# Patient Record
Sex: MALE | Race: WHITE | NOT HISPANIC OR LATINO | Employment: OTHER | URBAN - METROPOLITAN AREA
[De-identification: names, ages, dates, MRNs, and addresses within clinical notes are randomized per-mention and may not be internally consistent; named-entity substitution may affect disease eponyms.]

---

## 2017-03-22 RX ORDER — MEMANTINE HYDROCHLORIDE 5 MG/1
5 TABLET ORAL 2 TIMES DAILY
Qty: 60 TABLET | Refills: 2 | Status: SHIPPED | OUTPATIENT
Start: 2017-03-22 | End: 2017-05-09 | Stop reason: SDUPTHER

## 2017-04-14 RX ORDER — LEVETIRACETAM 500 MG/1
TABLET ORAL
Qty: 90 TABLET | Refills: 0 | Status: SHIPPED | OUTPATIENT
Start: 2017-04-14 | End: 2017-05-09 | Stop reason: SDUPTHER

## 2017-04-26 PROBLEM — K21.9 HIATAL HERNIA WITH GERD: Status: ACTIVE | Noted: 2017-04-26

## 2017-04-26 PROBLEM — H40.9 GLAUCOMA: Status: ACTIVE | Noted: 2017-04-26

## 2017-04-26 PROBLEM — R00.1 BRADYCARDIA: Status: ACTIVE | Noted: 2017-04-26

## 2017-04-26 PROBLEM — N40.0 BPH (BENIGN PROSTATIC HYPERPLASIA): Status: ACTIVE | Noted: 2017-04-26

## 2017-04-26 PROBLEM — K44.9 HIATAL HERNIA WITH GERD: Status: ACTIVE | Noted: 2017-04-26

## 2017-04-26 PROBLEM — G47.33 OSA ON CPAP: Status: ACTIVE | Noted: 2017-04-26

## 2017-04-26 PROBLEM — Z85.828 HISTORY OF SKIN CANCER: Status: ACTIVE | Noted: 2017-04-26

## 2017-04-26 PROBLEM — Z99.89 OSA ON CPAP: Status: ACTIVE | Noted: 2017-04-26

## 2017-05-09 ENCOUNTER — OFFICE VISIT (OUTPATIENT)
Dept: NEUROLOGY | Facility: CLINIC | Age: 82
End: 2017-05-09

## 2017-05-09 VITALS
DIASTOLIC BLOOD PRESSURE: 62 MMHG | SYSTOLIC BLOOD PRESSURE: 140 MMHG | HEIGHT: 67 IN | BODY MASS INDEX: 17.27 KG/M2 | HEART RATE: 59 BPM | WEIGHT: 110 LBS | OXYGEN SATURATION: 98 %

## 2017-05-09 DIAGNOSIS — G30.1 LATE ONSET ALZHEIMER'S DISEASE WITHOUT BEHAVIORAL DISTURBANCE (HCC): Primary | ICD-10-CM

## 2017-05-09 DIAGNOSIS — R27.0 ATAXIA: ICD-10-CM

## 2017-05-09 DIAGNOSIS — F02.80 LATE ONSET ALZHEIMER'S DISEASE WITHOUT BEHAVIORAL DISTURBANCE (HCC): Primary | ICD-10-CM

## 2017-05-09 DIAGNOSIS — G40.909 SEIZURE DISORDER (HCC): ICD-10-CM

## 2017-05-09 DIAGNOSIS — R53.1 WEAKNESS: ICD-10-CM

## 2017-05-09 PROCEDURE — 99213 OFFICE O/P EST LOW 20 MIN: CPT | Performed by: PSYCHIATRY & NEUROLOGY

## 2017-05-09 RX ORDER — MEMANTINE HYDROCHLORIDE 5 MG/1
5 TABLET ORAL 2 TIMES DAILY
Qty: 180 TABLET | Refills: 3 | Status: SHIPPED | OUTPATIENT
Start: 2017-05-09 | End: 2018-06-01 | Stop reason: SDUPTHER

## 2017-05-09 RX ORDER — LEVETIRACETAM 500 MG/1
500 TABLET ORAL 3 TIMES DAILY
Qty: 270 TABLET | Refills: 3 | Status: SHIPPED | OUTPATIENT
Start: 2017-05-09 | End: 2018-06-01 | Stop reason: SDUPTHER

## 2017-05-09 RX ORDER — DORZOLAMIDE HCL 20 MG/ML
1 SOLUTION/ DROPS OPHTHALMIC 2 TIMES DAILY
Refills: 11 | COMMUNITY
Start: 2017-04-10 | End: 2017-08-01

## 2017-06-11 ENCOUNTER — HOSPITAL ENCOUNTER (OUTPATIENT)
Facility: HOSPITAL | Age: 82
Setting detail: OBSERVATION
LOS: 1 days | Discharge: HOME | End: 2017-06-12
Attending: INTERNAL MEDICINE | Admitting: INTERNAL MEDICINE

## 2017-06-11 DIAGNOSIS — Z78.9 IMPAIRED MOBILITY AND ADLS: Primary | ICD-10-CM

## 2017-06-11 DIAGNOSIS — Z74.09 IMPAIRED MOBILITY AND ADLS: Primary | ICD-10-CM

## 2017-06-11 PROBLEM — K21.9 GERD (GASTROESOPHAGEAL REFLUX DISEASE): Status: ACTIVE | Noted: 2017-06-11

## 2017-06-11 PROBLEM — K46.0 INCARCERATED HERNIA: Status: ACTIVE | Noted: 2017-06-11

## 2017-06-11 PROBLEM — K56.609 SMALL BOWEL OBSTRUCTION (HCC): Status: ACTIVE | Noted: 2017-06-11

## 2017-06-11 PROBLEM — E43 SEVERE MALNUTRITION (HCC): Status: ACTIVE | Noted: 2017-06-11

## 2017-06-11 PROBLEM — N18.9 CHRONIC KIDNEY DISEASE: Status: ACTIVE | Noted: 2017-06-11

## 2017-06-11 PROCEDURE — 99220 PR INITIAL OBSERVATION CARE/DAY 70 MINUTES: CPT | Performed by: HOSPITALIST

## 2017-06-11 RX ORDER — DORZOLAMIDE HCL 20 MG/ML
1 SOLUTION/ DROPS OPHTHALMIC 3 TIMES DAILY
Status: DISCONTINUED | OUTPATIENT
Start: 2017-06-11 | End: 2017-06-12 | Stop reason: HOSPADM

## 2017-06-11 RX ORDER — ASPIRIN 81 MG/1
81 TABLET, CHEWABLE ORAL DAILY
Status: DISCONTINUED | OUTPATIENT
Start: 2017-06-12 | End: 2017-06-12 | Stop reason: HOSPADM

## 2017-06-11 RX ORDER — ATORVASTATIN CALCIUM 10 MG/1
10 TABLET, FILM COATED ORAL DAILY
Status: DISCONTINUED | OUTPATIENT
Start: 2017-06-12 | End: 2017-06-12 | Stop reason: HOSPADM

## 2017-06-11 RX ORDER — MEMANTINE HYDROCHLORIDE 10 MG/1
5 TABLET ORAL 2 TIMES DAILY
Status: DISCONTINUED | OUTPATIENT
Start: 2017-06-11 | End: 2017-06-12 | Stop reason: HOSPADM

## 2017-06-11 RX ORDER — LISINOPRIL 20 MG/1
20 TABLET ORAL DAILY
Status: DISCONTINUED | OUTPATIENT
Start: 2017-06-12 | End: 2017-06-12 | Stop reason: HOSPADM

## 2017-06-11 RX ORDER — SODIUM CHLORIDE 0.9 % (FLUSH) 0.9 %
1-10 SYRINGE (ML) INJECTION AS NEEDED
Status: DISCONTINUED | OUTPATIENT
Start: 2017-06-11 | End: 2017-06-12 | Stop reason: HOSPADM

## 2017-06-11 RX ORDER — HYDRALAZINE HYDROCHLORIDE 20 MG/ML
10 INJECTION INTRAMUSCULAR; INTRAVENOUS EVERY 6 HOURS PRN
Status: DISCONTINUED | OUTPATIENT
Start: 2017-06-11 | End: 2017-06-12 | Stop reason: HOSPADM

## 2017-06-11 RX ORDER — SODIUM CHLORIDE 9 MG/ML
50 INJECTION, SOLUTION INTRAVENOUS CONTINUOUS
Status: DISCONTINUED | OUTPATIENT
Start: 2017-06-11 | End: 2017-06-12 | Stop reason: HOSPADM

## 2017-06-11 RX ORDER — ONDANSETRON 4 MG/1
4 TABLET, FILM COATED ORAL EVERY 6 HOURS PRN
Status: DISCONTINUED | OUTPATIENT
Start: 2017-06-11 | End: 2017-06-12 | Stop reason: HOSPADM

## 2017-06-11 RX ORDER — LEVETIRACETAM 500 MG/1
500 TABLET ORAL 3 TIMES DAILY
Status: DISCONTINUED | OUTPATIENT
Start: 2017-06-11 | End: 2017-06-12 | Stop reason: HOSPADM

## 2017-06-11 RX ORDER — ACETAMINOPHEN 325 MG/1
650 TABLET ORAL EVERY 4 HOURS PRN
Status: DISCONTINUED | OUTPATIENT
Start: 2017-06-11 | End: 2017-06-12 | Stop reason: HOSPADM

## 2017-06-11 RX ORDER — ONDANSETRON 2 MG/ML
4 INJECTION INTRAMUSCULAR; INTRAVENOUS EVERY 6 HOURS PRN
Status: DISCONTINUED | OUTPATIENT
Start: 2017-06-11 | End: 2017-06-12 | Stop reason: HOSPADM

## 2017-06-11 RX ADMIN — LEVETIRACETAM 500 MG: 500 TABLET, FILM COATED ORAL at 22:11

## 2017-06-11 RX ADMIN — DORZOLAMIDE HYDROCHLORIDE 1 DROP: 20 SOLUTION/ DROPS OPHTHALMIC at 22:12

## 2017-06-11 RX ADMIN — SODIUM CHLORIDE 50 ML/HR: 9 INJECTION, SOLUTION INTRAVENOUS at 21:14

## 2017-06-11 RX ADMIN — APIXABAN 2.5 MG: 2.5 TABLET, FILM COATED ORAL at 22:12

## 2017-06-11 RX ADMIN — MEMANTINE 5 MG: 10 TABLET ORAL at 22:12

## 2017-06-11 NOTE — H&P
[facility] Medicine Services  HISTORY AND PHYSICAL    Primary Care Physician: Blake Gomez MD    Subjective     Chief Complaint: Abdominal Pain    History of Present Illness:   This is an 89 year old male with a past medical history significant for Paroxysmal atrial fibrillation, hypertension, seizure disorder, obstructive sleep apnea, Alzheimer's Dementia and coronary artery disease who presents as a transfer from [facility] after being evaluated for lower abdominal pain. History of present illness limited secondary to baseline dementia. Wife at bedside offers input. She states patient has had the pain intermittently over the course of past week. No radiation. No modifying factors. There is however, associated nausea with non-bloody vomiting and diarrhea. No complaints of fever, dysuria, cough, congestion, or sick contacts. Per outlying facility, chemistry and hematology unremarkable and urinalysis negative. CT abdomen impressive for Left incarcerated inguinal hernia. Patient subsequently transferred to [facility] for further treatment and evaluation.    THIS IS AN 88 YO M WITH MULTIPLE MEDICAL PROBLEMS, INCLUDING ADVANCED DEMENTIA WITH BEHAVIORAL PROBLEMS, SEIZURE D/O-LAST SEIZED ABOUT 22 MONTHS AGO AFTER LAMINECTOMY THAT WAS COMPLICATED BY ACUTE HYPOXIC RESPIRATORY FAILURE DUE TO ASPIRATION, WHICH REQUIRED INTUBATION TWICE AND PT MISSED SEIZURE MEDS AT THAT TIME, CHRONIC GAIT INSTABILITY, CAD/CABG, PAF ON ELIQUIS, AND HTN/HLD, WHO WAS TRANSFERRED FROM [facility] DUE TO INCARCERATED L INGUINAL HERNIA, REPORTEDLY WITH BOWEL OBSTRUCTION, BUT WHEN PT GOT HERE, HE WAS HAVING BOWEL MOVEMENTS AND NO FEVER/CHILLS OR LEUKOCYTOSIS. HIS WIFE REPORTED THAT HE HAD BEEN HAVING INTERMITTENT PAIN OVER THE LAST 5 DAYS, BUT WORSE TODAY WITH NAUSEA AND VOMITING. SHE ALSO REPORTED THAT HE'S HAD INCARCERATED HERNIA IN THE EARLY 1990S AND HAD SURGERY IN [place], [place], BUT WIFE DOES NOT REMEMBER  WHICH SIDE AND PT UNABLE TO PROVIDE HISTORY EITHER DUE TO DEMENTIA.    Review of Systems   Constitutional: Negative for fever and unexpected weight change.   HENT: Negative for congestion and sore throat.    Eyes: Negative for photophobia and visual disturbance.   Respiratory: Negative for cough and shortness of breath.    Cardiovascular: Negative for chest pain and leg swelling.   Gastrointestinal: Positive for abdominal pain, diarrhea, nausea and vomiting. Negative for blood in stool and constipation.   Endocrine: Negative for polyphagia and polyuria.   Genitourinary: Negative for dysuria and flank pain.   Musculoskeletal: Negative for arthralgias and back pain.   Skin: Positive for wound. Negative for pallor and rash.   Allergic/Immunologic: Negative for immunocompromised state.   Neurological: Negative for dizziness, syncope and headaches.   Hematological: Negative for adenopathy.   Psychiatric/Behavioral: Negative for agitation and confusion.      Otherwise complete ROS performed and negative except as mentioned in the HPI.    Past Medical History:   Diagnosis Date   • Abnormal CT scan, lung     PULMONARY NODULE (<6CM)   • Atrial fibrillation    • BPH (benign prostatic hyperplasia) 4/26/2017   • BPH (benign prostatic hypertrophy)    • Bradycardia 4/26/2017   • Cardiac disorder    • Cataract     STATUS POST REMOVAL WITH LENS IMPLANT   • Coronary artery disease    • Esophageal stricture     WTIH NOTED DILATION   • GERD (gastroesophageal reflux disease)    • Glaucoma    • Glaucoma    • H/O inguinal hernia repair    • Hiatal hernia    • Hiatal hernia with GERD 4/26/2017   • Hypercholesteremia    • Hypertension    • Malignant neoplasm    • Obstructive sleep apnea on CPAP    • LEE on CPAP 4/26/2017   • Paroxysmal atrial fibrillation    • Peripheral vascular disease    • Right upper lobe pneumonia    • Seizure disorder    • Skin cancer     WITH REMOVAL   • TIA (transient ischemic attack) 02/2013    Carotid duplex 50%  right ICA.       Past Surgical History:   Procedure Laterality Date   • BACK SURGERY     • CATARACT EXTRACTION WITH INTRAOCULAR LENS IMPLANT     • CERVICAL LAMINECTOMY     • CORONARY ARTERY BYPASS GRAFT  04/15/2002    x4   • ESOPHAGEAL DILATATION     • HERNIA REPAIR     • LUMBAR LAMINECTOMY  02/2012   • SKIN CANCER EXCISION         Family History   Problem Relation Age of Onset   • Coronary artery disease Other    • Stroke Other        Social History     Social History   • Marital status:      Spouse name: N/A   • Number of children: N/A   • Years of education: N/A     Occupational History   • Not on file.     Social History Main Topics   • Smoking status: Former Smoker   • Smokeless tobacco: Not on file      Comment: QUIT MANY YEARS AGO   • Alcohol use No   • Drug use: No   • Sexual activity: Defer     Other Topics Concern   • Not on file     Social History Narrative       Medications:  Prescriptions Prior to Admission   Medication Sig Dispense Refill Last Dose   • acetaminophen-codeine (TYLENOL #3) 300-30 MG per tablet Take  by mouth.   Taking   • apixaban (ELIQUIS) 2.5 MG tablet tablet Take 1 tablet by mouth 2 (two) times a day. (Patient taking differently: Take 10 mg by mouth 2 (Two) Times a Day.) 60 tablet 11 Taking   • aspirin (ASPIRIN LOW DOSE) 81 MG tablet Take  by mouth.   Taking   • Brinzolamide-Brimonidine (SIMBRINZA) 1-0.2 % suspension Apply  to eye.   Not Taking   • dorzolamide (TRUSOPT) 2 % ophthalmic solution   11 Taking   • EPINEPHrine (EPIPEN 2-ANKITA) 0.3 MG/0.3ML solution auto-injector injection EpiPen 2-Ankita 0.3 MG/0.3ML Injection Solution Auto-injector; Patient Sig: EpiPen 2-Ankita 0.3 MG/0.3ML Injection Solution Auto-injector ; 2; 0; 29-Apr-2014; Active   Taking   • levETIRAcetam (KEPPRA) 500 MG tablet Take 1 tablet by mouth 3 (Three) Times a Day. 270 tablet 3    • lisinopril (PRINIVIL,ZESTRIL) 10 MG tablet Take 20 mg by mouth Daily.   Taking   • memantine (NAMENDA) 5 MG tablet Take 1 tablet by  "mouth 2 (Two) Times a Day. 180 tablet 3    • pravastatin (PRAVACHOL) 40 MG tablet Take 40 mg by mouth daily.   Taking   • thiamine (VITAMINE B-1) 100 MG tablet Take  by mouth.   Taking   • timolol (TIMOPTIC) 0.25 % ophthalmic solution Apply  to eye.   Taking   • travoprost, [place] free, (TRAVATAN Z) 0.004 % solution ophthalmic solution Apply  to eye.   Taking       Allergies:  Allergies   Allergen Reactions   • Bee Venom    • Lipitor [Atorvastatin] Myalgia         Objective     Physical Exam:  Vital Signs: /73 (BP Location: Left arm, Patient Position: Lying)  Pulse 63  Temp 98.3 °F (36.8 °C) (Oral)   Resp 16  Ht 65\" (165.1 cm)  Wt 101 lb 12.8 oz (46.2 kg)  SpO2 97%  BMI 16.94 kg/m2  Physical Exam  Temp:  [98.3 °F (36.8 °C)] 98.3 °F (36.8 °C)  Heart Rate:  [63] 63  Resp:  [16] 16  BP: (177)/(73) 177/73  Constitutional: no acute distress, awake, alert, Dry MM, cachectic  Eyes: PERRLA, sclerae anicteric, no conjunctival injection  Neck: supple, no thyromegaly, trachea midline  Respiratory: Clear to auscultation bilaterally, nonlabored respirations   Cardiovascular: RRR, no murmurs, rubs, or gallops, palpable pedal pulses bilaterally  Gastrointestinal:decreased bowel sounds, soft, nontender, nondistended  Musculoskeletal: No bilateral ankle edema, no clubbing or cyanosis to bilateral lower extremities  Psychiatric: oriented x 3, appropriate affect, cooperative  Neurologic: Strength symmetric in all extremities, Cranial Nerves grossly intact to confrontation       ABD: SOFT, NT, ND, + BS, NO GUARDING OR PERITONEAL SIGNS, NO HERNIAS NOTED BILAT (REDUCED BY DR. LOYOLA EARLIER). NO SCARS NOTED ON EXAM.  SKIN: ABRASION ON SCALP/RIGHT SHOULDER (FELL WHILE WALKING DOG)    Results Reviewed:        OUTSIDE RECORDS REVIEWED    I have personally reviewed and interpreted available lab data, radiology studies and ECG obtained at time of admission.     Assessment / Plan     Assessment/Problem List:   Hospital Problem List  "    * (Principal)Incarcerated hernia    Chronic atrial fibrillation    Overview Signed 4/26/2017  9:21 AM by Kirk reynaga Initial occurrence postoperative CABG.  b. Recurrence, 2012 in setting of pneumonia.   c. CHADS II score of 2.           Essential hypertension    Seizure disorder    Overview Signed 4/26/2017  9:22 AM by Kirk schaeffer. Recurrent symptoms from May 2014 to May 2015.             Chronic kidney disease    CAD (coronary artery disease)    Overview Signed 7/20/2016  4:04 PM by Chris Saunders MD     a. March 2002, abnormal GXT cardiac catheterization, Dr. Hancock, 90% RCA, 70% proximal circumflex, 80% first obtuse marginal, and a 95% LAD with a normal LVEF.  b. On 04/15/02, coronary artery bypass grafting by Dr. Baker x4 with saphenous vein graft to the OM/lateral circumflex, saphenous vein graft to the PDA, and LIMA to the LAD.  i. Noted postoperative atrial fibrillation.   c. February 2004, Cardiolite negative for ischemia, EF of 64%.  d. July 2007, stress test negative for ischemia, read by Dr. Powell.          Hyperlipemia    Late onset Alzheimer's disease without behavioral disturbance    BPH (benign prostatic hyperplasia)    LEE on CPAP    GERD (gastroesophageal reflux disease)    Severe malnutrition            Plan:  1. Incarcerated Inguinal Hernia:  - Demonstrated on CT per outlying facility. No infectious or necrotic process appreciated. Vitals stable.  - remote history of hernia repair, unknown location  - Inpatient consult to general surgery. Unlikely surgical intervention. Possible manual reduction  - AM labs  - NPO except medications for now until cleared by surgery  - gentle hydration with saline 50 cc/hr    2. Chronic A. Fib:  - Stable and rate controlled.  - Chronically anticoagulated on Eliquis. Continue for now.    3. Hypertension:  - stable and controlled. Continue home meds. Monitor. PRN antihypertensive    4.Seizure Disorder  - On Keppra. Continue  - initiate  seizure precautions  - ativan PRN    5. LEE :  - no longer uses CPAP  - will have PRN    6. Severe malnutrition. Failure to thrive.  - Recent mechanical fall at home.  - PT/OT  - Nutrition Eval    Patient stable for admission to monitored med surg. Labs and vitals routine per nursing.    SPOKE WITH DR. KIP LOYOLA, WHO NOTED A SMALL L INGUINAL HERNIA AND REDUCED IT. NO SIGNS OF ANY BOWEL OBSTRUCTION. PT IS HEMODYNAMICALLY STABLE. NO PAIN NOW. PT/WIFE DOES NOT WANT ANY SURGERY IF NOT NECESSARY. WILL RESUME HOME MEDS-INCLUDING ELEIQUIS, DIET, AND PROVIDE VERY GENTLE IVF; HOME TOMORROW.    DVT prophylaxis: SCDs/TCD-ELIQUIS RESUMED  Code Status: full code  Admission Status: Patient will be admitted to OBSERVATION status, however if further evaluation or treatment plans warrant, status may change.  Based upon current information, I predict patient's care encounter to be less than or equal to 2 midnights.       Ann Avila PA-C 06/11/17 8:47 PM

## 2017-06-11 NOTE — CONSULTS
Paul Lyons  [id number]  12/24/1927       Patient Care Team:  Blake Gomez MD as PCP - General   Consulting: Mini      General Surgery History and Physical / Consult Note      Chief complaint abd pain  Subjective     Patient is a 89 y.o. male presents with 5 day history of crampy abd pain.  Today nausea and vomiting x 2 and wife took to [facility].  CT revealed SBO secondary to incarcerated LIH.  Dr. Mann and I accepted pt in transfer.  Pt has had an inguinal hernia repair many years ago but doesn't remember what side.  He is on Eliquis for afib.  He has dementia and does not remember when his last BM was.    Review of Systems   Pertinent items are noted in HPI    History  Past Medical History:   Diagnosis Date   • Abnormal CT scan, lung     PULMONARY NODULE (<6CM)   • Atrial fibrillation    • BPH (benign prostatic hyperplasia) 4/26/2017   • BPH (benign prostatic hypertrophy)    • Bradycardia 4/26/2017   • Cardiac disorder    • Cataract     STATUS POST REMOVAL WITH LENS IMPLANT   • Coronary artery disease    • Esophageal stricture     WTIH NOTED DILATION   • GERD (gastroesophageal reflux disease)    • Glaucoma    • Glaucoma    • H/O inguinal hernia repair    • Hiatal hernia    • Hiatal hernia with GERD 4/26/2017   • Hypercholesteremia    • Hypertension    • Malignant neoplasm    • Obstructive sleep apnea on CPAP    • LEE on CPAP 4/26/2017   • Paroxysmal atrial fibrillation    • Peripheral vascular disease    • Right upper lobe pneumonia    • Seizure disorder    • Skin cancer     WITH REMOVAL   • TIA (transient ischemic attack) 02/2013    Carotid duplex 50% right ICA.     Past Surgical History:   Procedure Laterality Date   • BACK SURGERY     • CATARACT EXTRACTION WITH INTRAOCULAR LENS IMPLANT     • CERVICAL LAMINECTOMY     • CORONARY ARTERY BYPASS GRAFT  04/15/2002    x4   • ESOPHAGEAL DILATATION     • HERNIA REPAIR     • LUMBAR LAMINECTOMY  02/2012   • SKIN CANCER EXCISION       Family History   Problem  "Relation Age of Onset   • Coronary artery disease Other    • Stroke Other      Social History   Substance Use Topics   • Smoking status: Former Smoker   • Smokeless tobacco: None      Comment: QUIT MANY YEARS AGO   • Alcohol use No     Prescriptions Prior to Admission   Medication Sig Dispense Refill Last Dose   • acetaminophen-codeine (TYLENOL #3) 300-30 MG per tablet Take  by mouth.   Taking   • apixaban (ELIQUIS) 2.5 MG tablet tablet Take 1 tablet by mouth 2 (two) times a day. (Patient taking differently: Take 10 mg by mouth 2 (Two) Times a Day.) 60 tablet 11 Taking   • aspirin (ASPIRIN LOW DOSE) 81 MG tablet Take  by mouth.   Taking   • Brinzolamide-Brimonidine (SIMBRINZA) 1-0.2 % suspension Apply  to eye.   Not Taking   • dorzolamide (TRUSOPT) 2 % ophthalmic solution   11 Taking   • EPINEPHrine (EPIPEN 2-ANKITA) 0.3 MG/0.3ML solution auto-injector injection EpiPen 2-Ankita 0.3 MG/0.3ML Injection Solution Auto-injector; Patient Sig: EpiPen 2-Ankita 0.3 MG/0.3ML Injection Solution Auto-injector ; 2; 0; 29-Apr-2014; Active   Taking   • levETIRAcetam (KEPPRA) 500 MG tablet Take 1 tablet by mouth 3 (Three) Times a Day. 270 tablet 3    • lisinopril (PRINIVIL,ZESTRIL) 10 MG tablet Take 20 mg by mouth Daily.   Taking   • memantine (NAMENDA) 5 MG tablet Take 1 tablet by mouth 2 (Two) Times a Day. 180 tablet 3    • pravastatin (PRAVACHOL) 40 MG tablet Take 40 mg by mouth daily.   Taking   • thiamine (VITAMINE B-1) 100 MG tablet Take  by mouth.   Taking   • timolol (TIMOPTIC) 0.25 % ophthalmic solution Apply  to eye.   Taking   • travoprost, [place] free, (TRAVATAN Z) 0.004 % solution ophthalmic solution Apply  to eye.   Taking     Allergies:  Bee venom and Lipitor [atorvastatin]    Objective     Vital Signs  Blood pressure 177/73, pulse 63, temperature 98.3 °F (36.8 °C), temperature source Oral, resp. rate 16, height 65\" (165.1 cm), weight 101 lb 12.8 oz (46.2 kg), SpO2 97 %.      Physical Exam:      General Appearance:    Alert, " cooperative, in no acute distress   Head:    Normocephalic, without obvious abnormality, atraumatic   Eyes:            Lids and lashes normal, conjunctivae and sclerae normal, no   icterus, no pallor, corneas clear, PERRLA   Ears:    Ears appear intact with no abnormalities noted   Throat:   No oral lesions, no thrush, oral mucosa moist   Neck:   No adenopathy, supple, trachea midline, no thyromegaly, no   carotid bruit, no JVD   Back:     No kyphosis present, no scoliosis present, no skin lesions,      erythema or scars, no tenderness to percussion or                   palpation,   range of motion normal   Lungs:     Clear to auscultation,respirations regular, even and                  unlabored    Heart:    Regular rhythm and normal rate, normal S1 and S2, no            murmur, no gallop, no rub, no click   Chest Wall:    No abnormalities observed   Abdomen:     Normal bowel sounds, no masses, no organomegaly, soft        non-tender, non-distended, no guarding, no rebound                Tenderness. I cannot find a scar in either inguinal region.   Left inguinal bulge nontender and no skin changes, easily reduced with gentle pressure.   Rectal:     Deferred   Extremities:   Moves all extremities well, no edema, no cyanosis, no             redness   Pulses:   Pulses palpable and equal bilaterally   Skin:   No bleeding, bruising or rash   Lymph nodes:   No palpable adenopathy   Neurologic:   Cranial nerves 2 - 12 grossly intact, sensation intact, DTR       present and equal bilaterally       Results Review:   OSH labs: WBC 9.3, creatinine 1        IMAGING:  OSH CT scan reviewed: loop of small bowel within left inguinal hernia and pt of SBO, no signs of ischemia    Assessment/Plan    1. LIH that was incarcerated but not strangulated and is now completely reduced.  Pt does not want surgery.  If hernia remains reduced overnight he may have PO tomorrow and if tolerates he may be discharged.  2. CAD  3. Afib on Eliquis:  hold for now.    Active Problems:    CAD (coronary artery disease)    Chronic atrial fibrillation    Essential hypertension    Hyperlipemia    Seizure disorder    Late onset Alzheimer's disease without behavioral disturbance    BPH (benign prostatic hyperplasia)    LEE on CPAP    GERD (gastroesophageal reflux disease)        I discussed the patients findings and my recommendations with patient and family.     Sarah Fontaine MD  06/11/17  7:22 PM

## 2017-06-12 VITALS
HEART RATE: 53 BPM | SYSTOLIC BLOOD PRESSURE: 138 MMHG | HEIGHT: 65 IN | DIASTOLIC BLOOD PRESSURE: 63 MMHG | WEIGHT: 101.8 LBS | OXYGEN SATURATION: 97 % | RESPIRATION RATE: 18 BRPM | BODY MASS INDEX: 16.96 KG/M2 | TEMPERATURE: 98 F

## 2017-06-12 LAB
ANION GAP SERPL CALCULATED.3IONS-SCNC: 7 MMOL/L (ref 3–11)
BUN BLD-MCNC: 13 MG/DL (ref 9–23)
BUN/CREAT SERPL: 14.4 (ref 7–25)
CALCIUM SPEC-SCNC: 9.6 MG/DL (ref 8.7–10.4)
CHLORIDE SERPL-SCNC: 103 MMOL/L (ref 99–109)
CO2 SERPL-SCNC: 29 MMOL/L (ref 20–31)
CREAT BLD-MCNC: 0.9 MG/DL (ref 0.6–1.3)
DEPRECATED RDW RBC AUTO: 46.3 FL (ref 37–54)
ERYTHROCYTE [DISTWIDTH] IN BLOOD BY AUTOMATED COUNT: 15 % (ref 11.3–14.5)
GFR SERPL CREATININE-BSD FRML MDRD: 79 ML/MIN/1.73
GLUCOSE BLD-MCNC: 99 MG/DL (ref 70–100)
HCT VFR BLD AUTO: 41.9 % (ref 38.9–50.9)
HGB BLD-MCNC: 13.4 G/DL (ref 13.1–17.5)
MCH RBC QN AUTO: 27.2 PG (ref 27–31)
MCHC RBC AUTO-ENTMCNC: 32 G/DL (ref 32–36)
MCV RBC AUTO: 85 FL (ref 80–99)
PLATELET # BLD AUTO: 245 10*3/MM3 (ref 150–450)
PMV BLD AUTO: 9.7 FL (ref 6–12)
POTASSIUM BLD-SCNC: 3.8 MMOL/L (ref 3.5–5.5)
RBC # BLD AUTO: 4.93 10*6/MM3 (ref 4.2–5.76)
SODIUM BLD-SCNC: 139 MMOL/L (ref 132–146)
WBC NRBC COR # BLD: 6.61 10*3/MM3 (ref 3.5–10.8)

## 2017-06-12 PROCEDURE — 99217 PR OBSERVATION CARE DISCHARGE MANAGEMENT: CPT | Performed by: INTERNAL MEDICINE

## 2017-06-12 RX ADMIN — MEMANTINE 5 MG: 10 TABLET ORAL at 08:31

## 2017-06-12 RX ADMIN — ATORVASTATIN CALCIUM 10 MG: 10 TABLET, FILM COATED ORAL at 08:28

## 2017-06-12 RX ADMIN — ASPIRIN 81 MG 81 MG: 81 TABLET ORAL at 08:28

## 2017-06-12 RX ADMIN — APIXABAN 2.5 MG: 2.5 TABLET, FILM COATED ORAL at 08:28

## 2017-06-12 RX ADMIN — LEVETIRACETAM 500 MG: 500 TABLET, FILM COATED ORAL at 08:28

## 2017-06-12 RX ADMIN — DORZOLAMIDE HYDROCHLORIDE 1 DROP: 20 SOLUTION/ DROPS OPHTHALMIC at 08:29

## 2017-06-12 RX ADMIN — LISINOPRIL 20 MG: 20 TABLET ORAL at 08:28

## 2017-06-12 NOTE — PROGRESS NOTES
Discharge Planning Assessment  [facility]     Patient Name: Paul Lyons  MRN: [id number]  Today's Date: 6/12/2017    Admit Date: 6/11/2017          Discharge Needs Assessment       06/12/17 1106    Living Environment    Lives With spouse    Living Arrangements house    Home Accessibility bed and bath are not on the first floor;bed and bath on same level    Stair Railings at Home inside, present at both sides    Type of Financial/Environmental Concern none    Transportation Available car    Living Environment Comment CM spoke with pt and pt spouse in room with permission in regards to discharge planning. Pt resides in [facility] with spouse in a 2 story home. Spouse states they dont use the upstairs. Pt is independent of ADLs. prior to admission.     Living Environment    Provides Primary Care For no one, unable/limited ability to care for self    Quality Of Family Relationships supportive    Able to Return to Prior Living Arrangements yes    Living Arrangement Comments Pt spouse, that pt has dementia, but is independent of ADLs and goal is to return home.    Discharge Needs Assessment    Concerns To Be Addressed no discharge needs identified    Readmission Within The Last 30 Days no previous admission in last 30 days    Outpatient/Agency/Support Group Needs homecare agency (specify level of care);inpatient rehabilitation facility (specify)   Pt has used [facility] past. Pt went to Rehab in [facility] a couple of years ago.    Anticipated Changes Related to Illness other (see comments)   Pt/spouse denies discharge needs.     Equipment Currently Used at Home none    Equipment Needed After Discharge none    Discharge Disposition still a patient    Discharge Contact Information if Applicable Peggy Lyons(spouse): [phone]    Discharge Planning Comments Pt has Medicare and [place] [facility]  and denies recent changes in insurance.  Pt goal is to return home with spouse. Pt/spouse  denies discharge needs. Spouse with help him and provide transportation for him upon discharge. CM will cont to follow.            Discharge Plan       06/12/17 1118    Case Management/Social Work Plan    Plan discharge plan    Patient/Family In Agreement With Plan yes    Additional Comments Goal is home with spouse. Denies discharge needs. CM will cont to follow        Discharge Placement     No information found                Demographic Summary       06/12/17 1103    Primary Care Physician Information    Name Blake Gomez            Functional Status       06/12/17 1103    Functional Status Prior    Ambulation 0-->independent    Transferring 0-->independent    Toileting 0-->independent    Bathing 0-->independent    Dressing 0-->independent    Eating 0-->independent    Communication 2-->difficulty understanding (not related to language barrier)    Swallowing 0-->swallows foods/liquids without difficulty            Psychosocial     None            Abuse/Neglect     None            Legal     None            Substance Abuse     None            Patient Forms     None          Veronica Vo, RN

## 2017-06-12 NOTE — THERAPY EVALUATION
Acute Care - Occupational Therapy Initial Evaluation  [facility]     Patient Name: Paul Lyons  : 1927  MRN: [id number]  Today's Date: 2017  Onset of Illness/Injury or Date of Surgery Date: 17  Date of Referral to OT: 17  Referring Physician: JORDIN Mcdermott    Admit Date: 2017       ICD-10-CM ICD-9-CM   1. Impaired mobility and ADLs Z74.09 799.89     Patient Active Problem List   Diagnosis   • Lung nodule   • CAD (coronary artery disease)   • Chronic atrial fibrillation   • Essential hypertension   • Hyperlipemia   • Shoulder bursitis   • Seizure disorder   • Late onset Alzheimer's disease without behavioral disturbance   • History of skin cancer   • BPH (benign prostatic hyperplasia)   • Glaucoma   • Hiatal hernia with GERD   • LEE on CPAP   • Bradycardia   • Ataxia   • Weakness   • GERD (gastroesophageal reflux disease)   • Incarcerated hernia   • Chronic kidney disease   • Severe malnutrition     Past Medical History:   Diagnosis Date   • Abnormal CT scan, lung     PULMONARY NODULE (<6CM)   • Atrial fibrillation    • BPH (benign prostatic hyperplasia) 2017   • BPH (benign prostatic hypertrophy)    • Bradycardia 2017   • Cardiac disorder    • Cataract     STATUS POST REMOVAL WITH LENS IMPLANT   • Coronary artery disease    • Esophageal stricture     WTIH NOTED DILATION   • GERD (gastroesophageal reflux disease)    • Glaucoma    • Glaucoma    • H/O inguinal hernia repair    • Hiatal hernia    • Hiatal hernia with GERD 2017   • Hypercholesteremia    • Hypertension    • Malignant neoplasm    • Obstructive sleep apnea on CPAP    • LEE on CPAP 2017   • Paroxysmal atrial fibrillation    • Peripheral vascular disease    • Right upper lobe pneumonia    • Seizure disorder    • Skin cancer     WITH REMOVAL   • TIA (transient ischemic attack) 2013    Carotid duplex 50% right ICA.     Past Surgical History:   Procedure Laterality Date   • BACK SURGERY     • CATARACT  EXTRACTION WITH INTRAOCULAR LENS IMPLANT     • CERVICAL LAMINECTOMY     • CORONARY ARTERY BYPASS GRAFT  04/15/2002    x4   • ESOPHAGEAL DILATATION     • HERNIA REPAIR     • LUMBAR LAMINECTOMY  02/2012   • SKIN CANCER EXCISION            OT ASSESSMENT FLOWSHEET (last 72 hours)      OT Evaluation       06/12/17 1106 06/12/17 1103 06/12/17 1100 06/11/17 1900       Rehab Evaluation    Document Type   evaluation;discharge evaluation/summary  -ST      Subjective Information   no complaints;agree to therapy  -ST      Patient Effort, Rehab Treatment   good  -ST      Symptoms Noted During/After Treatment   none  -ST      General Information    Patient Profile Review   yes  -ST      Onset of Illness/Injury or Date of Surgery Date   06/11/17  -ST      Referring Physician   SOILA Mcdermott-PAT  -ST      General Observations   Pt semi-supine in bed, leaning back on elbows; exit alarm and IV intact; wife present  -ST      Pertinent History Of Current Problem   nause X5 days with cramping in abd; found in have hernia  -ST      Precautions/Limitations   fall precautions;other (see comments)   dementia, exit alarm  -ST      Prior Level of Function   independent:;all household mobility;community mobility;transfer;bed mobility;ADL's;home management;dependent:;driving   walks 1-2 miles/day; wife drives   -ST      Equipment Currently Used at Home none  -CS  grab bar  -ST none  -CE     Plans/Goals Discussed With   patient and family;agreed upon  -ST      Risks Reviewed   patient and family:;LOB;nausea/vomiting;dizziness;increased discomfort;change in vital signs  -ST      Benefits Reviewed   patient and family:;improve function;increase independence;increase strength;increase balance;decrease pain;increase knowledge  -ST      Barriers to Rehab   cognitive status  -ST      Living Environment    Lives With spouse  -CS  spouse  -ST spouse  -CE     Living Arrangements house  -CS  house  -ST house  -CE     Home Accessibility bed and bath are not on  the first floor;bed and bath on same level  -CS  house is not wheelchair accessible;tub/shower is not walk in  -ST bed and bath are not on the first floor  -CE     Stair Railings at Home inside, present at both sides  -CS  inside, present on left side  -ST inside, present on left side  -CE     Type of Financial/Environmental Concern none  -CS  none  -ST none  -CE     Transportation Available car  -CS   car;family or friend will provide  -CE     Living Environment Comment CM spoke with pt and pt spouse in room with permission in regards to discharge planning. Pt resides in [facility] with spouse in a 2 story home. Spouse states they dont use the upstairs. Pt is independent of ADLs. prior to admission.   -CS  wife available 24/7 as needed; pt with dementia and requires reminders and cuing for corrections on accurate information; pt walks 1-2 miles per day/gardens however wife states decline overall in balance and activity level over past yr  -ST      Clinical Impression    Date of Referral to OT   06/12/17  -ST      OT Diagnosis   impaired ADLs  -ST      Prognosis   good  -ST      Patient/Family Goals Statement   return home  -ST      Criteria for Skilled Therapeutic Interventions Met   yes  -ST      Rehab Potential   good, to achieve stated therapy goals  -ST      Therapy Frequency   daily   pending d/c on day of eval   -ST      Anticipated Equipment Needs At Discharge   tub bench  -ST      Anticipated Discharge Disposition   home with assist;home with outpatient services   OP PT  -ST      Functional Level Prior    Ambulation  0-->independent  -CS  0-->independent  -CE     Transferring  0-->independent  -CS  0-->independent  -CE     Toileting  0-->independent  -CS  0-->independent  -CE     Bathing  0-->independent  -CS  0-->independent  -CE     Dressing  0-->independent  -CS  0-->independent  -CE     Eating  0-->independent  -CS  0-->independent  -CE     Communication  2-->difficulty understanding (not related to  language barrier)  -CS  2-->difficulty understanding (not related to language barrier)  -CE     Swallowing  0-->swallows foods/liquids without difficulty  -CS  0-->swallows foods/liquids without difficulty  -CE     Pain Assessment    Pain Assessment   No/denies pain  -ST      Cognitive Assessment/Intervention    Current Cognitive/Communication Assessment   functional  -ST      Orientation Status   oriented to;person;place   baseline cog per wife d/t dementia  -ST      Follows Commands/Answers Questions   100% of the time  -ST      Personal Safety   mild impairment  -ST      Personal Safety Interventions   fall prevention program maintained;gait belt;nonskid shoes/slippers when out of bed  -ST      Short/Long Term Memory   --   impaired however baseline w/dementia per wife  -ST      ROM (Range of Motion)    General ROM Detail   shoulder flexion and abd impacted actively at baseline, R worse than L; L WFL passively; R with deficits passively (~120 flexion--- baseline) per wife  -ST      MMT (Manual Muscle Testing)    General MMT Assessment Detail   4-5 functionally   -ST      Bed Mobility, Assessment/Treatment    Bed Mobility, Assistive Device   head of bed elevated  -ST      Bed Mob, Supine to Sit, [place]   independent  -ST      Bed Mob, Sit to Supine, [place]   independent  -ST      Transfer Assessment/Treatment    Transfers, Sit-Stand [place]   supervision required  -ST      Transfers, Stand-Sit [place]   supervision required  -ST      Functional Mobility    Functional Mobility- Ind. Level   contact guard assist  -ST      Functional Mobility-Distance (Feet)   500  -ST      Functional Mobility- Comment   no instances LOB for dizziness despite gait disturbance; has significant lateral movement d/t ankle deformity   -ST      Lower Body Dressing Assessment/Training    LB Dressing Assess/Train, Clothing Type   donning:;shoes  -ST      LB Dressing Assess/Train, Position   sitting  -ST      LB  Dressing Assess/Train, [place]   independent  -ST      LB Dressing Assess/Train, Comment   increased time to complete   -ST      Motor Skills/Interventions    Additional Documentation   Fine Motor Coordination Training (Group)  -ST      Fine Motor Coordination Training    Opposition   Right:;Left:;intact  -ST      Sensory Assessment/Intervention    Sensory Impairment   numbness;tingling   R/L hands-onset prior to cervical sx   -ST      General Therapy Interventions    Planned Therapy Interventions   ADL retraining;IADL retraining;balance training;home exercise program;strengthening;stretching   if pt remains in IP  -ST      Positioning and Restraints    Pre-Treatment Position   in bed  -ST      Post Treatment Position   bed  -ST      In Bed   notified nsg;supine;call light within reach;encouraged to call for assist;exit alarm on;patient within staff view;with family/caregiver  -ST        User Key  (r) = Recorded By, (t) = Taken By, (c) = Cosigned By    Initials Name Effective Dates     Alisa YAN Booth, OTR 02/20/17 -     CS Veronica Vo, RN 03/14/16 -     CE Nereida Ferrer RN 10/17/16 -            Occupational Therapy Education     Title: PT OT SLP Therapies (Done)     Topic: Occupational Therapy (Done)     Point: ADL training (Done)    Description: Instruct learner(s) on proper safety adaptation and remediation techniques during self care or transfers.   Instruct in proper use of assistive devices.    Learning Progress Summary    Learner Readiness Method Response Comment Documented by Status   Patient Acceptance MARLENA PROCTOR D VU, DU education on OT; recommend f/u with OP PT upon d/c; safety with balance ST 06/12/17 1154 Done   Family Acceptance MARLENA PROCTOR D VU, DU education on OT; recommend f/u with OP PT upon d/c; safety with balance ST 06/12/17 1154 Done               Point: Home exercise program (Done)    Description: Instruct learner(s) on appropriate technique for monitoring, assisting and/or progressing  therapeutic exercises/activities.    Learning Progress Summary    Learner Readiness Method Response Comment Documented by Status   Patient Acceptance TB,E,D VU,DU education on OT; recommend f/u with OP PT upon d/c; safety with balance ST 06/12/17 1154 Done   Family Acceptance TB,E,D VU,DU education on OT; recommend f/u with OP PT upon d/c; safety with balance ST 06/12/17 1154 Done               Point: Precautions (Done)    Description: Instruct learner(s) on prescribed precautions during self-care and functional transfers.    Learning Progress Summary    Learner Readiness Method Response Comment Documented by Status   Patient Acceptance TB,E,D VU,DU education on OT; recommend f/u with OP PT upon d/c; safety with balance ST 06/12/17 1154 Done   Family Acceptance TB,E,D VU,DU education on OT; recommend f/u with OP PT upon d/c; safety with balance ST 06/12/17 1154 Done               Point: Body mechanics (Done)    Description: Instruct learner(s) on proper positioning and spine alignment during self-care, functional mobility activities and/or exercises.    Learning Progress Summary    Learner Readiness Method Response Comment Documented by Status   Patient Acceptance TB,E,D VU,DU education on OT; recommend f/u with OP PT upon d/c; safety with balance ST 06/12/17 1154 Done   Family Acceptance ESTHELA,E,D VU,DU education on OT; recommend f/u with OP PT upon d/c; safety with balance ST 06/12/17 1154 Done                      User Key     Initials Effective Dates Name Provider Type Discipline    ST 02/20/17 -  MATT Graf Occupational Therapist OT                  OT Recommendation and Plan  Anticipated Equipment Needs At Discharge: tub bench  Anticipated Discharge Disposition: home with assist, home with outpatient services (OP PT)  Planned Therapy Interventions: ADL retraining, IADL retraining, balance training, home exercise program, strengthening, stretching (if pt remains in IP)  Therapy Frequency: daily (pending  d/c on day of eval )  Plan of Care Review  Plan Of Care Reviewed With: patient  Outcome Summary/Follow up Plan: Pending d/c home with family assist; recommend OP PT for high level balance training/strengthening. Pt appears to be at baseline w/cognition; however mild decline in balance and strength. Able to ambulate around unit with CGA and no LOB, completed bed mobility and transfers along with LBD with supervision.           OT Goals       06/12/17 1100          Transfer Training OT LTG    Transfer Training OT LTG, Time to Achieve 5 days  -ST      Transfer Training OT LTG, Activity Type toilet;tub  -ST      Transfer Training OT LTG, [place] Level supervision required  -ST      Transfer Training OT LTG, Outcome goal ongoing  -ST      Strength OT LTG    Strength Goal OT LTG, Time to Achieve 5 days  -ST      Strength Goal OT LTG, Functional Goal Pt to be min A with BUE strengthening/ROM HEP to promote independence and engagement in daily tasks by d/c.   -ST      Strength Goal OT LTG, Outcome goal ongoing  -ST      Dynamic Standing Balance OT LTG    Dynamic Standing Balance OT LTG, Time to Achieve 5 days  -ST      Dynamic Standing Balance OT LTG, [place] Level supervision required  -ST      Dynamic Standing Balance OT LTG, Assist Device UE Support  -ST      Dynamic Standing Balance OT LTG, Additional Goal X8 mins with no LOB during fxnl task   -ST      Dynamic Standing Balance OT LTG, Outcome goal ongoing  -ST        User Key  (r) = Recorded By, (t) = Taken By, (c) = Cosigned By    Initials Name Provider Type    ST Alisa Booth OTR Occupational Therapist                Outcome Measures       06/12/17 1100          How much help from another is currently needed...    Putting on and taking off regular lower body clothing? 3  -ST      Bathing (including washing, rinsing, and drying) 3  -ST      Toileting (which includes using toilet bed pan or urinal) 3  -ST      Putting on and taking off regular upper body  clothing 3  -ST      Taking care of personal grooming (such as brushing teeth) 4  -ST      Eating meals 4  -ST      Score 20  -ST      Functional Assessment    Outcome Measure Options AM-PAC 6 Clicks Daily Activity (OT)  -ST        User Key  (r) = Recorded By, (t) = Taken By, (c) = Cosigned By    Initials Name Provider Type     MATT Graf Occupational Therapist          Time Calculation:   OT Start Time: 1100    Therapy Charges for Today     Code Description Service Date Service Provider Modifiers Qty    [id number]  OT EVAL MOD COMPLEXITY 4 6/12/2017 MATT Graf GO 1               MATT Coates  6/12/2017

## 2017-06-12 NOTE — PLAN OF CARE
Problem: Patient Care Overview (Adult)  Goal: Plan of Care Review  Outcome: Ongoing (interventions implemented as appropriate)    06/12/17 1100   Coping/Psychosocial Response Interventions   Plan Of Care Reviewed With patient   Outcome Evaluation   Outcome Summary/Follow up Plan Pending d/c home with family assist; recommend OP PT for high level balance training/strengthening. Pt appears to be at baseline w/cognition; however mild decline in balance and strength. Able to ambulate around unit with CGA and no LOB, completed bed mobility and transfers along with LBD with supervision.

## 2017-06-12 NOTE — PLAN OF CARE
Problem: Fall Risk (Adult)  Goal: Identify Related Risk Factors and Signs and Symptoms  Outcome: Ongoing (interventions implemented as appropriate)    06/12/17 0426   Fall Risk   Fall Risk: Related Risk Factors age-related changes;confusion/agitation;fear of falling;gait/mobility problems;history of falls   Fall Risk: Signs and Symptoms presence of risk factors       Goal: Absence of Falls  Outcome: Ongoing (interventions implemented as appropriate)    06/12/17 0426   Fall Risk (Adult)   Absence of Falls making progress toward outcome         Problem: Pressure Ulcer Risk (Rosalino Scale) (Adult,Obstetrics,Pediatric)  Goal: Identify Related Risk Factors and Signs and Symptoms  Outcome: Ongoing (interventions implemented as appropriate)    06/12/17 0426   Pressure Ulcer Risk (Rosalino Scale)   Related Risk Factors (Pressure Ulcer Risk (Rosalino Scale)) age extremes;cognitive impairment       Goal: Skin Integrity  Outcome: Ongoing (interventions implemented as appropriate)    06/12/17 0426   Pressure Ulcer Risk (Rosalino Scale) (Adult,Obstetrics,Pediatric)   Skin Integrity making progress toward outcome

## 2017-06-12 NOTE — DISCHARGE SUMMARY
HOSPITAL MEDICINE DISCHARGE SUMMARY    Date of Admission: 6/11/2017  Date of Discharge:  6/12/2017    Discharge Diagnoses:  Principal Problem:    Incarcerated hernia  Active Problems:    CAD (coronary artery disease)    Chronic atrial fibrillation    Essential hypertension    Hyperlipemia    Seizure disorder    Late onset Alzheimer's disease without behavioral disturbance    BPH (benign prostatic hyperplasia)    LEE on CPAP    GERD (gastroesophageal reflux disease)    Chronic kidney disease    Severe malnutrition      Presenting Problem/History of Present Illness  Incarcerated hernia [K46.0]  Patient is a 89 y.o. male presented with abdominal pain and incarcerated hernia.      Discharge Day HPI: Sitting up in bed. No complaints. Abdominal pain resolved.      Hospital Course:    Incarcerated inguinal hernia: 89 year old male transferred from [facility] due to concern of incarcerated inguinal hernia. Upon arrival the patient was seen by Dr. Fontaine (general surgery) who manually reduced the hernia without difficulty. His diet was advanced which he tolerated well and he was discharged home. He will follow up with his PCP in 1 week.      Procedures Performed     None    Consults:   Consults     Date and Time Order Name Status Description    6/11/2017 2026 Inpatient Consult to General Surgery            Pertinent Test Results:   Lab Results (last 7 days)     Procedure Component Value Units Date/Time    CBC (No Diff) [[id number]]  (Abnormal) Collected:  06/12/17 0315    Specimen:  Blood Updated:  06/12/17 0434     WBC 6.61 10*3/mm3      RBC 4.93 10*6/mm3      Hemoglobin 13.4 g/dL      Hematocrit 41.9 %      MCV 85.0 fL      MCH 27.2 pg      MCHC 32.0 g/dL      RDW 15.0 (H) %      RDW-SD 46.3 fl      MPV 9.7 fL      Platelets 245 10*3/mm3     Basic Metabolic Panel [[id number]]  (Normal) Collected:  06/12/17 0315    Specimen:  Blood Updated:  06/12/17 0449     Glucose 99 mg/dL      BUN 13 mg/dL      Creatinine  "0.90 mg/dL      Sodium 139 mmol/L      Potassium 3.8 mmol/L      Chloride 103 mmol/L      CO2 29.0 mmol/L      Calcium 9.6 mg/dL      eGFR Non African Amer 79 mL/min/1.73      BUN/Creatinine Ratio 14.4     Anion Gap 7.0 mmol/L     Narrative:       National Kid[employer] Guidelines    Stage     Description        GFR  1         Normal or High     90+  2         Mild decrease      60-89  3         Moderate decrease  30-59  4         Severe decrease    15-29  5         Kidney failure     <15        Imaging Results (all)     None            Physical Exam on Discharge:    /63 (BP Location: Right arm, Patient Position: Lying)  Pulse 53  Temp 98 °F (36.7 °C) (Oral)   Resp 18  Ht 65\" (165.1 cm)  Wt 101 lb 12.8 oz (46.2 kg)  SpO2 97%  BMI 16.94 kg/m2  Gen-no acute distress  Face-small head laceration over right forehead which is well healing  CV-RRR, S1 S2 normal, no m/r/g  Resp-CTAB, no wheezes  Abd-soft, NT, ND, +BS  Ext-multiple bruises throughout  Neuro-A&Ox3, no focal deficits  Psych-appropriate mood      Discharge Disposition  Home or Self Care    Discharge Medications   Paul Lyons   Home Medication Instructions KAYLIE:[id number]    Printed on:06/12/17 1202   Medication Information                      acetaminophen-codeine (TYLENOL #3) 300-30 MG per tablet  Take 1 tablet by mouth Daily As Needed.             apixaban (ELIQUIS) 2.5 MG tablet tablet  Take 1 tablet by mouth 2 (two) times a day.             aspirin (ASPIRIN LOW DOSE) 81 MG tablet  Take 81 mg by mouth Every Night.             dorzolamide (TRUSOPT) 2 % ophthalmic solution  Administer 1 drop to the right eye 2 (Two) Times a Day.             EPINEPHrine (EPIPEN 2-ANKITA) 0.3 MG/0.3ML solution auto-injector injection  EpiPen 2-Ankita 0.3 MG/0.3ML Injection Solution Auto-injector; Patient Sig: EpiPen 2-Ankita 0.3 MG/0.3ML Injection Solution Auto-injector ; 2; 0; 29-Apr-2014; Active             levETIRAcetam (KEPPRA) 500 MG tablet  Take 1 tablet by " mouth 3 (Three) Times a Day.             lisinopril (PRINIVIL,ZESTRIL) 10 MG tablet  Take 20 mg by mouth Daily.             memantine (NAMENDA) 5 MG tablet  Take 1 tablet by mouth 2 (Two) Times a Day.             pravastatin (PRAVACHOL) 40 MG tablet  Take 40 mg by mouth daily.             thiamine (VITAMINE B-1) 100 MG tablet  Take 100 mg by mouth Daily.             timolol (TIMOPTIC) 0.25 % ophthalmic solution  Administer 1 drop to the right eye Daily.             travoprost, [place] free, (TRAVATAN Z) 0.004 % solution ophthalmic solution  Apply 1 drop to eye Every Night.                 Discharge Diet: Cardiac      Activity at Discharge: As tolerated      Follow-up Appointments  Future Appointments  Date Time Provider Department Center   6/21/2017 10:15 AM Chris Saunders MD [facility] None   5/10/2018 9:15 AM Ender Garcia MD [facility] None     Additional Instructions for the Follow-ups that You Need to Schedule     Discharge Follow-up with PCP    As directed    Follow Up Details:  1 week                 Test Results Pending at Discharge    Time: Discharge 35 min

## 2017-06-12 NOTE — PLAN OF CARE
Problem: Inpatient Occupational Therapy  Goal: Transfer Training Goal 1 LTG- OT  Outcome: Ongoing (interventions implemented as appropriate)    06/12/17 1100   Transfer Training OT LTG   Transfer Training OT LTG, Time to Achieve 5 days   Transfer Training OT LTG, Activity Type toilet;tub   Transfer Training OT LTG, [place] Level supervision required   Transfer Training OT LTG, Outcome goal ongoing       Goal: Strength Goal LTG- OT  Outcome: Ongoing (interventions implemented as appropriate)    06/12/17 1100   Strength OT LTG   Strength Goal OT LTG, Time to Achieve 5 days   Strength Goal OT LTG, Functional Goal Pt to be min A with BUE strengthening/ROM HEP to promote independence and engagement in daily tasks by d/c.    Strength Goal OT LTG, Outcome goal ongoing       Goal: Dynamic Standing Balance Goal LTG-OT  Outcome: Ongoing (interventions implemented as appropriate)    06/12/17 1100   Dynamic Standing Balance OT LTG   Dynamic Standing Balance OT LTG, Time to Achieve 5 days   Dynamic Standing Balance OT LTG, [place] Level supervision required   Dynamic Standing Balance OT LTG, Assist Device UE Support   Dynamic Standing Balance OT LTG, Additional Goal X8 mins with no LOB during fxnl task    Dynamic Standing Balance OT LTG, Outcome goal ongoing

## 2017-06-12 NOTE — PROGRESS NOTES
" LOS: 1 day   Patient Care Team:  Blake Gomez MD as PCP - General        Subjective: Denies abd pain, no nausea      Objective     Vital Signs  Blood pressure 129/51, pulse 52, temperature 98 °F (36.7 °C), temperature source Oral, resp. rate 18, height 65\" (165.1 cm), weight 101 lb 12.8 oz (46.2 kg), SpO2 97 %.    Physical Exam:   abd soft, no distention, nontender,  LIH still reduced        Assessment/Plan    SBO secondary to incarcerated LIH that continues to be reduced.  Advance diet and ok to d/c home.    Principal Problem:    Incarcerated hernia  Active Problems:    CAD (coronary artery disease)    Chronic atrial fibrillation    Essential hypertension    Hyperlipemia    Seizure disorder    Late onset Alzheimer's disease without behavioral disturbance    BPH (benign prostatic hyperplasia)    LEE on CPAP    GERD (gastroesophageal reflux disease)    Chronic kidney disease    Severe malnutrition        Plan for disposition:Where: home    Sarah Fontaine MD  06/12/17  8:11 AM      "

## 2017-06-13 ENCOUNTER — HOSPITAL ENCOUNTER (INPATIENT)
Facility: HOSPITAL | Age: 82
LOS: 8 days | Discharge: HOME HEALTH | End: 2017-06-21
Attending: HOSPITALIST | Admitting: HOSPITALIST

## 2017-06-13 DIAGNOSIS — R13.12 OROPHARYNGEAL DYSPHAGIA: Primary | ICD-10-CM

## 2017-06-13 DIAGNOSIS — Z78.9 IMPAIRED MOBILITY AND ADLS: ICD-10-CM

## 2017-06-13 DIAGNOSIS — R27.0 ATAXIA: ICD-10-CM

## 2017-06-13 DIAGNOSIS — K40.30 INCARCERATED LEFT INGUINAL HERNIA: ICD-10-CM

## 2017-06-13 DIAGNOSIS — Z74.09 IMPAIRED MOBILITY AND ADLS: ICD-10-CM

## 2017-06-13 DIAGNOSIS — H91.90: ICD-10-CM

## 2017-06-13 DIAGNOSIS — Z74.09 IMPAIRED FUNCTIONAL MOBILITY, BALANCE, GAIT, AND ENDURANCE: ICD-10-CM

## 2017-06-13 DIAGNOSIS — K40.90 INGUINAL HERNIA: ICD-10-CM

## 2017-06-13 DIAGNOSIS — R53.1 WEAKNESS: ICD-10-CM

## 2017-06-13 LAB
ANION GAP SERPL CALCULATED.3IONS-SCNC: 8 MMOL/L (ref 3–11)
APTT PPP: 29.5 SECONDS (ref 24–31)
BUN BLD-MCNC: 16 MG/DL (ref 9–23)
BUN/CREAT SERPL: 17.8 (ref 7–25)
CALCIUM SPEC-SCNC: 9.5 MG/DL (ref 8.7–10.4)
CHLORIDE SERPL-SCNC: 103 MMOL/L (ref 99–109)
CO2 SERPL-SCNC: 29 MMOL/L (ref 20–31)
CREAT BLD-MCNC: 0.9 MG/DL (ref 0.6–1.3)
DEPRECATED RDW RBC AUTO: 46.3 FL (ref 37–54)
ERYTHROCYTE [DISTWIDTH] IN BLOOD BY AUTOMATED COUNT: 14.9 % (ref 11.3–14.5)
GFR SERPL CREATININE-BSD FRML MDRD: 79 ML/MIN/1.73
GLUCOSE BLD-MCNC: 90 MG/DL (ref 70–100)
HCT VFR BLD AUTO: 40.4 % (ref 38.9–50.9)
HGB BLD-MCNC: 12.9 G/DL (ref 13.1–17.5)
INR PPP: 1
MCH RBC QN AUTO: 27.2 PG (ref 27–31)
MCHC RBC AUTO-ENTMCNC: 31.9 G/DL (ref 32–36)
MCV RBC AUTO: 85.1 FL (ref 80–99)
PLATELET # BLD AUTO: 238 10*3/MM3 (ref 150–450)
PMV BLD AUTO: 9.7 FL (ref 6–12)
POTASSIUM BLD-SCNC: 4 MMOL/L (ref 3.5–5.5)
PROTHROMBIN TIME: 10.9 SECONDS (ref 9.6–11.5)
RBC # BLD AUTO: 4.75 10*6/MM3 (ref 4.2–5.76)
SODIUM BLD-SCNC: 140 MMOL/L (ref 132–146)
WBC NRBC COR # BLD: 7.65 10*3/MM3 (ref 3.5–10.8)

## 2017-06-13 PROCEDURE — 85027 COMPLETE CBC AUTOMATED: CPT | Performed by: HOSPITALIST

## 2017-06-13 PROCEDURE — 85730 THROMBOPLASTIN TIME PARTIAL: CPT | Performed by: HOSPITALIST

## 2017-06-13 PROCEDURE — 25010000002 LEVETIRACETAM IN NACL 0.82% 500 MG/100ML SOLUTION: Performed by: HOSPITALIST

## 2017-06-13 PROCEDURE — 99223 1ST HOSP IP/OBS HIGH 75: CPT | Performed by: HOSPITALIST

## 2017-06-13 PROCEDURE — 25010000002 HYDRALAZINE PER 20 MG: Performed by: HOSPITALIST

## 2017-06-13 PROCEDURE — 80048 BASIC METABOLIC PNL TOTAL CA: CPT | Performed by: HOSPITALIST

## 2017-06-13 PROCEDURE — 85610 PROTHROMBIN TIME: CPT | Performed by: HOSPITALIST

## 2017-06-13 RX ORDER — LORAZEPAM 2 MG/ML
0.25 INJECTION INTRAMUSCULAR EVERY 6 HOURS PRN
Status: DISCONTINUED | OUTPATIENT
Start: 2017-06-13 | End: 2017-06-17

## 2017-06-13 RX ORDER — LEVETIRACETAM 5 MG/ML
500 INJECTION INTRAVASCULAR EVERY 12 HOURS SCHEDULED
Status: DISCONTINUED | OUTPATIENT
Start: 2017-06-13 | End: 2017-06-17

## 2017-06-13 RX ORDER — HYDRALAZINE HYDROCHLORIDE 20 MG/ML
10 INJECTION INTRAMUSCULAR; INTRAVENOUS EVERY 6 HOURS PRN
Status: DISCONTINUED | OUTPATIENT
Start: 2017-06-13 | End: 2017-06-21 | Stop reason: HOSPADM

## 2017-06-13 RX ORDER — NALOXONE HCL 0.4 MG/ML
0.4 VIAL (ML) INJECTION
Status: DISCONTINUED | OUTPATIENT
Start: 2017-06-13 | End: 2017-06-21 | Stop reason: HOSPADM

## 2017-06-13 RX ORDER — LATANOPROST 50 UG/ML
1 SOLUTION/ DROPS OPHTHALMIC NIGHTLY
Status: DISCONTINUED | OUTPATIENT
Start: 2017-06-13 | End: 2017-06-21 | Stop reason: HOSPADM

## 2017-06-13 RX ORDER — SODIUM CHLORIDE 9 MG/ML
75 INJECTION, SOLUTION INTRAVENOUS CONTINUOUS
Status: DISCONTINUED | OUTPATIENT
Start: 2017-06-13 | End: 2017-06-19

## 2017-06-13 RX ORDER — GLIMEPIRIDE 2 MG/1
1 TABLET ORAL DAILY
Status: DISCONTINUED | OUTPATIENT
Start: 2017-06-13 | End: 2017-06-21 | Stop reason: HOSPADM

## 2017-06-13 RX ORDER — DORZOLAMIDE HCL 20 MG/ML
1 SOLUTION/ DROPS OPHTHALMIC 2 TIMES DAILY
Status: DISCONTINUED | OUTPATIENT
Start: 2017-06-13 | End: 2017-06-21 | Stop reason: HOSPADM

## 2017-06-13 RX ORDER — ONDANSETRON 2 MG/ML
4 INJECTION INTRAMUSCULAR; INTRAVENOUS EVERY 6 HOURS PRN
Status: DISCONTINUED | OUTPATIENT
Start: 2017-06-13 | End: 2017-06-21 | Stop reason: HOSPADM

## 2017-06-13 RX ORDER — MORPHINE SULFATE 2 MG/ML
1 INJECTION, SOLUTION INTRAMUSCULAR; INTRAVENOUS EVERY 4 HOURS PRN
Status: DISCONTINUED | OUTPATIENT
Start: 2017-06-13 | End: 2017-06-21 | Stop reason: HOSPADM

## 2017-06-13 RX ORDER — SODIUM CHLORIDE 0.9 % (FLUSH) 0.9 %
1-10 SYRINGE (ML) INJECTION AS NEEDED
Status: DISCONTINUED | OUTPATIENT
Start: 2017-06-13 | End: 2017-06-21 | Stop reason: HOSPADM

## 2017-06-13 RX ADMIN — HYDRALAZINE HYDROCHLORIDE 10 MG: 20 INJECTION INTRAMUSCULAR; INTRAVENOUS at 16:46

## 2017-06-13 RX ADMIN — LEVETIRACETAM 500 MG: 5 INJECTION INTRAVENOUS at 22:05

## 2017-06-13 RX ADMIN — LATANOPROST 1 DROP: 50 SOLUTION/ DROPS OPHTHALMIC at 22:05

## 2017-06-13 RX ADMIN — SODIUM CHLORIDE 75 ML/HR: 9 INJECTION, SOLUTION INTRAVENOUS at 16:46

## 2017-06-13 RX ADMIN — TIMOLOL MALEATE 1 DROP: 2.5 SOLUTION/ DROPS OPHTHALMIC at 16:47

## 2017-06-13 RX ADMIN — DORZOLAMIDE HYDROCHLORIDE 1 DROP: 20 SOLUTION/ DROPS OPHTHALMIC at 16:47

## 2017-06-13 NOTE — PLAN OF CARE
Problem: Patient Care Overview (Adult)  Goal: Plan of Care Review  Outcome: Ongoing (interventions implemented as appropriate)    06/13/17 1855   Coping/Psychosocial Response Interventions   Plan Of Care Reviewed With patient;spouse   Patient Care Overview   Progress no change   Outcome Evaluation   Outcome Summary/Follow up Plan SBP > 170, PRN given. No c/o pain. NPO until surgery sees patient. Confused at times. Multiple abrasions due to fall prior to admission.        Goal: Adult Individualization and Mutuality  Outcome: Ongoing (interventions implemented as appropriate)    06/13/17 1855   Individualization   Patient Specific Goals increase weight, be able to eat   Patient Specific Interventions bed alarm, waffle mattress       Goal: Discharge Needs Assessment  Outcome: Ongoing (interventions implemented as appropriate)    06/13/17 1426 06/13/17 1430 06/13/17 1855   Discharge Needs Assessment   Concerns To Be Addressed --  --  discharge planning concerns   Readmission Within The Last 30 Days --  --  previous discharge plan unsuccessful   Discharge Disposition --  --  still a patient   Living Environment   Transportation Available --  family or friend will provide --    Self-Care   Equipment Currently Used at Home none --  --          Problem: Fall Risk (Adult)  Goal: Identify Related Risk Factors and Signs and Symptoms  Outcome: Ongoing (interventions implemented as appropriate)    06/13/17 1855   Fall Risk   Fall Risk: Related Risk Factors age-related changes;fatigue/slow reaction;history of falls;gait/mobility problems;fear of falling;environment unfamiliar   Fall Risk: Signs and Symptoms presence of risk factors       Goal: Absence of Falls  Outcome: Ongoing (interventions implemented as appropriate)    06/13/17 1855   Fall Risk (Adult)   Absence of Falls making progress toward outcome         Problem: Pressure Ulcer Risk (Rosalino Scale) (Adult,Obstetrics,Pediatric)  Goal: Identify Related Risk Factors and  Signs and Symptoms  Outcome: Ongoing (interventions implemented as appropriate)    06/13/17 1855   Pressure Ulcer Risk (Rosalino Scale)   Related Risk Factors (Pressure Ulcer Risk (Rosalino Scale)) age extremes;body weight extremes;cognitive impairment;fluid intake inadequate;mechanical forces;mobility impaired       Goal: Skin Integrity  Outcome: Ongoing (interventions implemented as appropriate)    06/13/17 1855   Pressure Ulcer Risk (Rosalino Scale) (Adult,Obstetrics,Pediatric)   Skin Integrity making progress toward outcome         Problem: Nutrition, Imbalanced: Inadequate Oral Intake (Adult)  Goal: Identify Related Risk Factors and Signs and Symptoms  Outcome: Ongoing (interventions implemented as appropriate)    06/13/17 1855   Nutrition, Imbalanced: Inadequate Oral Intake   Nutrition Imbalanced: Less than Body Requirements: Related Risk Factors appetite decreased;knowledge deficit   Signs and Symptoms (Nutrition Imbalance, Inadequate Oral Intake: Signs and Symptoms) loss of subcutaneous fat;muscle mass/strength decreased;weight decreased (percent weight loss, percent usual body weight, body mass index less than 18.5) (Adults)       Goal: Improved Oral Intake  Outcome: Ongoing (interventions implemented as appropriate)    06/13/17 1855   Nutrition, Imbalanced: Inadequate Oral Intake (Adult)   Improved Oral Intake making progress toward outcome       Goal: Prevent Further Weight Loss  Outcome: Ongoing (interventions implemented as appropriate)    06/13/17 1855   Nutrition, Imbalanced: Inadequate Oral Intake (Adult)   Prevent Further Weight Loss making progress toward outcome

## 2017-06-13 NOTE — H&P
HOSPITAL MEDICINE HISTORY AND PHYSICAL    PCP: Blake Gomez MD    Chief Complaint: nausea, vomiting    Subjective     History of Present Illness  Mr. Lyons is an 88 yo M who was just discharged from [facility] yesterday after admission for incarcerated left inguinal hernia. This was reduced easily by Dr. Fontaine (Surgery) and no surgery was indicated, and so he was discharged home. Last night he started to have similar symptoms of nausea and vomiting again, prompting his family to take him to the ER in [place], where a CT abd/pelvis without contrast showed a partial obstruction due to an incarcerated left inguinal hernia. Dr. Grey was contacted and accepted patient for transfer, hospitalist service will be admitting. Patient is on Eliquis for chronic Afib and last dose was last evening. Currently patient denies any pain, nausea, or vomiting. Family member at bedside and says he has dementia and is a poor historian.     Review of Systems   Otherwise complete ROS is negative except as mentioned in the HPI.    Past Medical History:   Past Medical History:   Diagnosis Date   • Abnormal CT scan, lung     PULMONARY NODULE (<6CM)   • Atrial fibrillation    • BPH (benign prostatic hyperplasia) 4/26/2017   • BPH (benign prostatic hypertrophy)    • Bradycardia 4/26/2017   • Cardiac disorder    • Cataract     STATUS POST REMOVAL WITH LENS IMPLANT   • Coronary artery disease    • Esophageal stricture     WTIH NOTED DILATION   • GERD (gastroesophageal reflux disease)    • Glaucoma    • Glaucoma    • H/O inguinal hernia repair    • Hiatal hernia    • Hiatal hernia with GERD 4/26/2017   • Hypercholesteremia    • Hypertension    • Malignant neoplasm    • Obstructive sleep apnea on CPAP    • LEE on CPAP 4/26/2017   • Paroxysmal atrial fibrillation    • Peripheral vascular disease    • Right upper lobe pneumonia    • Seizure disorder    • Skin cancer     WITH REMOVAL   • TIA (transient ischemic attack) 02/2013    Carotid duplex  50% right ICA.       Past Surgical History:  Past Surgical History:   Procedure Laterality Date   • BACK SURGERY     • CATARACT EXTRACTION WITH INTRAOCULAR LENS IMPLANT     • CERVICAL LAMINECTOMY     • CORONARY ARTERY BYPASS GRAFT  04/15/2002    x4   • ESOPHAGEAL DILATATION     • HERNIA REPAIR     • LUMBAR LAMINECTOMY  02/2012   • SKIN CANCER EXCISION         Family History: family history includes Coronary artery disease in his other; Stroke in his other.     Social History:  reports that he has quit smoking. He does not have any smokeless tobacco history on file. He reports that he does not drink alcohol or use illicit drugs.    Medications:  Prescriptions Prior to Admission   Medication Sig Dispense Refill Last Dose   • acetaminophen-codeine (TYLENOL #3) 300-30 MG per tablet Take 1 tablet by mouth Daily As Needed.   Taking   • apixaban (ELIQUIS) 2.5 MG tablet tablet Take 1 tablet by mouth 2 (two) times a day. 60 tablet 11 Taking   • aspirin (ASPIRIN LOW DOSE) 81 MG tablet Take 81 mg by mouth Every Night.   Taking   • dorzolamide (TRUSOPT) 2 % ophthalmic solution Administer 1 drop to the right eye 2 (Two) Times a Day.  11 Taking   • EPINEPHrine (EPIPEN 2-ANKITA) 0.3 MG/0.3ML solution auto-injector injection EpiPen 2-Ankita 0.3 MG/0.3ML Injection Solution Auto-injector; Patient Sig: EpiPen 2-Ankita 0.3 MG/0.3ML Injection Solution Auto-injector ; 2; 0; 29-Apr-2014; Active   Taking   • levETIRAcetam (KEPPRA) 500 MG tablet Take 1 tablet by mouth 3 (Three) Times a Day. 270 tablet 3    • lisinopril (PRINIVIL,ZESTRIL) 10 MG tablet Take 20 mg by mouth Daily.   Taking   • memantine (NAMENDA) 5 MG tablet Take 1 tablet by mouth 2 (Two) Times a Day. 180 tablet 3    • pravastatin (PRAVACHOL) 40 MG tablet Take 40 mg by mouth daily.   Taking   • thiamine (VITAMINE B-1) 100 MG tablet Take 100 mg by mouth Daily.   Taking   • timolol (TIMOPTIC) 0.25 % ophthalmic solution Administer 1 drop to the right eye Daily.   Taking   • travoprost, BAK  "free, (TRAVATAN Z) 0.004 % solution ophthalmic solution Apply 1 drop to eye Every Night.   Taking     Allergies   Allergen Reactions   • Bee Venom    • Lipitor [Atorvastatin] Myalgia       Objective    Physical Exam:   Vital Signs: /61 (BP Location: Right arm, Patient Position: Lying)  Pulse 50  Temp 97.5 °F (36.4 °C) (Oral)   Resp 16  Ht 67\" (170.2 cm)  Wt 102 lb 8 oz (46.5 kg)  SpO2 96%  BMI 16.05 kg/m2  Physical Exam  Temp:  [97.5 °F (36.4 °C)] 97.5 °F (36.4 °C)  Heart Rate:  [50] 50  Resp:  [16] 16  BP: (153-172)/(61-66) 153/61  Constitutional: no acute distress, awake, alert  Eyes: PERRLA, sclerae anicteric, no conjunctival injection  Skin: healing scabs on forehead from recent fall  Neck: supple, no thyromegaly, trachea midline  Respiratory: Clear to auscultation bilaterally, nonlabored respirations   Cardiovascular: RRR, no murmurs, rubs, or gallops, palpable pedal pulses bilaterally  Gastrointestinal: Positive bowel sounds, soft, nontender, nondistended; left inguinal hernia present  Musculoskeletal: No bilateral ankle edema, no clubbing or cyanosis to bilateral lower extremities  Psychiatric: oriented x 3, mild confusion regarding details of recent events, appropriate affect, cooperative  Neurologic: Strength symmetric in all extremities, Cranial Nerves grossly intact to confrontation     Results Reviewed:  I have personally reviewed current lab, radiology, and data and agree.    Results from last 7 days  Lab Units 06/12/17  0315   WBC 10*3/mm3 6.61   HEMOGLOBIN g/dL 13.4   PLATELETS 10*3/mm3 245       Results from last 7 days  Lab Units 06/12/17  0315   SODIUM mmol/L 139   POTASSIUM mmol/L 3.8   TOTAL CO2 mmol/L 29.0   CREATININE mg/dL 0.90   GLUCOSE mg/dL 99   CALCIUM mg/dL 9.6           Assessment/Plan   Assessment & Plan  Principal Problem:    Incarcerated left inguinal hernia  Active Problems:    Chronic atrial fibrillation    Essential hypertension    Seizure disorder    Late onset " Alzheimer's disease without behavioral disturbance    88 yo M with hx of chronic Afib on Eliquis, dementia, HTN, and seizure disorder, presents due to recurrent of incarcerated left inguinal hernia, unable to be reduced at the OSH.     PLAN:  --Labs at OSH reviewed and unremarkable.  --Keep NPO, start gentle fluids.   --Consult General Surgery, Dr. Grey.  --Hold Eliquis in anticipation of surgical intervention.  --Hold oral meds,will switch to IV Keppra.  --Pain meds and antiemetics PRN.      I discussed the patients findings and my recommendations with: patient, spouse    Lydia Payne MD   06/13/17   3:32 PM

## 2017-06-14 LAB
ANION GAP SERPL CALCULATED.3IONS-SCNC: 6 MMOL/L (ref 3–11)
APTT PPP: 30.5 SECONDS (ref 45–60)
APTT PPP: 40.2 SECONDS (ref 45–60)
BUN BLD-MCNC: 15 MG/DL (ref 9–23)
BUN/CREAT SERPL: 18.8 (ref 7–25)
CALCIUM SPEC-SCNC: 9 MG/DL (ref 8.7–10.4)
CHLORIDE SERPL-SCNC: 104 MMOL/L (ref 99–109)
CO2 SERPL-SCNC: 30 MMOL/L (ref 20–31)
CREAT BLD-MCNC: 0.8 MG/DL (ref 0.6–1.3)
DEPRECATED RDW RBC AUTO: 47.6 FL (ref 37–54)
ERYTHROCYTE [DISTWIDTH] IN BLOOD BY AUTOMATED COUNT: 15.1 % (ref 11.3–14.5)
GFR SERPL CREATININE-BSD FRML MDRD: 91 ML/MIN/1.73
GLUCOSE BLD-MCNC: 70 MG/DL (ref 70–100)
GLUCOSE BLDC GLUCOMTR-MCNC: 68 MG/DL (ref 70–130)
GLUCOSE BLDC GLUCOMTR-MCNC: 92 MG/DL (ref 70–130)
HCT VFR BLD AUTO: 36.4 % (ref 38.9–50.9)
HGB BLD-MCNC: 11.7 G/DL (ref 13.1–17.5)
INR PPP: 1.05
MCH RBC QN AUTO: 27.6 PG (ref 27–31)
MCHC RBC AUTO-ENTMCNC: 32.1 G/DL (ref 32–36)
MCV RBC AUTO: 85.8 FL (ref 80–99)
PLATELET # BLD AUTO: 221 10*3/MM3 (ref 150–450)
PMV BLD AUTO: 9.8 FL (ref 6–12)
POTASSIUM BLD-SCNC: 3.7 MMOL/L (ref 3.5–5.5)
PROTHROMBIN TIME: 11.5 SECONDS (ref 9.6–11.5)
RBC # BLD AUTO: 4.24 10*6/MM3 (ref 4.2–5.76)
SODIUM BLD-SCNC: 140 MMOL/L (ref 132–146)
WBC NRBC COR # BLD: 5.13 10*3/MM3 (ref 3.5–10.8)

## 2017-06-14 PROCEDURE — 99232 SBSQ HOSP IP/OBS MODERATE 35: CPT | Performed by: INTERNAL MEDICINE

## 2017-06-14 PROCEDURE — 85027 COMPLETE CBC AUTOMATED: CPT | Performed by: HOSPITALIST

## 2017-06-14 PROCEDURE — 82962 GLUCOSE BLOOD TEST: CPT

## 2017-06-14 PROCEDURE — 25010000002 HEPARIN (PORCINE) PER 1000 UNITS

## 2017-06-14 PROCEDURE — 25010000002 LEVETIRACETAM IN NACL 0.82% 500 MG/100ML SOLUTION: Performed by: HOSPITALIST

## 2017-06-14 PROCEDURE — 85730 THROMBOPLASTIN TIME PARTIAL: CPT | Performed by: INTERNAL MEDICINE

## 2017-06-14 PROCEDURE — 85610 PROTHROMBIN TIME: CPT | Performed by: INTERNAL MEDICINE

## 2017-06-14 PROCEDURE — 25010000002 HEPARIN (PORCINE) PER 1000 UNITS: Performed by: INTERNAL MEDICINE

## 2017-06-14 PROCEDURE — 85730 THROMBOPLASTIN TIME PARTIAL: CPT

## 2017-06-14 PROCEDURE — 80048 BASIC METABOLIC PNL TOTAL CA: CPT | Performed by: HOSPITALIST

## 2017-06-14 RX ORDER — HEPARIN SODIUM 1000 [USP'U]/ML
1000 INJECTION, SOLUTION INTRAVENOUS; SUBCUTANEOUS ONCE
Status: COMPLETED | OUTPATIENT
Start: 2017-06-14 | End: 2017-06-14

## 2017-06-14 RX ADMIN — DORZOLAMIDE HYDROCHLORIDE 1 DROP: 20 SOLUTION/ DROPS OPHTHALMIC at 08:11

## 2017-06-14 RX ADMIN — SODIUM CHLORIDE 75 ML/HR: 9 INJECTION, SOLUTION INTRAVENOUS at 22:15

## 2017-06-14 RX ADMIN — DORZOLAMIDE HYDROCHLORIDE 1 DROP: 20 SOLUTION/ DROPS OPHTHALMIC at 17:08

## 2017-06-14 RX ADMIN — LATANOPROST 1 DROP: 50 SOLUTION/ DROPS OPHTHALMIC at 22:14

## 2017-06-14 RX ADMIN — LEVETIRACETAM 500 MG: 5 INJECTION INTRAVENOUS at 22:14

## 2017-06-14 RX ADMIN — LEVETIRACETAM 500 MG: 5 INJECTION INTRAVENOUS at 08:11

## 2017-06-14 RX ADMIN — HEPARIN SODIUM 12 UNITS/KG/HR: 10000 INJECTION, SOLUTION INTRAVENOUS at 11:00

## 2017-06-14 RX ADMIN — TIMOLOL MALEATE 1 DROP: 2.5 SOLUTION/ DROPS OPHTHALMIC at 08:11

## 2017-06-14 RX ADMIN — HEPARIN SODIUM 1000 UNITS: 1000 INJECTION, SOLUTION INTRAVENOUS; SUBCUTANEOUS at 17:44

## 2017-06-14 NOTE — PLAN OF CARE
Problem: Patient Care Overview (Adult)  Goal: Plan of Care Review  Outcome: Ongoing (interventions implemented as appropriate)    06/14/17 0450   Coping/Psychosocial Response Interventions   Plan Of Care Reviewed With patient;spouse   Patient Care Overview   Progress no change         Problem: Fall Risk (Adult)  Goal: Absence of Falls  Outcome: Ongoing (interventions implemented as appropriate)    06/14/17 0450   Fall Risk (Adult)   Absence of Falls making progress toward outcome         Problem: Pressure Ulcer Risk (Rosalino Scale) (Adult,Obstetrics,Pediatric)  Goal: Identify Related Risk Factors and Signs and Symptoms  Outcome: Ongoing (interventions implemented as appropriate)    06/13/17 1855   Pressure Ulcer Risk (Rosalino Scale)   Related Risk Factors (Pressure Ulcer Risk (Rosalino Scale)) age extremes;body weight extremes;cognitive impairment;fluid intake inadequate;mechanical forces;mobility impaired       Goal: Skin Integrity  Outcome: Ongoing (interventions implemented as appropriate)    06/14/17 0450   Pressure Ulcer Risk (Rosalino Scale) (Adult,Obstetrics,Pediatric)   Skin Integrity making progress toward outcome         Problem: Nutrition, Imbalanced: Inadequate Oral Intake (Adult)  Goal: Identify Related Risk Factors and Signs and Symptoms    06/13/17 1855 06/14/17 0450   Nutrition, Imbalanced: Inadequate Oral Intake   Nutrition Imbalanced: Less than Body Requirements: Related Risk Factors appetite decreased;knowledge deficit --    Signs and Symptoms (Nutrition Imbalance, Inadequate Oral Intake: Signs and Symptoms) --  loss of subcutaneous fat;wound healing poor       Goal: Identify Related Risk Factors and Signs and Symptoms  Outcome: Ongoing (interventions implemented as appropriate)    06/13/17 1855 06/14/17 0450   Nutrition, Imbalanced: Inadequate Oral Intake   Nutrition Imbalanced: Less than Body Requirements: Related Risk Factors appetite decreased;knowledge deficit --    Signs and Symptoms (Nutrition  Imbalance, Inadequate Oral Intake: Signs and Symptoms) --  loss of subcutaneous fat;wound healing poor

## 2017-06-14 NOTE — CONSULTS
"Adult Nutrition  Assessment/PES    Patient Name:  Paul Lyons  YOB: 1927  MRN: [id number]  Admit Date:  6/13/2017    Assessment Date:  6/14/2017        Reason for Assessment       06/14/17 1221    Reason for Assessment    Reason For Assessment/Visit identified at risk by screening criteria;physician consult    Identified At Risk By Screening Criteria MST SCORE 2+    Time Spent (min) 20    Cardiac PAF;HTN    Gastrointestinal SBO   incarcerated hernia    Neurological Seizure disorder;Alzheimer's              Nutrition/Diet History       06/14/17 1225    Nutrition/Diet History    Reported/Observed By Patient    Other Pt seemed slightly confused at time of visit with no family present.  Pt is unsure of amount of weight lost and the time frame.  Pt stated that his UBW is 110#-120# but is unsure of when he last weighed that, stating \"I don't know, maybe a year ot two.\"  Pt reports appetite to be good until hospitalization, however said that he hasn't eaten since Sunday.  Will revisit when family present to try to quantify weight loss.             Anthropometrics       06/14/17 1231    Anthropometrics    Height 170.2 cm (67\")    Weight 46.5 kg (102 lb 8.2 oz)    Ideal Body Weight (IBW)    Ideal Body Weight (IBW), Male (kg) 68.1    % Ideal Body Weight 68.43    Body Mass Index (BMI)    BMI (kg/m2) 16.09            Labs/Tests/Procedures/Meds       06/14/17 1231    Labs/Tests/Procedures/Meds    Labs/Tests Review Reviewed                Nutrition Prescription Ordered       06/14/17 1232    Nutrition Prescription PO    Current PO Diet Clear Liquid            Evaluation of Received Nutrient/Fluid Intake       06/14/17 1232    PO Evaluation    Number of Days PO Intake Evaluated Insufficient Data              Problem/Interventions:        Problem 1       06/14/17 1232    Nutrition Diagnoses Problem 1    Problem 1 Predicted Suboptimal Intake    Etiology (related to) Medical Diagnosis   clinical condition    " Signs/Symptoms (evidenced by) Clear Liquid Diet                    Intervention Goal       06/14/17 1233    Intervention Goal    General Nutrition support treatment    PO Establish PO;Tolerate PO            Nutrition Intervention       06/14/17 1233    Nutrition Intervention    RD/Tech Action Encourage intake;Follow Tx progress;Care plan reviewd              Education/Evaluation       06/14/17 1233    Monitor/Evaluation    Monitor Per protocol;PO intake        Comments:      Electronically signed by:  Kimberly Bullock CNA  06/14/17 1:04 PM

## 2017-06-14 NOTE — PROGRESS NOTES
"                            Surgical Oncology Progress Note                                 Admit Date: 6/13/2017  Subjective:     Patient denies nausea, vomiting, or abdominal pain.  No events overnight.    Objective:   /51 (BP Location: Left arm, Patient Position: Lying)  Pulse (!) 41  Temp 98 °F (36.7 °C) (Oral)   Resp 16  Ht 67\" (170.2 cm)  Wt 102 lb 8 oz (46.5 kg)  SpO2 100%  BMI 16.05 kg/m2    Intake/Output Summary (Last 24 hours) at 06/14/17 1044  Last data filed at 06/14/17 0539   Gross per 24 hour   Intake                0 ml   Output              375 ml   Net             -375 ml     Physical Exam:  Physical Exam  Vitals:    06/14/17 0716   BP: 134/51   Pulse: (!) 41   Resp: 16   Temp: 98 °F (36.7 °C)   SpO2:      General: awake, alert, comfortable  Pulm: CTAB  CVS: no M/R/G, reg rate  Abd: soft, NT, ND, NABS, Left inguinal hernia is fully reduced.  Ext: warm, well perfused      Labs: CBC and Chemistry    Results from last 7 days  Lab Units 06/14/17  0403 06/13/17  1627 06/12/17  0315   WBC 10*3/mm3 5.13 7.65 6.61   HEMOGLOBIN g/dL 11.7* 12.9* 13.4   HEMATOCRIT % 36.4* 40.4 41.9   MCV fL 85.8 85.1 85.0   PLATELETS 10*3/mm3 221 238 245   GLUCOSE mg/dL 70 90 99   CALCIUM mg/dL 9.0 9.5 9.6   SODIUM mmol/L 140 140 139   POTASSIUM mmol/L 3.7 4.0 3.8   TOTAL CO2 mmol/L 30.0 29.0 29.0   CHLORIDE mmol/L 104 103 103   BUN mg/dL 15 16 13   CREATININE mg/dL 0.80 0.90 0.90   EGFR IF NONAFRICN AM mL/min/1.73 91 79 79   BUN / CREAT RATIO  18.8 17.8 14.4   ANION GAP mmol/L 6.0 8.0 7.0       Assessment / Plan:     Paul Lyons is a 89 y.o. year old patient With a large left inguinal hernia that is fully reduced, as well as an improving partial small bowel obstruction.  The patient's abdomen is soft and nontender.  He can be placed on a clear liquid diet today and I will schedule him for surgery tomorrow.  I tried to reach the family, and left a message on the patient's home phone number.  I will try to " communicate with the patient's spouse today.    Riaz Grey MD  Surgical Oncology

## 2017-06-14 NOTE — PROGRESS NOTES
"Adult Nutrition  Assessment/PES    Patient Name:  Paul Lyons  YOB: 1927  MRN: [id number]  Admit Date:  6/13/2017    Assessment Date:  6/14/2017        Reason for Assessment       06/14/17 1450    Reason for Assessment    Time Spent (min) 10      06/14/17 1221    Reason for Assessment    Reason For Assessment/Visit identified at risk by screening criteria;physician consult    Identified At Risk By Screening Criteria MST SCORE 2+    Time Spent (min) 20    Cardiac PAF;HTN    Gastrointestinal SBO   incarcerated hernia    Neurological Seizure disorder;Alzheimer's            Data Eval/ Notes       06/14/17 1449     Notes    Reason Routine Rounds    Note RD will initiate boost breeze to optimize p.o. diet. Send every meal.            Nutrition/Diet History       06/14/17 1225    Nutrition/Diet History    Reported/Observed By Patient    Other Pt seemed slightly confused at time of visit with no family present.  Pt is unsure of amount of weight lost and the time frame.  Pt stated that his UBW is 110#-120# but is unsure of when he last weighed that, stating \"I don't know, maybe a year ot two.\"  Pt reports appetite to be good until hospitalization, however said that he hasn't eaten since Sunday.  Will revisit when family present to try to quantify weight loss.             Anthropometrics       06/14/17 1231    Anthropometrics    Height 170.2 cm (67\")    Weight 46.5 kg (102 lb 8.2 oz)    Ideal Body Weight (IBW)    Ideal Body Weight (IBW), Male (kg) 68.1    % Ideal Body Weight 68.43    Body Mass Index (BMI)    BMI (kg/m2) 16.09            Labs/Tests/Procedures/Meds       06/14/17 1231    Labs/Tests/Procedures/Meds    Labs/Tests Review Reviewed                Nutrition Prescription Ordered       06/14/17 1232    Nutrition Prescription PO    Current PO Diet Clear Liquid            Evaluation of Received Nutrient/Fluid Intake       06/14/17 1232    PO Evaluation    Number of Days PO " Intake Evaluated Insufficient Data              Problem/Interventions:        Problem 1       06/14/17 1232    Nutrition Diagnoses Problem 1    Problem 1 Predicted Suboptimal Intake    Etiology (related to) Medical Diagnosis   clinical condition    Signs/Symptoms (evidenced by) Clear Liquid Diet                    Intervention Goal       06/14/17 1233    Intervention Goal    General Nutrition support treatment    PO Establish PO;Tolerate PO            Nutrition Intervention       06/14/17 1233    Nutrition Intervention    RD/Tech Action Encourage intake;Follow Tx progress;Care plan reviewd              Education/Evaluation       06/14/17 1233    Monitor/Evaluation    Monitor Per protocol;PO intake            Electronically signed by:  Tatiana Presley RD  06/14/17 2:51 PM

## 2017-06-14 NOTE — PLAN OF CARE
Problem: Patient Care Overview (Adult)  Goal: Plan of Care Review  Outcome: Ongoing (interventions implemented as appropriate)    06/14/17 1050   Coping/Psychosocial Response Interventions   Plan Of Care Reviewed With patient;spouse   Patient Care Overview   Progress no change   Outcome Evaluation   Outcome Summary/Follow up Plan Patient consult for present on admission redness on left shin from EFREN hose. Exam reveals area has resolved-all blanching. Patient has multiple healing abrasions from a recent fall. All skin integrity nursing measures in place and implemented. WOCN will sign off on this patient. Please notify for any new concerns.         Problem: Skin Integrity Impairment, Risk/Actual (Adult)  Goal: Identify Related Risk Factors and Signs and Symptoms  Outcome: Ongoing (interventions implemented as appropriate)    06/14/17 1050   Skin Integrity Impairment, Risk/Actual   Skin Integrity Impairment, Risk/Actual: Related Risk Factors age extremes;cognitive impairment;fluid/nutrition status;immobility;infection/disease process;traumatic injury   Signs and Symptoms (Skin Integrity Impairment) (multiple abrasions from fall)       Goal: Skin Integrity/Wound Healing  Outcome: Ongoing (interventions implemented as appropriate)    06/14/17 1050   Skin Integrity Impairment, Risk/Actual (Adult)   Skin Integrity/Wound Healing making progress toward outcome

## 2017-06-14 NOTE — PLAN OF CARE
Problem: Patient Care Overview (Adult)  Goal: Plan of Care Review  Outcome: Ongoing (interventions implemented as appropriate)    06/14/17 1608   Coping/Psychosocial Response Interventions   Plan Of Care Reviewed With patient   Patient Care Overview   Progress no change       Goal: Adult Individualization and Mutuality  Outcome: Ongoing (interventions implemented as appropriate)    06/13/17 1855   Individualization   Patient Specific Goals increase weight, be able to eat   Patient Specific Interventions bed alarm, waffle mattress       Goal: Discharge Needs Assessment  Outcome: Ongoing (interventions implemented as appropriate)    06/13/17 1426 06/13/17 1800 06/13/17 1855   Discharge Needs Assessment   Concerns To Be Addressed --  --  discharge planning concerns   Readmission Within The Last 30 Days --  --  previous discharge plan unsuccessful   Discharge Disposition --  --  still a patient   Living Environment   Transportation Available --  family or friend will provide --    Self-Care   Equipment Currently Used at Home none --  --          Problem: Fall Risk (Adult)  Goal: Identify Related Risk Factors and Signs and Symptoms  Outcome: Ongoing (interventions implemented as appropriate)    06/13/17 1855   Fall Risk   Fall Risk: Related Risk Factors age-related changes;fatigue/slow reaction;history of falls;gait/mobility problems;fear of falling;environment unfamiliar   Fall Risk: Signs and Symptoms presence of risk factors       Goal: Absence of Falls  Outcome: Ongoing (interventions implemented as appropriate)    06/14/17 1608   Fall Risk (Adult)   Absence of Falls making progress toward outcome         Problem: Pressure Ulcer Risk (Rosalino Scale) (Adult,Obstetrics,Pediatric)  Goal: Identify Related Risk Factors and Signs and Symptoms  Outcome: Ongoing (interventions implemented as appropriate)    06/13/17 1855   Pressure Ulcer Risk (Rosalino Scale)   Related Risk Factors (Pressure Ulcer Risk (Rosalino Scale)) age  extremes;body weight extremes;cognitive impairment;fluid intake inadequate;mechanical forces;mobility impaired       Goal: Skin Integrity  Outcome: Ongoing (interventions implemented as appropriate)    06/14/17 1608   Pressure Ulcer Risk (Rosalino Scale) (Adult,Obstetrics,Pediatric)   Skin Integrity making progress toward outcome         Problem: Nutrition, Imbalanced: Inadequate Oral Intake (Adult)  Goal: Identify Related Risk Factors and Signs and Symptoms  Outcome: Ongoing (interventions implemented as appropriate)    06/13/17 1855 06/14/17 0450   Nutrition, Imbalanced: Inadequate Oral Intake   Nutrition Imbalanced: Less than Body Requirements: Related Risk Factors appetite decreased;knowledge deficit --    Signs and Symptoms (Nutrition Imbalance, Inadequate Oral Intake: Signs and Symptoms) --  loss of subcutaneous fat;wound healing poor       Goal: Improved Oral Intake  Outcome: Ongoing (interventions implemented as appropriate)    06/14/17 1608   Nutrition, Imbalanced: Inadequate Oral Intake (Adult)   Improved Oral Intake making progress toward outcome       Goal: Prevent Further Weight Loss  Outcome: Ongoing (interventions implemented as appropriate)    06/14/17 1608   Nutrition, Imbalanced: Inadequate Oral Intake (Adult)   Prevent Further Weight Loss making progress toward outcome         Problem: Skin Integrity Impairment, Risk/Actual (Adult)  Goal: Identify Related Risk Factors and Signs and Symptoms  Outcome: Ongoing (interventions implemented as appropriate)    06/14/17 1050   Skin Integrity Impairment, Risk/Actual   Skin Integrity Impairment, Risk/Actual: Related Risk Factors age extremes;cognitive impairment;fluid/nutrition status;immobility;infection/disease process;traumatic injury       Goal: Skin Integrity/Wound Healing  Outcome: Ongoing (interventions implemented as appropriate)    06/14/17 1608   Skin Integrity Impairment, Risk/Actual (Adult)   Skin Integrity/Wound Healing making progress toward  outcome

## 2017-06-14 NOTE — CONSULTS
"                       Surgical Oncology Consultation Note                               Admit Date: 6/13/2017  Referring Physician: Lydia ARRINGTON MD  Date of Consultations: 06/13/17    Reason for consulation: left inguinal hernia    Chief Complaint: none    History of Present Illness    Paul Lyons is a 89 y.o. male who I am asked to see in consultation by Dr. Lydia ARRINGTON MD for evaluation of a left inguinal hernia and small bowel obstruction.  The patient was seen in the ED at [facility] 2 days ago for a left inguinal hernia.  The hernia was reduced by Dr. Fontaine and the patient's family declined surgery.  He developed recurrence of the hernia and presented to an outside ED earlier today with SBO.  CT scan showed dilated loops of small bowel with a transition in the left inguinal hernia.  A general surgeon at the outside hospital was able to reduce the hernia and he was transferred to [facility] for management and possible surgical repair.  This evening the patient denies nausea or vomiting.  He says he is passing flatus but hasn't had a bowel movement, \"in ages.\"  He denies fevers, chills, or sweats.  The patient takes Eliquis and his last dose was yesterday evening.      Past Medical History:  Past Medical History:   Diagnosis Date   • Abnormal CT scan, lung     PULMONARY NODULE (<6CM)   • Atrial fibrillation    • BPH (benign prostatic hyperplasia) 4/26/2017   • BPH (benign prostatic hypertrophy)    • Bradycardia 4/26/2017   • Cardiac disorder    • Cataract     STATUS POST REMOVAL WITH LENS IMPLANT   • Coronary artery disease    • Esophageal stricture     WTIH NOTED DILATION   • GERD (gastroesophageal reflux disease)    • Glaucoma    • Glaucoma    • H/O inguinal hernia repair    • Hiatal hernia    • Hiatal hernia with GERD 4/26/2017   • Hypercholesteremia    • Hypertension    • Malignant neoplasm    • Obstructive sleep apnea on CPAP    • LEE on CPAP 4/26/2017   • Paroxysmal atrial fibrillation    • Peripheral " vascular disease    • Right upper lobe pneumonia    • Seizure disorder    • Skin cancer     WITH REMOVAL   • TIA (transient ischemic attack) 02/2013    Carotid duplex 50% right ICA.     Past Surgical History:  Past Surgical History:   Procedure Laterality Date   • BACK SURGERY     • CATARACT EXTRACTION WITH INTRAOCULAR LENS IMPLANT     • CERVICAL LAMINECTOMY     • CORONARY ARTERY BYPASS GRAFT  04/15/2002    x4   • ESOPHAGEAL DILATATION     • HERNIA REPAIR     • LUMBAR LAMINECTOMY  02/2012   • SKIN CANCER EXCISION       Medications:  Prescriptions Prior to Admission   Medication Sig Dispense Refill Last Dose   • acetaminophen-codeine (TYLENOL #3) 300-30 MG per tablet Take 1 tablet by mouth Daily As Needed.   Taking   • apixaban (ELIQUIS) 2.5 MG tablet tablet Take 1 tablet by mouth 2 (two) times a day. 60 tablet 11 Taking   • aspirin (ASPIRIN LOW DOSE) 81 MG tablet Take 81 mg by mouth Every Night.   Taking   • dorzolamide (TRUSOPT) 2 % ophthalmic solution Administer 1 drop to the right eye 2 (Two) Times a Day.  11 Taking   • EPINEPHrine (EPIPEN 2-ANKITA) 0.3 MG/0.3ML solution auto-injector injection EpiPen 2-Ankita 0.3 MG/0.3ML Injection Solution Auto-injector; Patient Sig: EpiPen 2-Ankita 0.3 MG/0.3ML Injection Solution Auto-injector ; 2; 0; 29-Apr-2014; Active   Taking   • levETIRAcetam (KEPPRA) 500 MG tablet Take 1 tablet by mouth 3 (Three) Times a Day. 270 tablet 3    • lisinopril (PRINIVIL,ZESTRIL) 10 MG tablet Take 20 mg by mouth Daily.   Taking   • memantine (NAMENDA) 5 MG tablet Take 1 tablet by mouth 2 (Two) Times a Day. 180 tablet 3    • pravastatin (PRAVACHOL) 40 MG tablet Take 40 mg by mouth daily.   Taking   • thiamine (VITAMINE B-1) 100 MG tablet Take 100 mg by mouth Daily.   Taking   • timolol (TIMOPTIC) 0.25 % ophthalmic solution Administer 1 drop to the right eye Daily.   Taking   • travoprost, BAK free, (TRAVATAN Z) 0.004 % solution ophthalmic solution Apply 1 drop to eye Every Night.   Taking  "    Allergies:  Allergies   Allergen Reactions   • Bee Venom    • Lipitor [Atorvastatin] Myalgia     Family History  Family History   Problem Relation Age of Onset   • Coronary artery disease Other    • Stroke Other      Social History  Social History   Substance Use Topics   • Smoking status: Former Smoker   • Smokeless tobacco: None      Comment: QUIT MANY YEARS AGO   • Alcohol use No     Review of Systems  Pertinent items are noted in HPI, all other systems reviewed and negative    Physical Exam:  /78  Pulse (!) 46  Temp 98.3 °F (36.8 °C) (Oral)   Resp 16  Ht 67\" (170.2 cm)  Wt 102 lb 8 oz (46.5 kg)  SpO2 100%  BMI 16.05 kg/m2  No intake or output data in the 24 hours ending 06/13/17 2050  General - alert, no acute distress  Neurological - strength grossly normal and intact  HEENT - NC, KIZZY, supple  Heart - No murmers  Lungs - Course  Abdomen - NABS, soft, mildly distended, nontender, left inguinal hernia is fully reduced.    Extremity - no edema, cyanosis, clubbing, normal ROM    Labs: CBC and Chemistry    Results from last 7 days  Lab Units 06/13/17  1627 06/12/17  0315   WBC 10*3/mm3 7.65 6.61   HEMOGLOBIN g/dL 12.9* 13.4   HEMATOCRIT % 40.4 41.9   MCV fL 85.1 85.0   PLATELETS 10*3/mm3 238 245   GLUCOSE mg/dL 90 99   CALCIUM mg/dL 9.5 9.6   SODIUM mmol/L 140 139   POTASSIUM mmol/L 4.0 3.8   TOTAL CO2 mmol/L 29.0 29.0   CHLORIDE mmol/L 103 103   BUN mg/dL 16 13   CREATININE mg/dL 0.90 0.90   EGFR IF NONAFRICN AM mL/min/1.73 79 79   BUN / CREAT RATIO  17.8 14.4   ANION GAP mmol/L 8.0 7.0       Assessment / Plan:     Paul Lyons is a 89 y.o. year old male with SBO associated with a left inguinal hernia.  The hernia is reduced at the present time.  He denies nausea and he is passing flatus, suggest that the PSBO is improving.  No NG tube is needed, though he can remain NPO tonight.  The family would like to proceed with surgery.  I can perform surgery 2 days after his last dose of Eliquis.   I " will tentatively schedule him for surgery on Thursday.  This plan was discussed and all questions were answered.  I will speak with the family about benefits and risks of surgery when they are present.  Thank you for allowing me to participate in his care.        Riaz Grey MD  Surgical Oncology

## 2017-06-14 NOTE — PROGRESS NOTES
Hospitalist Daily Progress Note    Date of Admission: 6/13/2017   LOS: 1 day   PCP: Blake Gomez MD    Chief Complaint: Abdominal pain    Subjective    Currently resting in bed in no acute distress.  Tells me that overall he is comfortable.  No fever or chills.  No chest pain or palpitation.  No cough or shortness of breath.  No nausea, vomiting, diarrhea, abdominal pain.    History of Present Illness    Review of Systems  General: no fevers, chills  Respiratory: no cough, dyspnea  Cardiovascular: no chest pain, palpitations  Abdomen: No abdominal pain, nausea, vomiting, or diarrhea  Neurologic: No focal weakness    Objective   Physical Exam:  I have reviewed the vital signs.  Temp:  [97.5 °F (36.4 °C)-98.3 °F (36.8 °C)] 98 °F (36.7 °C)  Heart Rate:  [41-51] 41  Resp:  [16-22] 16  BP: (117-172)/(51-82) 134/51    Objective  General Appearance:    Alert, cooperative, no distress  Head:    Normocephalic, atraumatic  Eyes:    Sclerae anicteric  Neck:   Supple, no mass  Lungs: Clear to auscultation bilaterally, respirations unlabored  Heart: Regular rate and rhythm, S1 and S2 normal, no murmur, rub or gallop  Abdomen:  Soft, non-tender, bowel sounds active, nondistended  Extremities: Cachectic No clubbing, cyanosis, or edema to lower extremities  Skin: No rashes   Neurologic: Awake, alert, follows commands.    Results Review:    I have reviewed the labs, radiology results and diagnostic studies.      Results from last 7 days  Lab Units 06/14/17  0403   WBC 10*3/mm3 5.13   HEMOGLOBIN g/dL 11.7*   PLATELETS 10*3/mm3 221       Results from last 7 days  Lab Units 06/14/17  0403   SODIUM mmol/L 140   POTASSIUM mmol/L 3.7   TOTAL CO2 mmol/L 30.0   CREATININE mg/dL 0.80   GLUCOSE mg/dL 70       I have reviewed the medications.    ---------------------------------------------------------------------------------------------  Assessment/Plan   Assessment & Plan  Assessment/Problem List    Principal Problem:    Incarcerated  left inguinal hernia, reduced.  Currently patient has no symptoms.  Surgery is on board.      Chronic atrial fibrillation was on Eliquis at home.      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  I talked to surgery and most likely patient will have surgery tomorrow.  We will stop the heparin at midnight.  - Continue home medication and monitor vitals.      DVT prophylaxis:  Discharge Planning: I expect patient to be discharged to be determined.    Henrry Lucas MD 06/14/17 12:20 PM

## 2017-06-15 ENCOUNTER — ANESTHESIA EVENT (OUTPATIENT)
Dept: PERIOP | Facility: HOSPITAL | Age: 82
End: 2017-06-15

## 2017-06-15 ENCOUNTER — ANESTHESIA (OUTPATIENT)
Dept: PERIOP | Facility: HOSPITAL | Age: 82
End: 2017-06-15

## 2017-06-15 LAB
BASOPHILS # BLD AUTO: 0.03 10*3/MM3 (ref 0–0.2)
BASOPHILS NFR BLD AUTO: 0.4 % (ref 0–1)
DEPRECATED RDW RBC AUTO: 47.8 FL (ref 37–54)
EOSINOPHIL # BLD AUTO: 0.07 10*3/MM3 (ref 0.1–0.3)
EOSINOPHIL NFR BLD AUTO: 0.8 % (ref 0–3)
ERYTHROCYTE [DISTWIDTH] IN BLOOD BY AUTOMATED COUNT: 14.9 % (ref 11.3–14.5)
GLUCOSE BLDC GLUCOMTR-MCNC: 75 MG/DL (ref 70–130)
GLUCOSE BLDC GLUCOMTR-MCNC: 97 MG/DL (ref 70–130)
HCT VFR BLD AUTO: 37.3 % (ref 38.9–50.9)
HGB BLD-MCNC: 11.8 G/DL (ref 13.1–17.5)
IMM GRANULOCYTES # BLD: 0.01 10*3/MM3 (ref 0–0.03)
IMM GRANULOCYTES NFR BLD: 0.1 % (ref 0–0.6)
LYMPHOCYTES # BLD AUTO: 0.78 10*3/MM3 (ref 0.6–4.8)
LYMPHOCYTES NFR BLD AUTO: 9.3 % (ref 24–44)
MCH RBC QN AUTO: 27.5 PG (ref 27–31)
MCHC RBC AUTO-ENTMCNC: 31.6 G/DL (ref 32–36)
MCV RBC AUTO: 86.9 FL (ref 80–99)
MONOCYTES # BLD AUTO: 0.74 10*3/MM3 (ref 0–1)
MONOCYTES NFR BLD AUTO: 8.8 % (ref 0–12)
NEUTROPHILS # BLD AUTO: 6.77 10*3/MM3 (ref 1.5–8.3)
NEUTROPHILS NFR BLD AUTO: 80.6 % (ref 41–71)
PLATELET # BLD AUTO: 196 10*3/MM3 (ref 150–450)
PMV BLD AUTO: 9.8 FL (ref 6–12)
RBC # BLD AUTO: 4.29 10*6/MM3 (ref 4.2–5.76)
WBC NRBC COR # BLD: 8.4 10*3/MM3 (ref 3.5–10.8)

## 2017-06-15 PROCEDURE — 85025 COMPLETE CBC W/AUTO DIFF WBC: CPT | Performed by: INTERNAL MEDICINE

## 2017-06-15 PROCEDURE — 25010000002 PROPOFOL 10 MG/ML EMULSION: Performed by: NURSE ANESTHETIST, CERTIFIED REGISTERED

## 2017-06-15 PROCEDURE — 25010000003 CEFAZOLIN PER 500 MG: Performed by: NURSE ANESTHETIST, CERTIFIED REGISTERED

## 2017-06-15 PROCEDURE — C1781 MESH (IMPLANTABLE): HCPCS | Performed by: SURGERY

## 2017-06-15 PROCEDURE — 25010000002 LEVETIRACETAM IN NACL 0.82% 500 MG/100ML SOLUTION: Performed by: HOSPITALIST

## 2017-06-15 PROCEDURE — 93005 ELECTROCARDIOGRAM TRACING: CPT | Performed by: ANESTHESIOLOGY

## 2017-06-15 PROCEDURE — 88302 TISSUE EXAM BY PATHOLOGIST: CPT | Performed by: SURGERY

## 2017-06-15 PROCEDURE — 25010000002 PHENYLEPHRINE PER 1 ML: Performed by: NURSE ANESTHETIST, CERTIFIED REGISTERED

## 2017-06-15 PROCEDURE — 82962 GLUCOSE BLOOD TEST: CPT

## 2017-06-15 PROCEDURE — 25010000002 BUPRENORPHINE PER 0.1 MG: Performed by: NURSE ANESTHETIST, CERTIFIED REGISTERED

## 2017-06-15 PROCEDURE — 25010000002 ONDANSETRON PER 1 MG: Performed by: HOSPITALIST

## 2017-06-15 PROCEDURE — 99232 SBSQ HOSP IP/OBS MODERATE 35: CPT | Performed by: INTERNAL MEDICINE

## 2017-06-15 PROCEDURE — 25010000002 DEXAMETHASONE PER 1 MG: Performed by: NURSE ANESTHETIST, CERTIFIED REGISTERED

## 2017-06-15 PROCEDURE — 25010000002 DEXAMETHASONE SODIUM PHOSPHATE 10 MG/ML SOLUTION 1 ML VIAL: Performed by: NURSE ANESTHETIST, CERTIFIED REGISTERED

## 2017-06-15 PROCEDURE — 0YU80JZ SUPPLEMENT LEFT FEMORAL REGION WITH SYNTHETIC SUBSTITUTE, OPEN APPROACH: ICD-10-PCS | Performed by: SURGERY

## 2017-06-15 DEVICE — MESH PROLN 3X6: Type: IMPLANTABLE DEVICE | Status: FUNCTIONAL

## 2017-06-15 RX ORDER — DEXAMETHASONE SODIUM PHOSPHATE 10 MG/ML
INJECTION INTRAMUSCULAR; INTRAVENOUS AS NEEDED
Status: DISCONTINUED | OUTPATIENT
Start: 2017-06-15 | End: 2017-06-15 | Stop reason: SURG

## 2017-06-15 RX ORDER — FENTANYL CITRATE 50 UG/ML
50 INJECTION, SOLUTION INTRAMUSCULAR; INTRAVENOUS
Status: DISCONTINUED | OUTPATIENT
Start: 2017-06-15 | End: 2017-06-15 | Stop reason: HOSPADM

## 2017-06-15 RX ORDER — LIDOCAINE HYDROCHLORIDE 10 MG/ML
0.5 INJECTION, SOLUTION EPIDURAL; INFILTRATION; INTRACAUDAL; PERINEURAL ONCE AS NEEDED
Status: DISCONTINUED | OUTPATIENT
Start: 2017-06-15 | End: 2017-06-15 | Stop reason: HOSPADM

## 2017-06-15 RX ORDER — PROMETHAZINE HYDROCHLORIDE 25 MG/1
25 TABLET ORAL ONCE AS NEEDED
Status: DISCONTINUED | OUTPATIENT
Start: 2017-06-15 | End: 2017-06-15 | Stop reason: HOSPADM

## 2017-06-15 RX ORDER — GLYCOPYRROLATE 0.2 MG/ML
INJECTION INTRAMUSCULAR; INTRAVENOUS AS NEEDED
Status: DISCONTINUED | OUTPATIENT
Start: 2017-06-15 | End: 2017-06-15 | Stop reason: SURG

## 2017-06-15 RX ORDER — PROMETHAZINE HYDROCHLORIDE 25 MG/ML
6.25 INJECTION, SOLUTION INTRAMUSCULAR; INTRAVENOUS ONCE AS NEEDED
Status: DISCONTINUED | OUTPATIENT
Start: 2017-06-15 | End: 2017-06-15 | Stop reason: HOSPADM

## 2017-06-15 RX ORDER — PROPOFOL 10 MG/ML
VIAL (ML) INTRAVENOUS AS NEEDED
Status: DISCONTINUED | OUTPATIENT
Start: 2017-06-15 | End: 2017-06-15 | Stop reason: SURG

## 2017-06-15 RX ORDER — PROMETHAZINE HYDROCHLORIDE 25 MG/1
25 SUPPOSITORY RECTAL ONCE AS NEEDED
Status: DISCONTINUED | OUTPATIENT
Start: 2017-06-15 | End: 2017-06-15 | Stop reason: HOSPADM

## 2017-06-15 RX ORDER — MAGNESIUM HYDROXIDE 1200 MG/15ML
LIQUID ORAL AS NEEDED
Status: DISCONTINUED | OUTPATIENT
Start: 2017-06-15 | End: 2017-06-15 | Stop reason: HOSPADM

## 2017-06-15 RX ORDER — SODIUM CHLORIDE 0.9 % (FLUSH) 0.9 %
1-10 SYRINGE (ML) INJECTION AS NEEDED
Status: DISCONTINUED | OUTPATIENT
Start: 2017-06-15 | End: 2017-06-15 | Stop reason: HOSPADM

## 2017-06-15 RX ORDER — MORPHINE SULFATE 4 MG/ML
4 INJECTION, SOLUTION INTRAMUSCULAR; INTRAVENOUS
Status: DISCONTINUED | OUTPATIENT
Start: 2017-06-15 | End: 2017-06-21 | Stop reason: HOSPADM

## 2017-06-15 RX ORDER — FAMOTIDINE 10 MG/ML
20 INJECTION, SOLUTION INTRAVENOUS ONCE
Status: CANCELLED | OUTPATIENT
Start: 2017-06-15 | End: 2017-06-15

## 2017-06-15 RX ORDER — DOCUSATE SODIUM 100 MG/1
100 CAPSULE, LIQUID FILLED ORAL 2 TIMES DAILY PRN
Status: DISCONTINUED | OUTPATIENT
Start: 2017-06-15 | End: 2017-06-21 | Stop reason: HOSPADM

## 2017-06-15 RX ORDER — NALOXONE HCL 0.4 MG/ML
0.4 VIAL (ML) INJECTION
Status: DISCONTINUED | OUTPATIENT
Start: 2017-06-15 | End: 2017-06-21 | Stop reason: HOSPADM

## 2017-06-15 RX ORDER — FAMOTIDINE 20 MG/1
20 TABLET, FILM COATED ORAL ONCE
Status: COMPLETED | OUTPATIENT
Start: 2017-06-15 | End: 2017-06-15

## 2017-06-15 RX ORDER — SODIUM CHLORIDE, SODIUM LACTATE, POTASSIUM CHLORIDE, CALCIUM CHLORIDE 600; 310; 30; 20 MG/100ML; MG/100ML; MG/100ML; MG/100ML
9 INJECTION, SOLUTION INTRAVENOUS CONTINUOUS
Status: DISCONTINUED | OUTPATIENT
Start: 2017-06-15 | End: 2017-06-19

## 2017-06-15 RX ORDER — OXYCODONE HYDROCHLORIDE AND ACETAMINOPHEN 5; 325 MG/1; MG/1
1 TABLET ORAL EVERY 4 HOURS PRN
Status: DISCONTINUED | OUTPATIENT
Start: 2017-06-15 | End: 2017-06-21 | Stop reason: HOSPADM

## 2017-06-15 RX ORDER — LIDOCAINE HYDROCHLORIDE 10 MG/ML
INJECTION, SOLUTION INFILTRATION; PERINEURAL AS NEEDED
Status: DISCONTINUED | OUTPATIENT
Start: 2017-06-15 | End: 2017-06-15 | Stop reason: SURG

## 2017-06-15 RX ADMIN — DORZOLAMIDE HYDROCHLORIDE 1 DROP: 20 SOLUTION/ DROPS OPHTHALMIC at 08:00

## 2017-06-15 RX ADMIN — EPHEDRINE SULFATE 5 MG: 50 INJECTION INTRAMUSCULAR; INTRAVENOUS; SUBCUTANEOUS at 11:49

## 2017-06-15 RX ADMIN — PHENYLEPHRINE HYDROCHLORIDE 50 MCG: 10 INJECTION INTRAVENOUS at 13:11

## 2017-06-15 RX ADMIN — LATANOPROST 1 DROP: 50 SOLUTION/ DROPS OPHTHALMIC at 20:34

## 2017-06-15 RX ADMIN — PHENYLEPHRINE HYDROCHLORIDE 50 MCG: 10 INJECTION INTRAVENOUS at 12:32

## 2017-06-15 RX ADMIN — CEFAZOLIN SODIUM 1 G: 1 INJECTION, SOLUTION INTRAVENOUS at 11:05

## 2017-06-15 RX ADMIN — DEXAMETHASONE SODIUM PHOSPHATE 17 ML: 10 INJECTION, SOLUTION INTRAMUSCULAR; INTRAVENOUS at 11:03

## 2017-06-15 RX ADMIN — SODIUM CHLORIDE 75 ML/HR: 9 INJECTION, SOLUTION INTRAVENOUS at 14:37

## 2017-06-15 RX ADMIN — EPHEDRINE SULFATE 5 MG: 50 INJECTION INTRAMUSCULAR; INTRAVENOUS; SUBCUTANEOUS at 11:26

## 2017-06-15 RX ADMIN — GLYCOPYRROLATE 0.1 MG: 0.2 INJECTION, SOLUTION INTRAMUSCULAR; INTRAVENOUS at 11:05

## 2017-06-15 RX ADMIN — ONDANSETRON 4 MG: 2 INJECTION INTRAMUSCULAR; INTRAVENOUS at 13:20

## 2017-06-15 RX ADMIN — PHENYLEPHRINE HYDROCHLORIDE 50 MCG: 10 INJECTION INTRAVENOUS at 12:07

## 2017-06-15 RX ADMIN — LEVETIRACETAM 500 MG: 5 INJECTION INTRAVENOUS at 20:34

## 2017-06-15 RX ADMIN — EPHEDRINE SULFATE 5 MG: 50 INJECTION INTRAMUSCULAR; INTRAVENOUS; SUBCUTANEOUS at 11:03

## 2017-06-15 RX ADMIN — PROPOFOL 80 MG: 10 INJECTION, EMULSION INTRAVENOUS at 10:54

## 2017-06-15 RX ADMIN — DORZOLAMIDE HYDROCHLORIDE 1 DROP: 20 SOLUTION/ DROPS OPHTHALMIC at 17:04

## 2017-06-15 RX ADMIN — PHENYLEPHRINE HYDROCHLORIDE 50 MCG: 10 INJECTION INTRAVENOUS at 12:45

## 2017-06-15 RX ADMIN — PHENYLEPHRINE HYDROCHLORIDE 50 MCG: 10 INJECTION INTRAVENOUS at 11:26

## 2017-06-15 RX ADMIN — FAMOTIDINE 20 MG: 20 TABLET, FILM COATED ORAL at 09:45

## 2017-06-15 RX ADMIN — LEVETIRACETAM 500 MG: 5 INJECTION INTRAVENOUS at 07:59

## 2017-06-15 RX ADMIN — GLYCOPYRROLATE 0.1 MG: 0.2 INJECTION, SOLUTION INTRAMUSCULAR; INTRAVENOUS at 11:10

## 2017-06-15 RX ADMIN — LIDOCAINE HYDROCHLORIDE 50 MG: 10 INJECTION, SOLUTION INFILTRATION; PERINEURAL at 10:54

## 2017-06-15 RX ADMIN — TIMOLOL MALEATE 1 DROP: 2.5 SOLUTION/ DROPS OPHTHALMIC at 08:00

## 2017-06-15 RX ADMIN — DEXAMETHASONE SODIUM PHOSPHATE 8 MG: 10 INJECTION INTRAMUSCULAR; INTRAVENOUS at 11:05

## 2017-06-15 RX ADMIN — SODIUM CHLORIDE, POTASSIUM CHLORIDE, SODIUM LACTATE AND CALCIUM CHLORIDE 9 ML/HR: 600; 310; 30; 20 INJECTION, SOLUTION INTRAVENOUS at 09:30

## 2017-06-15 NOTE — ANESTHESIA PROCEDURE NOTES
Peripheral Block    Patient location during procedure: OR  Reason for block: at surgeon's request and post-op pain management  Performed by  Anesthesiologist: SAMIR GIFFORD  Assisted by: MARLENY SEARS  Preanesthetic Checklist  Completed: patient identified, site marked, surgical consent, pre-op evaluation, timeout performed, IV checked, risks and benefits discussed and monitors and equipment checked  Peripheral Block Prep:  Sterile barriers:cap, gloves, sterile barriers and mask  Prep: ChloraPrep  Patient monitoring: blood pressure monitoring, continuous pulse oximetry and EKG  Peripheral Procedure  Guidance:ultrasound guided  Images:still images obtained    Laterality:Bilateral  Block Type:TAP  Injection Technique:single-shot  Needle Type:short-bevel  Needle Gauge:20 G    Medications  Comment:Block Injection:  LA dose divided between Right and Left block       Adjuncts:  Decadron 4mg PSF, Buprenex 0.3mg (Per total volume of LA) (diluted in a total of 30mL)  Local Injected:bupivacaine 0.25% Local Amount Injected:17mL  Post Assessment  Injection Assessment: negative aspiration for heme, incremental injection and no paresthesia on injection  Patient Tolerance:comfortable throughout block  Complications:no  Additional Notes  The pt was placed in the Supine Position and was  anesthetized with:       GA    Under Ultrasound guidance, a BBraun 4inch 360 degree needle was advanced with Normal Saline hydro dissection of tissue.  The Internal Oblique and Transversus Abdominus muscles where visualized.  At or before the aponeurosis of Internal Oblique, local anesthetic spread was visualized in the Transversus Abdominus Plane. Injection was made incrementally with aspiration every 5 mls.  There was no  intravascular injection,  injection pressure was normal, there was no neural injection, and the procedure was completed without difficulty.  Thank You.

## 2017-06-15 NOTE — BRIEF OP NOTE
INGUINAL HERNIA REPAIR  Procedure Note    Paul Lyons  6/13/2017 - 6/15/2017    Pre-op Diagnosis:   Left inguinal hernia    Post-op Diagnosis:     Femoral hernia    Procedure/CPT® Codes:      Procedure(s):  LEFT femoral HERNIA REPAIR with mesh    Surgeon(s):  Riaz Grey MD    Anesthesia: General    Staff:   Circulator: Joyce Lucio RN; Vicki Ferreira RN  Scrub Person: Aleah Soriano; Kayley Schaefer  Nursing Assistant: Mela Kelly    Estimated Blood Loss: 20  Urine Voided:  Specimens:                  ID Type Source Tests Collected by Time Destination   A :  Tissue Hernia, Sac TISSUE EXAM Riaz Grey MD 6/15/2017 1206          Drains:           Findings: very small femoral hernia, no inguinal hernia. Completely intact prior inguinal hernia repair with mesh    Complications: none      Riaz Grey MD     Date: 6/15/2017  Time: 1:23 PM

## 2017-06-15 NOTE — PLAN OF CARE
Problem: Patient Care Overview (Adult)  Goal: Plan of Care Review  Outcome: Ongoing (interventions implemented as appropriate)    06/15/17 1518   Coping/Psychosocial Response Interventions   Plan Of Care Reviewed With patient;spouse   Patient Care Overview   Progress progress toward functional goals as expected       Goal: Adult Individualization and Mutuality  Outcome: Ongoing (interventions implemented as appropriate)    06/13/17 1855   Individualization   Patient Specific Goals increase weight, be able to eat   Patient Specific Interventions bed alarm, waffle mattress       Goal: Discharge Needs Assessment  Outcome: Ongoing (interventions implemented as appropriate)    06/13/17 1426 06/13/17 1800 06/13/17 1855   Discharge Needs Assessment   Concerns To Be Addressed --  --  discharge planning concerns   Readmission Within The Last 30 Days --  --  previous discharge plan unsuccessful   Discharge Disposition --  --  still a patient   Living Environment   Transportation Available --  family or friend will provide --    Self-Care   Equipment Currently Used at Home none --  --          Problem: Fall Risk (Adult)  Goal: Identify Related Risk Factors and Signs and Symptoms  Outcome: Ongoing (interventions implemented as appropriate)    06/13/17 1855   Fall Risk   Fall Risk: Related Risk Factors age-related changes;fatigue/slow reaction;history of falls;gait/mobility problems;fear of falling;environment unfamiliar   Fall Risk: Signs and Symptoms presence of risk factors       Goal: Absence of Falls  Outcome: Ongoing (interventions implemented as appropriate)    06/15/17 1518   Fall Risk (Adult)   Absence of Falls making progress toward outcome         Problem: Pressure Ulcer Risk (Rosalino Scale) (Adult,Obstetrics,Pediatric)  Goal: Identify Related Risk Factors and Signs and Symptoms  Outcome: Ongoing (interventions implemented as appropriate)    06/13/17 1855   Pressure Ulcer Risk (Rosalino Scale)   Related Risk Factors  (Pressure Ulcer Risk (Rosalino Scale)) age extremes;body weight extremes;cognitive impairment;fluid intake inadequate;mechanical forces;mobility impaired       Goal: Skin Integrity  Outcome: Ongoing (interventions implemented as appropriate)    06/15/17 1518   Pressure Ulcer Risk (Rosalino Scale) (Adult,Obstetrics,Pediatric)   Skin Integrity making progress toward outcome         Problem: Nutrition, Imbalanced: Inadequate Oral Intake (Adult)  Goal: Identify Related Risk Factors and Signs and Symptoms  Outcome: Ongoing (interventions implemented as appropriate)    06/13/17 1855 06/14/17 0450   Nutrition, Imbalanced: Inadequate Oral Intake   Nutrition Imbalanced: Less than Body Requirements: Related Risk Factors appetite decreased;knowledge deficit --    Signs and Symptoms (Nutrition Imbalance, Inadequate Oral Intake: Signs and Symptoms) --  loss of subcutaneous fat;wound healing poor       Goal: Improved Oral Intake  Outcome: Ongoing (interventions implemented as appropriate)    06/15/17 1518   Nutrition, Imbalanced: Inadequate Oral Intake (Adult)   Improved Oral Intake making progress toward outcome       Goal: Prevent Further Weight Loss  Outcome: Ongoing (interventions implemented as appropriate)    06/15/17 1518   Nutrition, Imbalanced: Inadequate Oral Intake (Adult)   Prevent Further Weight Loss making progress toward outcome         Problem: Skin Integrity Impairment, Risk/Actual (Adult)  Goal: Identify Related Risk Factors and Signs and Symptoms  Outcome: Ongoing (interventions implemented as appropriate)    06/14/17 1050   Skin Integrity Impairment, Risk/Actual   Skin Integrity Impairment, Risk/Actual: Related Risk Factors age extremes;cognitive impairment;fluid/nutrition status;immobility;infection/disease process;traumatic injury       Goal: Skin Integrity/Wound Healing  Outcome: Ongoing (interventions implemented as appropriate)    06/15/17 1518   Skin Integrity Impairment, Risk/Actual (Adult)   Skin  Integrity/Wound Healing making progress toward outcome         Problem: Perioperative Period (Adult)  Goal: Signs and Symptoms of Listed Potential Problems Will be Absent or Manageable (Perioperative Period)  Outcome: Ongoing (interventions implemented as appropriate)    06/15/17 1518   Perioperative Period   Problems Assessed (Perioperative Period) all   Problems Present (Perioperative Period) pain

## 2017-06-15 NOTE — THERAPY DISCHARGE NOTE
Acute Care - Occupational Therapy Discharge Summary  [facility]     Patient Name: Paul Lyons  : 1927  MRN: [id number]    Today's Date: 6/15/2017  Onset of Illness/Injury or Date of Surgery Date: 17    Date of Referral to OT: 17  Referring Physician: JORDIN Mcdermott      Admit Date: 2017        OT Recommendation and Plan    Visit Dx:    ICD-10-CM ICD-9-CM   1. Impaired mobility and ADLs Z74.09 799.89                     OT Goals       17 1100          Transfer Training OT LTG    Transfer Training OT LTG, Time to Achieve 5 days  -ST      Transfer Training OT LTG, Activity Type toilet;tub  -ST      Transfer Training OT LTG, [place] Level supervision required  -ST      Transfer Training OT LTG, Outcome goal ongoing  -ST      Strength OT LTG    Strength Goal OT LTG, Time to Achieve 5 days  -ST      Strength Goal OT LTG, Functional Goal Pt to be min A with BUE strengthening/ROM HEP to promote independence and engagement in daily tasks by d/c.   -ST      Strength Goal OT LTG, Outcome goal ongoing  -ST      Dynamic Standing Balance OT LTG    Dynamic Standing Balance OT LTG, Time to Achieve 5 days  -ST      Dynamic Standing Balance OT LTG, [place] Level supervision required  -ST      Dynamic Standing Balance OT LTG, Assist Device UE Support  -ST      Dynamic Standing Balance OT LTG, Additional Goal X8 mins with no LOB during fxnl task   -ST      Dynamic Standing Balance OT LTG, Outcome goal ongoing  -ST        User Key  (r) = Recorded By, (t) = Taken By, (c) = Cosigned By    Initials Name Provider Type     Alisa Booth OTR Occupational Therapist                  OT Discharge Summary  Reason for Discharge: Discharge from facility  Outcomes Achieved: Refer to plan of care for updates on goals achieved  Discharge Destination: Home      Jaki Pace OT  6/15/2017

## 2017-06-15 NOTE — PLAN OF CARE
Problem: Fall Risk (Adult)  Goal: Absence of Falls  Outcome: Ongoing (interventions implemented as appropriate)    06/15/17 0510   Fall Risk (Adult)   Absence of Falls making progress toward outcome         Problem: Pressure Ulcer Risk (Rosalino Scale) (Adult,Obstetrics,Pediatric)  Intervention: Prevent/Manage Excess Moisture    06/15/17 0510   Skin Interventions   Skin Protection incontinence pads utilized       Intervention: Turn/Reposition Often    06/15/17 0510   Skin Interventions   Pressure Reduction Techniques frequent weight shift encouraged;weight shift assistance provided   Positioning   Positioning semi-Fowlers         Goal: Skin Integrity  Outcome: Ongoing (interventions implemented as appropriate)    Problem: Skin Integrity Impairment, Risk/Actual (Adult)  Goal: Identify Related Risk Factors and Signs and Symptoms  Outcome: Ongoing (interventions implemented as appropriate)    06/14/17 1050   Skin Integrity Impairment, Risk/Actual   Skin Integrity Impairment, Risk/Actual: Related Risk Factors age extremes;cognitive impairment;fluid/nutrition status;immobility;infection/disease process;traumatic injury       Goal: Skin Integrity/Wound Healing  Outcome: Ongoing (interventions implemented as appropriate)    06/15/17 0510   Skin Integrity Impairment, Risk/Actual (Adult)   Skin Integrity/Wound Healing making progress toward outcome

## 2017-06-15 NOTE — ANESTHESIA PROCEDURE NOTES
Airway  Urgency: elective    Airway not difficult    General Information and Staff    Patient location during procedure: OR  CRNA: SHANNON LUO    Indications and Patient Condition  Indications for airway management: airway protection    Preoxygenated: yes  Mask difficulty assessment: 1 - vent by mask    Final Airway Details  Final airway type: supraglottic airway      Successful airway: classic  Size 4    Number of attempts at approach: 1    Additional Comments  LMA placed without difficulty, ventilation with assist, equal breath sounds and symmetric chest rise and fall

## 2017-06-15 NOTE — ANESTHESIA POSTPROCEDURE EVALUATION
"Patient: Paul Lyons    Procedure Summary     Date Anesthesia Start Anesthesia Stop Room / Location    06/15/17 1048  [facility] / [facility]       Procedure Diagnosis Surgeon Provider    LEFT INGUINAL HERNIA REPAIR (Left Groin) No diagnosis on file. MD Javier De La Fuente MD          Anesthesia Type: general  Last vitals  BP      Temp      Pulse     Resp      SpO2        Post Anesthesia Care and Evaluation    Patient location during evaluation: PACU  Patient participation: waiting for patient participation  Level of consciousness: awake and alert and responsive to physical stimuli  Pain score: 0  Pain management: adequate  Airway patency: patent  Anesthetic complications: No anesthetic complications  PONV Status: none  Cardiovascular status: hemodynamically stable and acceptable  Respiratory status: nonlabored ventilation, acceptable, nasal cannula and oral airway  Hydration status: acceptable    Comments: /54BP Location: Left arm, Patient Position: Lying)  Pulse 61  Temp 97.6 °F (36.7 °C) (Temporal Artery )   Resp 16  Ht 67\" (170.2 cm)  Wt 102 lb (46.3 kg)  SpO2 97%  BMI 15.98 kg/m2        "

## 2017-06-15 NOTE — PROGRESS NOTES
Hospitalist Daily Progress Note    Date of Admission: 6/13/2017   LOS: 2 days   PCP: Blake Gomez MD    Chief Complaint: Abdominal pain    Subjective      Had surgery today without complication.No fever or chills.  No chest pain or palpitation.  No cough or shortness of breath.  No nausea, vomiting, diarrhea, abdominal pain.    History of Present Illness    Review of Systems  General: no fevers, chills  Respiratory: no cough, dyspnea  Cardiovascular: no chest pain, palpitations  Abdomen: No abdominal pain, nausea, vomiting, or diarrhea  Neurologic: No focal weakness    Objective   Physical Exam:  I have reviewed the vital signs.  Temp:  [97.7 °F (36.5 °C)-98.3 °F (36.8 °C)] 97.8 °F (36.6 °C)  Heart Rate:  [46-61] 58  Resp:  [14-16] 16  BP: (112-149)/(48-78) 147/65    Objective  General Appearance:    Alert, cooperative, no distress  Head:    Normocephalic, atraumatic  Eyes:    Sclerae anicteric  Neck:   Supple, no mass  Lungs: Clear to auscultation bilaterally, respirations unlabored  Heart: Regular rate and rhythm, S1 and S2 normal, no murmur, rub or gallop  Abdomen:  Soft, non-tender, bowel sounds active, nondistended  Extremities: Cachectic No clubbing, cyanosis, or edema to lower extremities  Skin: No rashes   Neurologic: Awake, alert, follows commands.    Results Review:    I have reviewed the labs, radiology results and diagnostic studies.      Results from last 7 days  Lab Units 06/15/17  0610   WBC 10*3/mm3 8.40   HEMOGLOBIN g/dL 11.8*   PLATELETS 10*3/mm3 196       Results from last 7 days  Lab Units 06/14/17  0403   SODIUM mmol/L 140   POTASSIUM mmol/L 3.7   TOTAL CO2 mmol/L 30.0   CREATININE mg/dL 0.80   GLUCOSE mg/dL 70       I have reviewed the medications.    ---------------------------------------------------------------------------------------------  Assessment/Plan   Assessment & Plan  Assessment/Problem List    Principal Problem:    Incarcerated left inguinal hernia, reduced, s/p surgery, no  complication.      Chronic atrial fibrillation was on Eliquis at home.      Essential hypertension      Seizure disorder      Late onset Alzheimer's disease without behavioral disturbance       Plan  - Continue current care.  - Labs in a.m.      DVT prophylaxis:  Discharge Planning: I expect patient to be discharged to be determined.    Henrry Lucas MD 06/15/17 4:50 PM

## 2017-06-16 LAB
ALBUMIN SERPL-MCNC: 3.4 G/DL (ref 3.2–4.8)
ALBUMIN/GLOB SERPL: 1.5 G/DL (ref 1.5–2.5)
ALP SERPL-CCNC: 71 U/L (ref 25–100)
ALT SERPL W P-5'-P-CCNC: 8 U/L (ref 7–40)
ANION GAP SERPL CALCULATED.3IONS-SCNC: 7 MMOL/L (ref 3–11)
AST SERPL-CCNC: 15 U/L (ref 0–33)
BASOPHILS # BLD AUTO: 0.04 10*3/MM3 (ref 0–0.2)
BASOPHILS NFR BLD AUTO: 0.6 % (ref 0–1)
BILIRUB SERPL-MCNC: 0.6 MG/DL (ref 0.3–1.2)
BUN BLD-MCNC: 12 MG/DL (ref 9–23)
BUN/CREAT SERPL: 15 (ref 7–25)
CALCIUM SPEC-SCNC: 8.9 MG/DL (ref 8.7–10.4)
CHLORIDE SERPL-SCNC: 103 MMOL/L (ref 99–109)
CO2 SERPL-SCNC: 27 MMOL/L (ref 20–31)
CREAT BLD-MCNC: 0.8 MG/DL (ref 0.6–1.3)
CYTO UR: NORMAL
DEPRECATED RDW RBC AUTO: 46.1 FL (ref 37–54)
EOSINOPHIL # BLD AUTO: 0.03 10*3/MM3 (ref 0.1–0.3)
EOSINOPHIL NFR BLD AUTO: 0.5 % (ref 0–3)
ERYTHROCYTE [DISTWIDTH] IN BLOOD BY AUTOMATED COUNT: 14.8 % (ref 11.3–14.5)
GFR SERPL CREATININE-BSD FRML MDRD: 91 ML/MIN/1.73
GLOBULIN UR ELPH-MCNC: 2.2 GM/DL
GLUCOSE BLD-MCNC: 84 MG/DL (ref 70–100)
HCT VFR BLD AUTO: 39.1 % (ref 38.9–50.9)
HGB BLD-MCNC: 12.6 G/DL (ref 13.1–17.5)
IMM GRANULOCYTES # BLD: 0.02 10*3/MM3 (ref 0–0.03)
IMM GRANULOCYTES NFR BLD: 0.3 % (ref 0–0.6)
LAB AP CASE REPORT: NORMAL
LAB AP CLINICAL INFORMATION: NORMAL
LYMPHOCYTES # BLD AUTO: 0.99 10*3/MM3 (ref 0.6–4.8)
LYMPHOCYTES NFR BLD AUTO: 15.5 % (ref 24–44)
Lab: NORMAL
MAGNESIUM SERPL-MCNC: 1.8 MG/DL (ref 1.3–2.7)
MCH RBC QN AUTO: 27.5 PG (ref 27–31)
MCHC RBC AUTO-ENTMCNC: 32.2 G/DL (ref 32–36)
MCV RBC AUTO: 85.2 FL (ref 80–99)
MONOCYTES # BLD AUTO: 0.89 10*3/MM3 (ref 0–1)
MONOCYTES NFR BLD AUTO: 14 % (ref 0–12)
NEUTROPHILS # BLD AUTO: 4.4 10*3/MM3 (ref 1.5–8.3)
NEUTROPHILS NFR BLD AUTO: 69.1 % (ref 41–71)
PATH REPORT.FINAL DX SPEC: NORMAL
PATH REPORT.GROSS SPEC: NORMAL
PHOSPHATE SERPL-MCNC: 3.4 MG/DL (ref 2.4–5.1)
PLATELET # BLD AUTO: 230 10*3/MM3 (ref 150–450)
PMV BLD AUTO: 9.6 FL (ref 6–12)
POTASSIUM BLD-SCNC: 3.5 MMOL/L (ref 3.5–5.5)
PROT SERPL-MCNC: 5.6 G/DL (ref 5.7–8.2)
RBC # BLD AUTO: 4.59 10*6/MM3 (ref 4.2–5.76)
SODIUM BLD-SCNC: 137 MMOL/L (ref 132–146)
WBC NRBC COR # BLD: 6.37 10*3/MM3 (ref 3.5–10.8)

## 2017-06-16 PROCEDURE — 83735 ASSAY OF MAGNESIUM: CPT | Performed by: INTERNAL MEDICINE

## 2017-06-16 PROCEDURE — 25010000002 LORAZEPAM PER 2 MG: Performed by: HOSPITALIST

## 2017-06-16 PROCEDURE — 25010000002 MORPHINE PER 10 MG: Performed by: SURGERY

## 2017-06-16 PROCEDURE — 80053 COMPREHEN METABOLIC PANEL: CPT | Performed by: INTERNAL MEDICINE

## 2017-06-16 PROCEDURE — 99232 SBSQ HOSP IP/OBS MODERATE 35: CPT | Performed by: INTERNAL MEDICINE

## 2017-06-16 PROCEDURE — 84100 ASSAY OF PHOSPHORUS: CPT | Performed by: INTERNAL MEDICINE

## 2017-06-16 PROCEDURE — 25010000002 LEVETIRACETAM IN NACL 0.82% 500 MG/100ML SOLUTION: Performed by: HOSPITALIST

## 2017-06-16 PROCEDURE — 85025 COMPLETE CBC W/AUTO DIFF WBC: CPT | Performed by: INTERNAL MEDICINE

## 2017-06-16 RX ORDER — POTASSIUM CHLORIDE 7.45 MG/ML
10 INJECTION INTRAVENOUS
Status: DISCONTINUED | OUTPATIENT
Start: 2017-06-16 | End: 2017-06-21 | Stop reason: HOSPADM

## 2017-06-16 RX ORDER — MAGNESIUM SULFATE HEPTAHYDRATE 40 MG/ML
2 INJECTION, SOLUTION INTRAVENOUS AS NEEDED
Status: DISCONTINUED | OUTPATIENT
Start: 2017-06-16 | End: 2017-06-21 | Stop reason: HOSPADM

## 2017-06-16 RX ORDER — MAGNESIUM SULFATE HEPTAHYDRATE 40 MG/ML
4 INJECTION, SOLUTION INTRAVENOUS AS NEEDED
Status: DISCONTINUED | OUTPATIENT
Start: 2017-06-16 | End: 2017-06-21 | Stop reason: HOSPADM

## 2017-06-16 RX ORDER — POTASSIUM CHLORIDE 750 MG/1
40 CAPSULE, EXTENDED RELEASE ORAL AS NEEDED
Status: DISCONTINUED | OUTPATIENT
Start: 2017-06-16 | End: 2017-06-21 | Stop reason: HOSPADM

## 2017-06-16 RX ORDER — MEMANTINE HYDROCHLORIDE 10 MG/1
5 TABLET ORAL EVERY 12 HOURS SCHEDULED
Status: DISCONTINUED | OUTPATIENT
Start: 2017-06-16 | End: 2017-06-21 | Stop reason: HOSPADM

## 2017-06-16 RX ORDER — POTASSIUM CHLORIDE 1.5 G/1.77G
40 POWDER, FOR SOLUTION ORAL AS NEEDED
Status: DISCONTINUED | OUTPATIENT
Start: 2017-06-16 | End: 2017-06-16

## 2017-06-16 RX ORDER — QUETIAPINE FUMARATE 25 MG/1
25 TABLET, FILM COATED ORAL EVERY 12 HOURS SCHEDULED
Status: DISCONTINUED | OUTPATIENT
Start: 2017-06-16 | End: 2017-06-21 | Stop reason: HOSPADM

## 2017-06-16 RX ADMIN — LORAZEPAM 0.25 MG: 2 INJECTION, SOLUTION INTRAMUSCULAR; INTRAVENOUS at 22:51

## 2017-06-16 RX ADMIN — DORZOLAMIDE HYDROCHLORIDE 1 DROP: 20 SOLUTION/ DROPS OPHTHALMIC at 18:00

## 2017-06-16 RX ADMIN — MEMANTINE 5 MG: 10 TABLET ORAL at 20:37

## 2017-06-16 RX ADMIN — LEVETIRACETAM 500 MG: 5 INJECTION INTRAVENOUS at 08:21

## 2017-06-16 RX ADMIN — POTASSIUM CHLORIDE 40 MEQ: 750 CAPSULE, EXTENDED RELEASE ORAL at 18:56

## 2017-06-16 RX ADMIN — LATANOPROST 1 DROP: 50 SOLUTION/ DROPS OPHTHALMIC at 20:37

## 2017-06-16 RX ADMIN — POTASSIUM CHLORIDE 40 MEQ: 750 CAPSULE, EXTENDED RELEASE ORAL at 23:02

## 2017-06-16 RX ADMIN — OXYCODONE AND ACETAMINOPHEN 1 TABLET: 5; 325 TABLET ORAL at 20:37

## 2017-06-16 RX ADMIN — LEVETIRACETAM 500 MG: 5 INJECTION INTRAVENOUS at 20:36

## 2017-06-16 RX ADMIN — QUETIAPINE FUMARATE 25 MG: 25 TABLET, FILM COATED ORAL at 20:36

## 2017-06-16 RX ADMIN — MORPHINE SULFATE 4 MG: 4 INJECTION, SOLUTION INTRAMUSCULAR; INTRAVENOUS at 23:57

## 2017-06-16 RX ADMIN — TIMOLOL MALEATE 1 DROP: 2.5 SOLUTION/ DROPS OPHTHALMIC at 08:21

## 2017-06-16 RX ADMIN — LORAZEPAM 0.25 MG: 2 INJECTION, SOLUTION INTRAMUSCULAR; INTRAVENOUS at 06:02

## 2017-06-16 RX ADMIN — DORZOLAMIDE HYDROCHLORIDE 1 DROP: 20 SOLUTION/ DROPS OPHTHALMIC at 08:21

## 2017-06-16 RX ADMIN — SODIUM CHLORIDE 75 ML/HR: 9 INJECTION, SOLUTION INTRAVENOUS at 03:37

## 2017-06-16 NOTE — PLAN OF CARE
Problem: Pressure Ulcer Risk (Rosalino Scale) (Adult,Obstetrics,Pediatric)  Goal: Identify Related Risk Factors and Signs and Symptoms  Outcome: Ongoing (interventions implemented as appropriate)

## 2017-06-16 NOTE — SIGNIFICANT NOTE
"While in the adjacent room I heard a loud thud. I ran to 548 to find my patient on the floor in the left corner of the room with the blanket covering his head. The cord to the Scud pump- that he refused to wear- was entangled in his feet. I got to his side immediately asking him what happened. His response was he [place]\" [place] the railroad.\" Are you hurt is anything hurting you. He replied no. No obvious marks or bruising noted. Tech at bedside. Patient returned to bed, vital signs obtained. BP running what it had been previously. Questioning further is anything hurting you? What did you hit? His responses Nothing, I  Was trying to see those birds, Don't you see them?? Dr. Lucas paged along with House and charge nurse. Patient alert to person is his baseline. Dr. Lucas at the bedside and wife also arrives. Pt still alert to person only. Reports no complaints of pain, Bed alarm and chair alarm have been on and at this time alarm while patient lying still in the bed. Gown changed patient skin reassessed for any bruising. Still no complaints of pain reported. Patient had used urinal right before fall so no need for restroom urgency in relation to fall. Spouse and Dr at the bedside. Will continue to monitor patient for additional effects from fall.   "

## 2017-06-16 NOTE — PROGRESS NOTES
"                            Surgical Oncology Progress Note                                 Admit Date: 6/13/2017  Subjective:     Patient denies nausea, vomiting.   No events overnight.  Pain controlled.      Objective:   /71 (BP Location: Left arm, Patient Position: Lying)  Pulse 55  Temp 97.5 °F (36.4 °C) (Oral)   Resp 16  Ht 67\" (170.2 cm)  Wt 102 lb (46.3 kg)  SpO2 100%  BMI 15.98 kg/m2    Intake/Output Summary (Last 24 hours) at 06/16/17 1027  Last data filed at 06/16/17 0900   Gross per 24 hour   Intake             2193 ml   Output              625 ml   Net             1568 ml     Physical Exam:  Physical Exam  Vitals:    06/16/17 0700   BP: 153/71   Pulse: 55   Resp: 16   Temp: 97.5 °F (36.4 °C)   SpO2: 100%     General: awake, alert, comfortable  Pulm: CTAB  CVS: no M/R/G, reg rate  Abd: soft, NT, ND, NABS, Nonblanching erythema around incision - likely reaction to Ioban used in surgery.  Mild edema around incision. Bandage clean and dry.    Ext: warm, well perfused      Labs: CBC and Chemistry    Results from last 7 days  Lab Units 06/16/17  0535 06/15/17  0610 06/14/17  0403 06/13/17  1627 06/12/17  0315   WBC 10*3/mm3 6.37 8.40 5.13 7.65 6.61   HEMOGLOBIN g/dL 12.6* 11.8* 11.7* 12.9* 13.4   HEMATOCRIT % 39.1 37.3* 36.4* 40.4 41.9   MCV fL 85.2 86.9 85.8 85.1 85.0   PLATELETS 10*3/mm3 230 196 221 238 245   GLUCOSE mg/dL 84  --  70 90 99   CALCIUM mg/dL 8.9  --  9.0 9.5 9.6   SODIUM mmol/L 137  --  140 140 139   POTASSIUM mmol/L 3.5  --  3.7 4.0 3.8   TOTAL CO2 mmol/L 27.0  --  30.0 29.0 29.0   CHLORIDE mmol/L 103  --  104 103 103   BUN mg/dL 12  --  15 16 13   CREATININE mg/dL 0.80  --  0.80 0.90 0.90   EGFR IF NONAFRICN AM mL/min/1.73 91  --  91 79 79   BUN / CREAT RATIO  15.0  --  18.8 17.8 14.4   ANION GAP mmol/L 7.0  --  6.0 8.0 7.0       Assessment / Plan:     Paul Lyons is a 89 y.o. year old patient POD1 s/p repair of left femoral hernia.  Ok to advance diet as tolerated.  Can FU " in my office in 2 weeks.  OK for discharge when medically ready.  OK to restart anticoagulation tomorrow.      Riaz Grey MD  Surgical Oncology

## 2017-06-16 NOTE — PROGRESS NOTES
Continued Stay Note   [facility]     Patient Name: Paul Lyons  MRN: [id number]  Today's Date: 6/16/2017    Admit Date: 6/13/2017          Discharge Plan       06/16/17 1148    Case Management/Social Work Plan    Plan Home at DC    Patient/Family In Agreement With Plan yes    Additional Comments I spoke with the pts spouse. She states the pt is more confused since surgery but this is expected and a pattern.. States he will do better at home. She states no DC needs at this time.              Discharge Codes     None        Expected Discharge Date and Time     Expected Discharge Date Expected Discharge Time    Jun 17, 2017             Elaine Aj RN

## 2017-06-16 NOTE — PROGRESS NOTES
"Adult Nutrition  Assessment/PES    Patient Name:  Paul Lyons  YOB: 1927  MRN: [id number]  Admit Date:  6/13/2017    Assessment Date:  6/16/2017        Reason for Assessment       06/16/17 1556    Reason for Assessment    Reason For Assessment/Visit follow up protocol    Time Spent (min) 20    Diagnosis Diagnosis   Per notes this admission;     Other diagnosis s/p left femoral hernia repair with mesh (6/15/17); cachexia              Nutrition/Diet History       06/16/17 1558    Nutrition/Diet History    Reported/Observed By Patient;Family;RN    Other Per wife pt has not been eating a lot, she observed him eat bites of noodles earlier today, currently is not using supplements believes he would like a chocolate supplement since his diet has advanced. Per wife she monitors his swallowing because he was diagnosed as a silent aspirator. Per RN she was told the same thing about the pt being a silent aspirator, she plans to consult SLP.             Anthropometrics       06/16/17 1603    Anthropometrics    Height 170.2 cm (67\")    Weight 46.3 kg (102 lb)    Ideal Body Weight (IBW)    Ideal Body Weight (IBW), Male (kg) 68.1    % Ideal Body Weight 68.08    Body Mass Index (BMI)    BMI (kg/m2) 16.01            Labs/Tests/Procedures/Meds       06/16/17 1603    Labs/Tests/Procedures/Meds    Labs/Tests Review Reviewed              Nutrition Prescription Ordered       06/16/17 1603    Nutrition Prescription PO    Current PO Diet Regular    Fluid Consistency Thin    Supplement Boost Breeze    Supplement Frequency 3 times a day    Common Modifiers GI Soft/[place]            Evaluation of Received Nutrient/Fluid Intake       06/16/17 1604    PO Evaluation    Number of Meals 3    % PO Intake 25        Problem/Interventions:        Problem 1       06/16/17 1606    Nutrition Diagnoses Problem 1    Problem 1 Inadequate Nutrient Intake    Etiology (related to) Medical Diagnosis    Signs/Symptoms (evidenced by) PO Intake "    Percent (%) intake recorded 25 %    Over number of meals 3              Intervention Goal       06/16/17 1606    Intervention Goal    General Nutrition support treatment    PO Increase intake            Nutrition Intervention       06/16/17 1606    Nutrition Intervention    RD/Tech Action Menu provided;Adjusted supplement;Encourage intake;Follow Tx progress;Care plan reviewd;Other (comment)   Spoke with RN about getting an SLP consult ordered.             Nutrition Prescription       06/16/17 1607    Nutrition Prescription PO    PO Prescription Begin/change supplement    Fluid Consistency Thin    Supplement Boost Plus   Chocolate    Supplement Frequency 2 times a day    New PO Prescription Ordered? Yes            Education/Evaluation       06/16/17 1608    Monitor/Evaluation    Monitor Per protocol;PO intake;Supplement intake        Electronically signed by:  Zaida Pang  06/16/17 4:09 PM

## 2017-06-16 NOTE — PROGRESS NOTES
Hospitalist Daily Progress Note    Date of Admission: 6/13/2017   LOS: 3 days   PCP: Blake Gomez MD    Chief Complaint: Abdominal pain    Subjective    Patient is very confused this morning and area today he was a bit aggressive and was putting the lines out..  No chest pain or palpitation.  No cough or shortness of breath.  No nausea, vomiting, diarrhea, abdominal pain.    History of Present Illness  Unable to obtain secondary to patient's mental status.    Objective   Physical Exam:  I have reviewed the vital signs.  Temp:  [97.5 °F (36.4 °C)-98.2 °F (36.8 °C)] 98.2 °F (36.8 °C)  Heart Rate:  [51-78] 51  Resp:  [16-20] 16  BP: (106-153)/(45-82) 128/55    Objective  General Appearance:    Alert, cooperative, no distress  Head:    Normocephalic, atraumatic  Eyes:    Sclerae anicteric  Neck:   Supple, no mass  Lungs: Clear to auscultation bilaterally, respirations unlabored  Heart: Regular rate and rhythm, S1 and S2 normal, no murmur, rub or gallop  Abdomen:  Soft, non-tender, bowel sounds active, nondistended  Extremities: Cachectic No clubbing, cyanosis, or edema to lower extremities  Skin: No rashes   Neurologic: Awake, alert, very confused but follows simple commands.    Results Review:    I have reviewed the labs, radiology results and diagnostic studies.      Results from last 7 days  Lab Units 06/16/17  0535   WBC 10*3/mm3 6.37   HEMOGLOBIN g/dL 12.6*   PLATELETS 10*3/mm3 230       Results from last 7 days  Lab Units 06/16/17  0535   SODIUM mmol/L 137   POTASSIUM mmol/L 3.5   TOTAL CO2 mmol/L 27.0   CREATININE mg/dL 0.80   GLUCOSE mg/dL 84       I have reviewed the medications.    ---------------------------------------------------------------------------------------------  Assessment/Plan   Assessment & Plan  Assessment/Problem List    Principal Problem:    Incarcerated left inguinal hernia, reduced, s/p surgery, no complication.      Chronic atrial fibrillation was on Eliquis at home.      Confusion  probably delirium.       Essential hypertension      Seizure disorder      Late onset Alzheimer's disease without behavioral disturbance      Cachexia.       Plan  - Continue current care.  - Labs in a.m.      DVT prophylaxis:  Discharge Planning: I expect patient to be discharged to be determined.    Henrry Lucas MD 06/16/17 3:13 PM

## 2017-06-17 LAB — POTASSIUM BLD-SCNC: 4.3 MMOL/L (ref 3.5–5.5)

## 2017-06-17 PROCEDURE — 25010000002 HALOPERIDOL LACTATE PER 5 MG: Performed by: NURSE PRACTITIONER

## 2017-06-17 PROCEDURE — 99232 SBSQ HOSP IP/OBS MODERATE 35: CPT | Performed by: INTERNAL MEDICINE

## 2017-06-17 PROCEDURE — 25010000002 LORAZEPAM PER 2 MG: Performed by: FAMILY MEDICINE

## 2017-06-17 PROCEDURE — 84132 ASSAY OF SERUM POTASSIUM: CPT | Performed by: INTERNAL MEDICINE

## 2017-06-17 PROCEDURE — 92612 ENDOSCOPY SWALLOW (FEES) VID: CPT

## 2017-06-17 PROCEDURE — 25010000002 LEVETIRACETAM IN NACL 0.82% 500 MG/100ML SOLUTION: Performed by: NURSE PRACTITIONER

## 2017-06-17 RX ORDER — HALOPERIDOL 5 MG/ML
1 INJECTION INTRAMUSCULAR ONCE
Status: COMPLETED | OUTPATIENT
Start: 2017-06-17 | End: 2017-06-17

## 2017-06-17 RX ORDER — LORAZEPAM 2 MG/ML
0.25 INJECTION INTRAMUSCULAR ONCE
Status: DISCONTINUED | OUTPATIENT
Start: 2017-06-17 | End: 2017-06-21 | Stop reason: HOSPADM

## 2017-06-17 RX ORDER — LORAZEPAM 2 MG/ML
0.5 INJECTION INTRAMUSCULAR EVERY 6 HOURS PRN
Status: DISCONTINUED | OUTPATIENT
Start: 2017-06-17 | End: 2017-06-21 | Stop reason: HOSPADM

## 2017-06-17 RX ORDER — LEVETIRACETAM 5 MG/ML
500 INJECTION INTRAVASCULAR EVERY 12 HOURS SCHEDULED
Status: DISCONTINUED | OUTPATIENT
Start: 2017-06-17 | End: 2017-06-20

## 2017-06-17 RX ORDER — LEVETIRACETAM 500 MG/1
500 TABLET ORAL EVERY 12 HOURS SCHEDULED
Status: DISCONTINUED | OUTPATIENT
Start: 2017-06-17 | End: 2017-06-17 | Stop reason: SDUPTHER

## 2017-06-17 RX ADMIN — LEVETIRACETAM 500 MG: 5 INJECTION INTRAVENOUS at 20:21

## 2017-06-17 RX ADMIN — TIMOLOL MALEATE 1 DROP: 2.5 SOLUTION/ DROPS OPHTHALMIC at 09:11

## 2017-06-17 RX ADMIN — MEMANTINE 5 MG: 10 TABLET ORAL at 09:05

## 2017-06-17 RX ADMIN — LATANOPROST 1 DROP: 50 SOLUTION/ DROPS OPHTHALMIC at 20:21

## 2017-06-17 RX ADMIN — LEVETIRACETAM 500 MG: 500 TABLET ORAL at 09:05

## 2017-06-17 RX ADMIN — DORZOLAMIDE HYDROCHLORIDE 1 DROP: 20 SOLUTION/ DROPS OPHTHALMIC at 19:06

## 2017-06-17 RX ADMIN — DORZOLAMIDE HYDROCHLORIDE 1 DROP: 20 SOLUTION/ DROPS OPHTHALMIC at 09:11

## 2017-06-17 RX ADMIN — LORAZEPAM 0.5 MG: 2 INJECTION, SOLUTION INTRAMUSCULAR; INTRAVENOUS at 19:37

## 2017-06-17 RX ADMIN — HALOPERIDOL LACTATE 1 MG: 5 INJECTION, SOLUTION INTRAMUSCULAR at 00:33

## 2017-06-17 RX ADMIN — QUETIAPINE FUMARATE 25 MG: 25 TABLET, FILM COATED ORAL at 09:05

## 2017-06-17 RX ADMIN — MAGNESIUM SULFATE HEPTAHYDRATE 4 G: 40 INJECTION, SOLUTION INTRAVENOUS at 09:12

## 2017-06-17 NOTE — PLAN OF CARE
Problem: Patient Care Overview (Adult)  Goal: Plan of Care Review  Outcome: Ongoing (interventions implemented as appropriate)    06/17/17 7656   Coping/Psychosocial Response Interventions   Plan Of Care Reviewed With patient;spouse   Patient Care Overview   Progress progress towards functional goals is fair   Outcome Evaluation   Outcome Summary/Follow up Plan FEES performed d/t h/o silent aspiration. PO trials of thin, NTL and pudding via tsp only. Visualization difficult due to hypopharynx contraction and posterior resting epiglottis. Delayed pharyngeal initiation of swallow at least to pyriforms was visualized, unable to r/o aspiration d/t delay, across consistencies. Decreased base of tongue retraction, posterior pharyngeal wall squeeze, and decreased hyolaryngeal elevation and excursion resulting in diffuse moderate residue. Decreased vestibular closure during the swallow resulting in penetration at least to the true vocal folds, unable to r/o aspiration during the swallow d/t limited view. Residue was visualized post-swallow in vestibule and on arytenoids, spilling into airway ultimately resulting in aspiration across consistencies. Recommend NPO with oral care BID and PRN, alternate means of nutrtion at MD discretion, meds alternate route, re-eval Monday.

## 2017-06-17 NOTE — PLAN OF CARE
Problem: Patient Care Overview (Adult)  Goal: Plan of Care Review  Outcome: Ongoing (interventions implemented as appropriate)  Goal: Adult Individualization and Mutuality  Outcome: Outcome(s) achieved Date Met:  06/17/17  Goal: Discharge Needs Assessment  Outcome: Ongoing (interventions implemented as appropriate)    Problem: Fall Risk (Adult)  Goal: Identify Related Risk Factors and Signs and Symptoms  Outcome: Outcome(s) achieved Date Met:  06/17/17  Goal: Absence of Falls  Outcome: Ongoing (interventions implemented as appropriate)    Problem: Pressure Ulcer Risk (Rosalino Scale) (Adult,Obstetrics,Pediatric)  Goal: Identify Related Risk Factors and Signs and Symptoms  Outcome: Outcome(s) achieved Date Met:  06/17/17  Goal: Skin Integrity  Outcome: Ongoing (interventions implemented as appropriate)    Problem: Nutrition, Imbalanced: Inadequate Oral Intake (Adult)  Goal: Identify Related Risk Factors and Signs and Symptoms  Outcome: Outcome(s) achieved Date Met:  06/17/17  Goal: Improved Oral Intake  Outcome: Ongoing (interventions implemented as appropriate)  Goal: Prevent Further Weight Loss  Outcome: Ongoing (interventions implemented as appropriate)    Problem: Skin Integrity Impairment, Risk/Actual (Adult)  Goal: Identify Related Risk Factors and Signs and Symptoms  Outcome: Outcome(s) achieved Date Met:  06/17/17  Goal: Skin Integrity/Wound Healing  Outcome: Ongoing (interventions implemented as appropriate)    06/17/17 1720   Skin Integrity Impairment, Risk/Actual (Adult)   Skin Integrity/Wound Healing making progress toward outcome

## 2017-06-17 NOTE — MBS/VFSS/FEES
Acute Care - Speech Language Pathology   Swallow Initial Evaluation  [place]                 Fiberoptic Endoscopic Evaluation of Swallowing (FEES)    Patient Name: Paul Lyons  : 1927  MRN: [id number]  Today's Date: 2017               Admit Date: 2017    Visit Dx:     ICD-10-CM ICD-9-CM   1. Inguinal hernia K40.90 550.90     Patient Active Problem List   Diagnosis   • Lung nodule   • CAD (coronary artery disease)   • Chronic atrial fibrillation   • Essential hypertension   • Hyperlipemia   • Shoulder bursitis   • Seizure disorder   • Late onset Alzheimer's disease without behavioral disturbance   • History of skin cancer   • BPH (benign prostatic hyperplasia)   • Glaucoma   • Hiatal hernia with GERD   • LEE on CPAP   • Bradycardia   • Ataxia   • Weakness   • GERD (gastroesophageal reflux disease)   • Incarcerated left inguinal hernia   • Chronic kidney disease   • Severe malnutrition     Past Medical History:   Diagnosis Date   • Abnormal CT scan, lung     PULMONARY NODULE (<6CM)   • Atrial fibrillation    • BPH (benign prostatic hyperplasia) 2017   • BPH (benign prostatic hypertrophy)    • Bradycardia 2017   • Cardiac disorder    • Cataract     STATUS POST REMOVAL WITH LENS IMPLANT   • Coronary artery disease    • Esophageal stricture     WTIH NOTED DILATION   • GERD (gastroesophageal reflux disease)    • Glaucoma    • Glaucoma    • H/O inguinal hernia repair    • Hiatal hernia    • Hiatal hernia with GERD 2017   • Hypercholesteremia    • Hypertension    • Malignant neoplasm    • Obstructive sleep apnea on CPAP    • LEE on CPAP 2017   • Paroxysmal atrial fibrillation    • Peripheral vascular disease    • Right upper lobe pneumonia    • Seizure disorder    • Skin cancer     WITH REMOVAL   • TIA (transient ischemic attack) 2013    Carotid duplex 50% right ICA.     Past Surgical History:   Procedure Laterality Date   • BACK SURGERY     • CATARACT EXTRACTION WITH  INTRAOCULAR LENS IMPLANT     • CERVICAL LAMINECTOMY     • CORONARY ARTERY BYPASS GRAFT  04/15/2002    x4   • ESOPHAGEAL DILATATION     • HERNIA REPAIR     • INGUINAL HERNIA REPAIR Left 6/15/2017    Procedure: LEFT INGUINAL HERNIA REPAIR;  Surgeon: Riaz Grey MD;  Location: [facility];  Service:    • LUMBAR LAMINECTOMY  02/2012   • SKIN CANCER EXCISION            SWALLOW EVALUATION (last 72 hours)      Swallow Evaluation       06/17/17 1130                Rehab Evaluation    Document Type evaluation  -JW        Subjective Information --   confused  -JW        Patient Effort, Rehab Treatment good  -JW        Symptoms Noted During/After Treatment none  -JW        General Information    Patient Profile Review yes  -JW        Subjective Patient Observations alert but confused.   -JW        Pertinent History Of Current Problem dementia, h/o silent aspiration, hernia  -JW        Current Diet Limitations regular solid  -JW        Prior Level of Function- Communication --   impaired d/t cognition  -JW        Prior Level of Function- Swallowing no diet consistency restrictions  -JW        Plans/Goals Discussed With patient and family  -JW        Barriers to Rehab cognitive status;previous functional deficit  -JW        Fiberoptic Endoscopic Swallowing Exam    Risks/Benefits Reviewed risks/benefits explained;agreed to eval;patient;family  -JW        Nasal Entry, Topical Anesthetic left:;nostril entered using no topical anesthetic  -JW        Anatomy and Physiology    Velopharyngeal WFL  -JW        Base of Tongue symmetrical  -JW        Epiglottis rests posteriorly  -JW        Laryngeal Function Breathing CNA  -JW        Laryngeal Function Phonation CNA  -JW        Laryngeal Function Breath Hold CNA  -JW        Secretions 0- Normal, no visible secretions  -JW        Secretion Description thin;clear  -JW        Ice Chips DNA  -JW        Spontaneous Swallow frequency reduced  -JW        Sensory reduced sensation  -JW         Oral-Phary Transit WFL   for limited trials presented  -        Anatomy Hypopharynx    Observation Anatomic Considerations no anatomic structural deviation via FEES  -        FEES    Mode of Presentation thin:;nectar:;pudding:;fed by clinician;spoon  -        Pharyngeal Phase Impairment all consistencies:;bolus to pyriforms before initiation of response;reduced pharyngeal wall contraction;tongue base retraction reduced;reduced laryngeal elevation;reduced anterior hyolaryngeal excursion;reduced closure vestibule;aspiration after from residue  -        Post Swallow Residue all consistencies:;residue present pyriform sinuses;residue present on pharyngeal walls;unable to clear residue in pyriform sinuses;post swallow residue present;residue present in valleculae  -        Rosenbek's Scale 8-->Level 8  -        Response to Aspiration absent response, silent aspiration  -        Pharyngeal Phase Results impaired pharyngeal phase of swallowing  -        FEES Summary FEES performed d/t h/o silent aspiration. PO trials of thin, NTL and pudding via tsp only. Visualization difficult due to hypopharynx contraction and  posterior resting epiglottis. Delayed pharyngeal initiation of swallow at least to pyriforms was visualized, unable to r/o aspiration d/t delay, across consistencies. Decreased base of tongue retraction, posterior pharyngeal wall squeeze, and decreased hyolaryngeal elevation and excursion resulting in diffuse moderate residue. Decreased vestibular closure during the swallow resulting in penetration at least to the true vocal folds, unable to r/o aspiration during the swallow d/t limited view. Residue was visualized post-swallow in vestibule and on arytenoids, spilling into airway ultimately resulting in aspiration across consistencies. Recommend NPO with oral care BID and PRN, alternate means of nutrtion at MD discretion, meds alternate route, re-eval Monday.  -        FEES Swallow  Recommendations    Oral Care oral care with toothbrush and dentifrice BID and PRN  -        Recommended Diet NPO: unsafe for food/liquid intake;temporary alternative means of nutrition/hydration  -        Ther Interventions Swallowing Comment re-assess monday via FEES or MBS d/t silent aspiration  -          User Key  (r) = Recorded By, (t) = Taken By, (c) = Cosigned By    Initials Name Effective Dates     Mihaela Meyers Al, MS CCC-SLP 08/11/15 -         EDUCATION  The patient has been educated in the following areas:   Dysphagia (Swallowing Impairment) NPO rationale.    SLP Recommendation and Plan           Plan of Care Review  Plan Of Care Reviewed With: patient, spouse  Progress: progress towards functional goals is fair  Outcome Summary/Follow up Plan: FEES performed d/t h/o silent aspiration. PO trials of thin, NTL and pudding via tsp only. Visualization difficult due to hypopharynx contraction and  posterior resting epiglottis. Delayed pharyngeal initiation of swallow at least to pyriforms was visualized, unable to r/o aspiration d/t delay, across consistencies. Decreased base of tongue retraction, posterior pharyngeal wall squeeze, and decreased hyolaryngeal elevation and excursion resulting in diffuse moderate residue. Decreased vestibular closure during the swallow resulting in penetration at least to the true vocal folds, unable to r/o aspiration during the swallow d/t limited view. Residue was visualized post-swallow in vestibule and on arytenoids, spilling into airway ultimately resulting in aspiration across consistencies. Recommend NPO with oral care BID and PRN, alternate means of nutrtion at MD discretion, meds alternate route, re-eval Monday.             Time Calculation:         Time Calculation- SLP       06/17/17 2304          Time Calculation- SLP    SLP Start Time 1130  -      SLP Received On 06/17/17  -        User Key  (r) = Recorded By, (t) = Taken By, (c) = Cosigned By     Initials Name Provider Type     Mihaela Melendez, MS CCC-SLP Speech and Language Pathologist          Therapy Charges for Today     Code Description Service Date Service Provider Modifiers Qty    [id number] HC ST FIBEROPTIC ENDO EVAL SWALL 6 6/17/2017 Mihaela Melendez, MS CCC-SLP GN 1               Mihaela Melendez, MS PUJA-SLP  6/17/2017

## 2017-06-17 NOTE — PROGRESS NOTES
Hospitalist Daily Progress Note    Date of Admission: 6/13/2017   LOS: 4 days   PCP: Blake Gomez MD    Chief Complaint: Abdominal pain    Subjective    Patient  very confused again this morning and  he was a bit aggressive and was putting the lines out. He was more calm when I saw him but still was very confused and disoriented.  No chest pain or palpitation.  No cough or shortness of breath.  No nausea, vomiting, diarrhea, abdominal pain.    History of Present Illness  Unable to obtain secondary to patient's mental status.    Objective   Physical Exam:  I have reviewed the vital signs.  Temp:  [97.8 °F (36.6 °C)-98.7 °F (37.1 °C)] 97.8 °F (36.6 °C)  Heart Rate:  [55-60] 60  Resp:  [16] 16  BP: (123-140)/(55-57) 140/57    Objective  General Appearance:    Alert, cooperative, no distress  Head:    Normocephalic, atraumatic  Eyes:    Sclerae anicteric  Neck:   Supple, no mass  Lungs: Clear to auscultation bilaterally, respirations unlabored  Heart: Regular rate and rhythm, S1 and S2 normal, no murmur, rub or gallop  Abdomen:  Soft, non-tender, bowel sounds active, nondistended  Extremities: Cachectic No clubbing, cyanosis, or edema to lower extremities  Skin: No rashes   Neurologic: Awake, alert, very confused but follows simple commands.    Results Review:    I have reviewed the labs, radiology results and diagnostic studies.      Results from last 7 days  Lab Units 06/16/17  0535   WBC 10*3/mm3 6.37   HEMOGLOBIN g/dL 12.6*   PLATELETS 10*3/mm3 230       Results from last 7 days  Lab Units 06/17/17  0516 06/16/17  0535   SODIUM mmol/L  --  137   POTASSIUM mmol/L 4.3 3.5   TOTAL CO2 mmol/L  --  27.0   CREATININE mg/dL  --  0.80   GLUCOSE mg/dL  --  84       I have reviewed the medications.    ---------------------------------------------------------------------------------------------  Assessment/Plan   Assessment & Plan  Assessment/Problem List    Principal Problem:    Incarcerated left inguinal hernia,  reduced, s/p surgery, no complication.      Chronic atrial fibrillation was on Eliquis at home.      Confusion probably delirium.       Essential hypertension      Seizure disorder      Late onset Alzheimer's disease without behavioral disturbance      Cachexia.       Plan  - Continue current care.        DVT prophylaxis:  Discharge Planning: I expect patient to be discharged to be determined.    Henrry Lucas MD 06/17/17 3:42 PM

## 2017-06-18 ENCOUNTER — APPOINTMENT (OUTPATIENT)
Dept: GENERAL RADIOLOGY | Facility: HOSPITAL | Age: 82
End: 2017-06-18

## 2017-06-18 LAB — MAGNESIUM SERPL-MCNC: 2.4 MG/DL (ref 1.3–2.7)

## 2017-06-18 PROCEDURE — 71020 HC CHEST PA AND LATERAL: CPT

## 2017-06-18 PROCEDURE — 99233 SBSQ HOSP IP/OBS HIGH 50: CPT | Performed by: NURSE PRACTITIONER

## 2017-06-18 PROCEDURE — 83735 ASSAY OF MAGNESIUM: CPT | Performed by: INTERNAL MEDICINE

## 2017-06-18 PROCEDURE — 25010000002 LEVETIRACETAM IN NACL 0.82% 500 MG/100ML SOLUTION: Performed by: NURSE PRACTITIONER

## 2017-06-18 RX ADMIN — TIMOLOL MALEATE 1 DROP: 2.5 SOLUTION/ DROPS OPHTHALMIC at 08:30

## 2017-06-18 RX ADMIN — LEVETIRACETAM 500 MG: 5 INJECTION INTRAVENOUS at 20:28

## 2017-06-18 RX ADMIN — LEVETIRACETAM 500 MG: 5 INJECTION INTRAVENOUS at 08:30

## 2017-06-18 RX ADMIN — LATANOPROST 1 DROP: 50 SOLUTION/ DROPS OPHTHALMIC at 20:46

## 2017-06-18 RX ADMIN — DORZOLAMIDE HYDROCHLORIDE 1 DROP: 20 SOLUTION/ DROPS OPHTHALMIC at 08:30

## 2017-06-18 RX ADMIN — DORZOLAMIDE HYDROCHLORIDE 1 DROP: 20 SOLUTION/ DROPS OPHTHALMIC at 17:47

## 2017-06-18 NOTE — PROGRESS NOTES
Hospitalist Daily Progress Note    Date of Admission: 6/13/2017   LOS: 5 days   PCP: Blake Gomez MD    Chief Complaint: Abdominal pain    Subjective    Patient very confused still, no agitiation. Wife at bedside concerned about patient's ability to swallow. Underwent eval per SLP and now NPO. Wife feels he breathing sounds wet today.    History of Present Illness  Unable to obtain secondary to patient's mental status.    Objective   Physical Exam:  I have reviewed the vital signs.  Temp:  [97.5 °F (36.4 °C)-98.5 °F (36.9 °C)] 98.5 °F (36.9 °C)  Heart Rate:  [77-83] 83  Resp:  [16-18] 16  BP: (128-155)/(76-81) 128/81    Objective  General Appearance:    Alert, cooperative, no distress  Head:    Normocephalic, atraumatic  Eyes:    Sclerae anicteric  Neck:   Supple, no mass  Lungs: Clear to auscultation bilaterally, respirations unlabored- upper airway rhonchi  Heart: Regular rate and rhythm, S1 and S2 normal, no murmur, rub or gallop  Abdomen:  Soft, non-tender, bowel sounds active, nondistended  Extremities: Cachectic No clubbing, cyanosis, or edema to lower extremities  Skin: No rashes   Neurologic: Awake, alert, very confused but follows simple commands.    Results Review:    I have reviewed the labs, radiology results and diagnostic studies.      Results from last 7 days  Lab Units 06/16/17  0535   WBC 10*3/mm3 6.37   HEMOGLOBIN g/dL 12.6*   PLATELETS 10*3/mm3 230       Results from last 7 days  Lab Units 06/17/17  0516 06/16/17  0535   SODIUM mmol/L  --  137   POTASSIUM mmol/L 4.3 3.5   TOTAL CO2 mmol/L  --  27.0   CREATININE mg/dL  --  0.80   GLUCOSE mg/dL  --  84       I have reviewed the medications.    ---------------------------------------------------------------------------------------------  Assessment/Plan   Assessment & Plan  Assessment/Problem List    Principal Problem:    Incarcerated left inguinal hernia, reduced, s/p surgery, no complication.      Chronic atrial fibrillation was on Eliquis at  home.      Confusion probably delirium.       Essential hypertension      Seizure disorder      Late onset Alzheimer's disease without behavioral disturbance      Cachexia.       Plan  - restart Eliquis today  -- follow up with Dr. Mendez in 2 weeks, healing well  -- MBS tomorrow for safe discharge diet. Discussed with wife who agrees that patient would not do well with PEG/ NPO diet given dementia. Plan to discharge on safest diet possible with SLP therapy outpatient/ home tx  -- CXR today  -- cbc in am  -- if continues to progress and safe discharge plan, possibly home tomorrow      DVT prophylaxis:eliquis  Discharge Planning: I expect patient to be discharged 1-2 days    Brittani Macias, APRN 06/18/17 11:52 AM

## 2017-06-18 NOTE — PLAN OF CARE
Problem: Patient Care Overview (Adult)  Goal: Discharge Needs Assessment  Outcome: Ongoing (interventions implemented as appropriate)  Goal: Adult Individualization and Mutuality  Outcome: Ongoing (interventions implemented as appropriate)    Problem: Fall Risk (Adult)  Goal: Absence of Falls  Outcome: Ongoing (interventions implemented as appropriate)    Problem: Pressure Ulcer Risk (Rosalino Scale) (Adult,Obstetrics,Pediatric)  Goal: Skin Integrity  Outcome: Ongoing (interventions implemented as appropriate)    Problem: Nutrition, Imbalanced: Inadequate Oral Intake (Adult)  Goal: Improved Oral Intake  Outcome: Ongoing (interventions implemented as appropriate)  Goal: Prevent Further Weight Loss  Outcome: Ongoing (interventions implemented as appropriate)  Goal: Improved Oral Intake  Outcome: Ongoing (interventions implemented as appropriate)  Goal: Prevent Further Weight Loss  Outcome: Ongoing (interventions implemented as appropriate)    Problem: Skin Integrity Impairment, Risk/Actual (Adult)  Goal: Skin Integrity/Wound Healing  Outcome: Ongoing (interventions implemented as appropriate)    Problem: Perioperative Period (Adult)  Goal: Signs and Symptoms of Listed Potential Problems Will be Absent or Manageable (Perioperative Period)  Outcome: Ongoing (interventions implemented as appropriate)

## 2017-06-19 ENCOUNTER — APPOINTMENT (OUTPATIENT)
Dept: GENERAL RADIOLOGY | Facility: HOSPITAL | Age: 82
End: 2017-06-19
Attending: INTERNAL MEDICINE

## 2017-06-19 LAB
DEPRECATED RDW RBC AUTO: 47.5 FL (ref 37–54)
ERYTHROCYTE [DISTWIDTH] IN BLOOD BY AUTOMATED COUNT: 15.1 % (ref 11.3–14.5)
HCT VFR BLD AUTO: 44.9 % (ref 38.9–50.9)
HGB BLD-MCNC: 14.6 G/DL (ref 13.1–17.5)
MCH RBC QN AUTO: 27.9 PG (ref 27–31)
MCHC RBC AUTO-ENTMCNC: 32.5 G/DL (ref 32–36)
MCV RBC AUTO: 85.7 FL (ref 80–99)
PLATELET # BLD AUTO: 303 10*3/MM3 (ref 150–450)
PMV BLD AUTO: 9.6 FL (ref 6–12)
RBC # BLD AUTO: 5.24 10*6/MM3 (ref 4.2–5.76)
WBC NRBC COR # BLD: 10.58 10*3/MM3 (ref 3.5–10.8)

## 2017-06-19 PROCEDURE — 99233 SBSQ HOSP IP/OBS HIGH 50: CPT | Performed by: INTERNAL MEDICINE

## 2017-06-19 PROCEDURE — 97161 PT EVAL LOW COMPLEX 20 MIN: CPT

## 2017-06-19 PROCEDURE — 25010000002 LEVETIRACETAM IN NACL 0.82% 500 MG/100ML SOLUTION: Performed by: NURSE PRACTITIONER

## 2017-06-19 PROCEDURE — 85027 COMPLETE CBC AUTOMATED: CPT | Performed by: NURSE PRACTITIONER

## 2017-06-19 PROCEDURE — 97162 PT EVAL MOD COMPLEX 30 MIN: CPT

## 2017-06-19 PROCEDURE — 74230 X-RAY XM SWLNG FUNCJ C+: CPT

## 2017-06-19 RX ADMIN — LEVETIRACETAM 500 MG: 5 INJECTION INTRAVENOUS at 21:57

## 2017-06-19 RX ADMIN — LEVETIRACETAM 500 MG: 5 INJECTION INTRAVENOUS at 09:22

## 2017-06-19 RX ADMIN — APIXABAN 2.5 MG: 2.5 TABLET, FILM COATED ORAL at 21:58

## 2017-06-19 RX ADMIN — LATANOPROST 1 DROP: 50 SOLUTION/ DROPS OPHTHALMIC at 21:57

## 2017-06-19 RX ADMIN — BARIUM SULFATE 20 ML: 400 PASTE ORAL at 14:11

## 2017-06-19 RX ADMIN — QUETIAPINE FUMARATE 25 MG: 25 TABLET, FILM COATED ORAL at 21:58

## 2017-06-19 RX ADMIN — DORZOLAMIDE HYDROCHLORIDE 1 DROP: 20 SOLUTION/ DROPS OPHTHALMIC at 09:21

## 2017-06-19 RX ADMIN — BARIUM SULFATE 50 ML: 400 SUSPENSION ORAL at 14:10

## 2017-06-19 RX ADMIN — MEMANTINE 5 MG: 10 TABLET ORAL at 21:58

## 2017-06-19 RX ADMIN — TIMOLOL MALEATE 1 DROP: 2.5 SOLUTION/ DROPS OPHTHALMIC at 09:21

## 2017-06-19 RX ADMIN — BARIUM SULFATE 100 ML: 0.81 POWDER, FOR SUSPENSION ORAL at 14:12

## 2017-06-19 NOTE — PROGRESS NOTES
Hospitalist Daily Progress Note    Date of Admission: 6/13/2017   LOS: 6 days   PCP: Blake Gomez MD    Chief Complaint: Abdominal pain    Subjective    Patient calm, no complaints  Wife says he hasnt walked since surgery  [place] be able to walk to [place].    No other complaitns.      History of Present Illness  Unable to obtain secondary to patient's mental status.    Objective   Physical Exam:  I have reviewed the vital signs.  Temp:  [95.6 °F (35.3 °C)] 95.6 °F (35.3 °C)  Heart Rate:  [79] 79  Resp:  [16] 16  BP: (120)/(66) 120/66    Objective  General Appearance:    Alert, cooperative, no distress.  Awaiting transport for [facility].  Calm  Head:    Normocephalic, atraumatic  Eyes:    Sclerae anicteric  Neck:   Supple, no mass  Lungs: Clear to auscultation bilaterally, respirations unlabored- upper airway rhonchi  Heart: Regular rate and rhythm, S1 and S2 normal, no murmur, rub or gallop  Abdomen:  Soft, non-tender, bowel sounds active, nondistended  Extremities: Cachectic No clubbing, cyanosis, or edema to lower extremities  Skin: No rashes   Neurologic: Awake, alert, very confused but follows simple commands.    Results Review:    I have reviewed the labs, radiology results and diagnostic studies.      Results from last 7 days  Lab Units 06/19/17  0605   WBC 10*3/mm3 10.58   HEMOGLOBIN g/dL 14.6   PLATELETS 10*3/mm3 303       Results from last 7 days  Lab Units 06/17/17  0516 06/16/17  0535   SODIUM mmol/L  --  137   POTASSIUM mmol/L 4.3 3.5   TOTAL CO2 mmol/L  --  27.0   CREATININE mg/dL  --  0.80   GLUCOSE mg/dL  --  84       I have reviewed the medications.    ---------------------------------------------------------------------------------------------  Assessment/Plan   Assessment & Plan  Assessment/Problem List    Principal Problem:    Incarcerated left inguinal hernia, reduced, s/p surgery, no complication.      Chronic atrial fibrillation was on Eliquis at home.       Confusion probably delirium.       Essential hypertension      Seizure disorder      Late onset Alzheimer's disease without behavioral disturbance      Cachexia.       Plan  - restarted Eliquis    -- follow up with Dr. Mendez in 2 weeks, healing well  -dysphagia:  -- MBS to establish safe discharge diet.   -- Discussed with wife who agrees that patient would not do well with PEG/ NPO diet given dementia.   -- Plan to discharge on safest diet possible with SLP therapy outpatient/ home tx    Home today if he can walk.   Needs PT          DVT prophylaxis:eliquis  Discharge Planning: I expect patient to be discharged 1-2 days    Le Mann MD 06/19/17 1:05 PM

## 2017-06-19 NOTE — PLAN OF CARE
Problem: Patient Care Overview (Adult)  Goal: Plan of Care Review  Outcome: Ongoing (interventions implemented as appropriate)    06/19/17 1636   Coping/Psychosocial Response Interventions   Plan Of Care Reviewed With patient   Patient Care Overview   Progress (MBS)   Outcome Evaluation   Outcome Summary/Follow up Plan MBS compelted. Pt presented w/ moderate oropharyngeal dysphagia. Inc'd prep time w/ solid 2' ltd dentition. No aspiration with pureed or NT trials via tsp and cup. Deep penetration during the swallow with thins via tsp, cup and straw and NT via straw. Residue coated into vestibule as study pregressed and resulted in aspiration after the swallow. All aspiration was silent. Moderate-severe valleculae and pys residue w/ all which majoiry cleared w/ subsequent swallows and liquid washes of NT. However, at the end of exam with solid item, deep penetration resulted 2' pys and valleculae residue accumulating then spilling into vestibule w/ subsequent swallows (non-cohesive texture and inadequate masication of solid caused this)-- trace amounts fell to TVC level. With cued cough, pt cleared. Rec: L3, NT, no straws, dys tx, con't dys tx at home w/ HH at d/c. SLP to f/u for further education w/ pt and wife.          Problem: Inpatient SLP  Goal: Dysphagia- Patient will safely consume diet as per recommendation with no signs/symptoms of aspiration  Outcome: Ongoing (interventions implemented as appropriate)    06/19/17 1636   Safely Consume Diet   Safely Consume Diet- SLP, Date Established 06/19/17   Safely Consume Diet- SLP, Time to Achieve by discharge   Safely Consume Diet- SLP, Date Goal Reviewed 06/19/17   Safely Consume Diet- SLP, Outcome goal ongoing

## 2017-06-19 NOTE — PROGRESS NOTES
Continued Stay Note  [facility]     Patient Name: Paul Lyons  MRN: [id number]  Today's Date: 6/19/2017    Admit Date: 6/13/2017          Discharge Plan       06/19/17 1504    Case Management/Social Work Plan    Plan Home vs placement    Additional Comments Spoke with patient. He was confused. Spoke with wife. She wants Mr. Lyons to be evaluated by PT/OT before deciding if he can go home. PT/OT consults ordered today. Will revisit tomorrow after PT/OT evals. Rema Torres RN x [phone]              Discharge Codes     None        Expected Discharge Date and Time     Expected Discharge Date Expected Discharge Time    Jun 20, 2017             Rema Torres RN

## 2017-06-19 NOTE — THERAPY EVALUATION
Acute Care - Physical Therapy Initial Evaluation  [facility]     Patient Name: Paul Lyons  : 1927  MRN: [id number]  Today's Date: 2017   Onset of Illness/Injury or Date of Surgery Date: 17  Date of Referral to PT: 17  Referring Physician: MD Mackenzie      Admit Date: 2017     Visit Dx:    ICD-10-CM ICD-9-CM   1. Impaired functional mobility, balance, gait, and endurance Z74.09 V49.89   2. Inguinal hernia K40.90 550.90     Patient Active Problem List   Diagnosis   • Lung nodule   • CAD (coronary artery disease)   • Chronic atrial fibrillation   • Essential hypertension   • Hyperlipemia   • Shoulder bursitis   • Seizure disorder   • Late onset Alzheimer's disease without behavioral disturbance   • History of skin cancer   • BPH (benign prostatic hyperplasia)   • Glaucoma   • Hiatal hernia with GERD   • LEE on CPAP   • Bradycardia   • Ataxia   • Weakness   • GERD (gastroesophageal reflux disease)   • Incarcerated left inguinal hernia   • Chronic kidney disease   • Severe malnutrition     Past Medical History:   Diagnosis Date   • Abnormal CT scan, lung     PULMONARY NODULE (<6CM)   • Atrial fibrillation    • BPH (benign prostatic hyperplasia) 2017   • BPH (benign prostatic hypertrophy)    • Bradycardia 2017   • Cardiac disorder    • Cataract     STATUS POST REMOVAL WITH LENS IMPLANT   • Coronary artery disease    • Esophageal stricture     WTIH NOTED DILATION   • GERD (gastroesophageal reflux disease)    • Glaucoma    • Glaucoma    • H/O inguinal hernia repair    • Hiatal hernia    • Hiatal hernia with GERD 2017   • Hypercholesteremia    • Hypertension    • Malignant neoplasm    • Obstructive sleep apnea on CPAP    • LEE on CPAP 2017   • Paroxysmal atrial fibrillation    • Peripheral vascular disease    • Right upper lobe pneumonia    • Seizure disorder    • Skin cancer     WITH REMOVAL   • TIA (transient ischemic attack) 2013    Carotid duplex 50% right ICA.      Past Surgical History:   Procedure Laterality Date   • BACK SURGERY     • CATARACT EXTRACTION WITH INTRAOCULAR LENS IMPLANT     • CERVICAL LAMINECTOMY     • CORONARY ARTERY BYPASS GRAFT  04/15/2002    x4   • ESOPHAGEAL DILATATION     • HERNIA REPAIR     • INGUINAL HERNIA REPAIR Left 6/15/2017    Procedure: LEFT INGUINAL HERNIA REPAIR;  Surgeon: Riaz Grey MD;  Location: [facility] OR;  Service:    • LUMBAR LAMINECTOMY  02/2012   • SKIN CANCER EXCISION            PT ASSESSMENT (last 72 hours)      PT Evaluation       06/19/17 1520 06/19/17 0800    Rehab Evaluation    Document Type evaluation  -MJ     Subjective Information agree to therapy;complains of;fatigue  -MJ     Patient Effort, Rehab Treatment good  -MJ     Symptoms Noted During/After Treatment none  -MJ     General Information    Patient Profile Review yes  -MJ     Onset of Illness/Injury or Date of Surgery Date 06/13/17  -MJ     Referring Physician MD Mackenzie  -MJ     General Observations Pt supine in bed, IV heplocked, tele, exit alarm. Wife present  -MJ     Pertinent History Of Current Problem Pt presents with L inguinal hernia. Was seen at [facility] and hernia reduced non-surgically, but presents to [facility] with c/o nausea vomitting.   -MJ     Precautions/Limitations fall precautions;other (see comments)   confusion; exit alarm  -MJ     Prior Level of Function independent:;all household mobility;community mobility;gait;transfer;bed mobility  -MJ     Equipment Currently Used at Home walker, rolling;cane, straight;shower chair;grab bar   owns, was not using  -MJ     Plans/Goals Discussed With patient;agreed upon  -     Risks Reviewed patient:;LOB;increased discomfort  -MJ     Benefits Reviewed patient:;improve function;increase independence;increase strength;increase balance;decrease pain  -MJ     Barriers to Rehab medically complex;cognitive status  -MJ     Living Environment    Lives With spouse  -MJ     Living Arrangements house  -MJ     Home Accessibility  stairs to enter home;stairs within home;tub/shower is not walk in  -MJ     Number of Stairs to Enter Home 4  -MJ     Number of Stairs Within Home 3  -MJ     Stair Railings at Home inside, present at both sides;outside, present at both sides  -MJ     Clinical Impression    Date of Referral to PT 06/19/17  -MJ     PT Diagnosis s/p L femoral hernia repair  -MJ     Criteria for Skilled Therapeutic Interventions Met yes;treatment indicated  -MJ     Pain Assessment    Pain Assessment No/denies pain  -MJ     Cognitive Assessment/Intervention    Current Cognitive/Communication Assessment impaired  -MJ     Orientation Status oriented to;person;disoriented to;place;time;situation  -MJ     Follows Commands/Answers Questions 100% of the time;able to follow single-step instructions;needs cueing;needs repetition  -MJ     Personal Safety mild impairment;impulsive  -MJ     ROM (Range of Motion)    General ROM upper extremity range of motion deficits identified  -MJ     General UE Assessment    ROM Detail R shoulder AROM impaired 50%  -MJ     MMT (Manual Muscle Testing)    General MMT Assessment lower extremity strength deficits identified  -MJ     Lower Extremity    Lower Ext Manual Muscle Testing Detail B-L LE grossly 4/5  -MJ     Muscle Tone Assessment    Muscle Tone Assessment  Bilateral Upper Extremities;Bilateral Lower Extremities  -SC    Bilateral Upper Extremities Muscle Tone Assessment  moderately decreased tone  -SC    Bilateral Lower Extremities Muscle Tone Assessment  moderately decreased tone  -SC    Bed Mobility, Assessment/Treatment    Bed Mobility, Assistive Device bed rails;head of bed elevated  -MJ     Bed Mob, Supine to Sit, [place] supervision required;verbal cues required  -MJ     Bed Mob, Sit to Supine, [place] supervision required;verbal cues required  -MJ     Bed Mobility, Impairments strength decreased  -MJ     Bed Mobility, Comment Verbal cues to move LEs off EOB and to use UEs to push trunk to  sitting. Increased time and effort to perform  -MJ     Transfer Assessment/Treatment    Transfers, Sit-Stand Page contact guard assist;2 person assist required;verbal cues required  -MJ     Transfers, Stand-Sit [place] contact guard assist;2 person assist required;verbal cues required  -MJ     Transfers, Sit-Stand-Sit, Assist Device rolling walker  -MJ     Transfer, Safety Issues step length decreased;weight-shifting ability decreased  -MJ     Transfer, Impairments strength decreased;impaired balance  -MJ     Transfer, Comment Verbal cues for correct hand placement. Pt initially with posterior lean, corrected with cues  -MJ     Gait Assessment/Treatment    Gait, [place] Level minimum assist (75% patient effort);2 person assist required;verbal cues required  -MJ     Gait, Assistive Device rolling walker  -MJ     Gait, Distance (Feet) 90  -MJ     Gait, Gait Pattern Analysis swing-through gait  -MJ     Gait, Gait Deviations andrez decreased;step length decreased;weight-shifting ability decreased;forward flexed posture  -MJ     Gait, Safety Issues step length decreased;weight-shifting ability decreased  -MJ     Gait, Impairments strength decreased;impaired balance  -MJ     Gait, Comment Pt demo step through gait pattern at slow pace. Verbal cues for upright posture and to stay in middle of RW. Pt required assist to steer RW. Gait limited by fatigue  -     Sensory Assessment/Intervention    Light Touch LLE;RLE  -MJ     LLE Light Touch WNL  -MJ     RLE Light Touch WNL  -MJ     Positioning and Restraints    Pre-Treatment Position in bed  -MJ     Post Treatment Position bed  -MJ     In Bed notified nsg;supine;call light within reach;encouraged to call for assist;exit alarm on;with family/caregiver  -       06/18/17 2000 06/17/17 2000    Muscle Tone Assessment    Muscle Tone Assessment Bilateral Upper Extremities;Bilateral Lower Extremities  -PT Bilateral Upper Extremities;Bilateral Lower Extremities   -PT    Bilateral Upper Extremities Muscle Tone Assessment moderately decreased tone  -PT moderately decreased tone  -PT    Bilateral Lower Extremities Muscle Tone Assessment moderately decreased tone  -PT moderately decreased tone  -PT      06/17/17 1130 06/16/17 2000    Rehab Evaluation    Document Type evaluation  -     Subjective Information --   confused  -JW     Patient Effort, Rehab Treatment good  -JW     Symptoms Noted During/After Treatment none  -JW     Muscle Tone Assessment    Muscle Tone Assessment  Bilateral Upper Extremities;Bilateral Lower Extremities  -PT    Bilateral Upper Extremities Muscle Tone Assessment  moderately decreased tone  -PT    Bilateral Lower Extremities Muscle Tone Assessment  moderately decreased tone  -PT      User Key  (r) = Recorded By, (t) = Taken By, (c) = Cosigned By    Initials Name Provider Type     Mihaela Melednez MS CCC-SLP Speech and Language Pathologist    EDE Hoover, PT Physical Therapist    SC Melanie Palmer, RN Registered Nurse    PT Yessenia Astudillo LPN Licensed Nurse          Physical Therapy Education     Title: PT OT SLP Therapies (Active)     Topic: Physical Therapy (Active)     Point: Mobility training (Done)    Learning Progress Summary    Learner Readiness Method Response Comment Documented by Status   Patient Acceptance D,E BROCK ARENAS   06/19/17 1603 Done               Point: Body mechanics (Done)    Learning Progress Summary    Learner Readiness Method Response Comment Documented by Status   Patient Acceptance D,E DEEPA,NR   06/19/17 1603 Done               Point: Precautions (Done)    Learning Progress Summary    Learner Readiness Method Response Comment Documented by Status   Patient Acceptance MARLENA CUELLARNR   06/19/17 1603 Done                      User Key     Initials Effective Dates Name Provider Type [facility] 03/14/16 -  Nguyễn Hoover, PT Physical Therapist PT                PT Recommendation and Plan  Anticipated Discharge  Disposition: skilled nursing facility  Planned Therapy Interventions: balance training, bed mobility training, gait training, home exercise program, patient/family education, strengthening, transfer training  PT Frequency: daily  Plan of Care Review  Plan Of Care Reviewed With: patient  Outcome Summary/Follow up Plan: Pt ambulated 90 feet with MinAx2 and RW. PT will follow to increase strength, improve balance, and progress functional mobility. Recommend rehab at discharge          IP PT Goals       06/19/17 1606          Bed Mobility PT LTG    Bed Mobility PT LTG, Date Established 06/19/17  -MJ      Bed Mobility PT LTG, Time to Achieve 1 wk  -MJ      Bed Mobility PT LTG, Activity Type supine to sit/sit to supine  -MJ      Bed Mobility PT LTG, [place] Level independent  -MJ      Transfer Training PT LTG    Transfer Training PT LTG, Date Established 06/19/17  -MJ      Transfer Training PT LTG, Time to Achieve 1 wk  -MJ      Transfer Training PT LTG, Activity Type sit to stand/stand to sit  -MJ      Transfer Training PT LTG, [place] Level supervision required  -MJ      Transfer Training PT LTG, Assist Device walker, rolling  -MJ      Gait Training PT LTG    Gait Training Goal PT LTG, Date Established 06/19/17  -MJ      Gait Training Goal PT LTG, Time to Achieve 1 wk  -MJ      Gait Training Goal PT LTG, [place] Level supervision required  -MJ      Gait Training Goal PT LTG, Assist Device walker, rolling  -MJ      Gait Training Goal PT LTG, Distance to Achieve 350 feet  -MJ      Stair Training PT LTG    Stair Training Goal PT LTG, Date Established 06/19/17  -MJ      Stair Training Goal PT LTG, Time to Achieve 1 wk  -MJ      Stair Training Goal PT LTG, Number of Steps 4  -MJ      Stair Training Goal PT LTG, [place] Level supervision required  -MJ      Stair Training Goal PT LTG, Assist Device 2 handrails  -MJ        User Key  (r) = Recorded By, (t) = Taken By, (c) = Cosigned By    Initials Name  Provider Type    EDE Hoover PT Physical Therapist                Outcome Measures       06/19/17 1520          How much help from another person do you currently need...    Turning from your back to your side while in flat bed without using bedrails? 3  -MJ      Moving from lying on back to sitting on the side of a flat bed without bedrails? 3  -MJ      Moving to and from a bed to a chair (including a wheelchair)? 3  -MJ      Standing up from a chair using your arms (e.g., wheelchair, bedside chair)? 3  -MJ      Climbing 3-5 steps with a railing? 2  -MJ      To walk in hospital room? 2  -MJ      AM-PAC 6 Clicks Score 16  -MJ      Functional Assessment    Outcome Measure Options AM-PAC 6 Clicks Basic Mobility (PT)  -        User Key  (r) = Recorded By, (t) = Taken By, (c) = Cosigned By    Initials Name Provider Type    EDE Hoover PT Physical Therapist           Time Calculation:         PT Charges       06/19/17 1609          Time Calculation    Start Time 1520  -      PT Received On 06/19/17  -      PT Goal Re-Cert Due Date 06/29/17  -      Time Calculation- PT    Total Timed Code Minutes- PT 5 minute(s)  -        User Key  (r) = Recorded By, (t) = Taken By, (c) = Cosigned By    Initials Name Provider Type    EDE Hoover PT Physical Therapist          Therapy Charges for Today     Code Description Service Date Service Provider Modifiers Qty    83389396102 HC PT EVAL MOD COMPLEXITY 4 6/19/2017 Nguyễn Hoover PT GP 1          PT G-Codes  Outcome Measure Options: AM-PAC 6 Clicks Basic Mobility (PT)      Nguyễn Hoover PT  6/19/2017

## 2017-06-19 NOTE — PLAN OF CARE
Problem: Patient Care Overview (Adult)  Goal: Plan of Care Review  Outcome: Ongoing (interventions implemented as appropriate)  Goal: Discharge Needs Assessment  Outcome: Ongoing (interventions implemented as appropriate)  Goal: Adult Individualization and Mutuality  Outcome: Ongoing (interventions implemented as appropriate)    Problem: Fall Risk (Adult)  Goal: Absence of Falls  Outcome: Ongoing (interventions implemented as appropriate)

## 2017-06-19 NOTE — PLAN OF CARE
Problem: Patient Care Overview (Adult)  Goal: Plan of Care Review  Outcome: Ongoing (interventions implemented as appropriate)    06/19/17 1606   Coping/Psychosocial Response Interventions   Plan Of Care Reviewed With patient   Outcome Evaluation   Outcome Summary/Follow up Plan Pt ambulated 90 feet with MinAx2 and RW. PT will follow to increase strength, improve balance, and progress functional mobility. Recommend rehab at discharge         Problem: Inpatient Physical Therapy  Goal: Bed Mobility Goal LTG- PT  Outcome: Ongoing (interventions implemented as appropriate)    06/19/17 1606   Bed Mobility PT LTG   Bed Mobility PT LTG, Date Established 06/19/17   Bed Mobility PT LTG, Time to Achieve 1 wk   Bed Mobility PT LTG, Activity Type supine to sit/sit to supine   Bed Mobility PT LTG, [place] Level independent       Goal: Transfer Training Goal 1 LTG- PT  Outcome: Ongoing (interventions implemented as appropriate)    06/19/17 1606   Transfer Training PT LTG   Transfer Training PT LTG, Date Established 06/19/17   Transfer Training PT LTG, Time to Achieve 1 wk   Transfer Training PT LTG, Activity Type sit to stand/stand to sit   Transfer Training PT LTG, [place] Level supervision required   Transfer Training PT LTG, Assist Device walker, rolling       Goal: Gait Training Goal LTG- PT  Outcome: Ongoing (interventions implemented as appropriate)    06/19/17 1606   Gait Training PT LTG   Gait Training Goal PT LTG, Date Established 06/19/17   Gait Training Goal PT LTG, Time to Achieve 1 wk   Gait Training Goal PT LTG, [place] Level supervision required   Gait Training Goal PT LTG, Assist Device walker, rolling   Gait Training Goal PT LTG, Distance to Achieve 350 feet       Goal: Stair Training Goal LTG- PT  Outcome: Ongoing (interventions implemented as appropriate)    06/19/17 1606   Stair Training PT LTG   Stair Training Goal PT LTG, Date Established 06/19/17   Stair Training Goal PT LTG, Time to Achieve  1 wk   Stair Training Goal PT LTG, Number of Steps 4   Stair Training Goal PT LTG, [place] Level supervision required   Stair Training Goal PT LTG, Assist Device 2 handrails

## 2017-06-20 PROCEDURE — 92611 MOTION FLUOROSCOPY/SWALLOW: CPT

## 2017-06-20 PROCEDURE — 97535 SELF CARE MNGMENT TRAINING: CPT

## 2017-06-20 PROCEDURE — 99231 SBSQ HOSP IP/OBS SF/LOW 25: CPT | Performed by: NURSE PRACTITIONER

## 2017-06-20 PROCEDURE — 97116 GAIT TRAINING THERAPY: CPT

## 2017-06-20 RX ORDER — LEVETIRACETAM 100 MG/ML
500 SOLUTION ORAL EVERY 12 HOURS SCHEDULED
Status: DISCONTINUED | OUTPATIENT
Start: 2017-06-20 | End: 2017-06-21 | Stop reason: HOSPADM

## 2017-06-20 RX ORDER — LEVETIRACETAM 500 MG/1
500 TABLET ORAL EVERY 12 HOURS SCHEDULED
Status: DISCONTINUED | OUTPATIENT
Start: 2017-06-20 | End: 2017-06-20

## 2017-06-20 RX ADMIN — APIXABAN 2.5 MG: 2.5 TABLET, FILM COATED ORAL at 20:52

## 2017-06-20 RX ADMIN — QUETIAPINE FUMARATE 25 MG: 25 TABLET, FILM COATED ORAL at 10:21

## 2017-06-20 RX ADMIN — DORZOLAMIDE HYDROCHLORIDE 1 DROP: 20 SOLUTION/ DROPS OPHTHALMIC at 10:20

## 2017-06-20 RX ADMIN — QUETIAPINE FUMARATE 25 MG: 25 TABLET, FILM COATED ORAL at 20:52

## 2017-06-20 RX ADMIN — DORZOLAMIDE HYDROCHLORIDE 1 DROP: 20 SOLUTION/ DROPS OPHTHALMIC at 18:50

## 2017-06-20 RX ADMIN — LEVETIRACETAM 500 MG: 100 SOLUTION ORAL at 20:55

## 2017-06-20 RX ADMIN — TIMOLOL MALEATE 1 DROP: 2.5 SOLUTION/ DROPS OPHTHALMIC at 10:20

## 2017-06-20 RX ADMIN — MEMANTINE 5 MG: 10 TABLET ORAL at 10:21

## 2017-06-20 RX ADMIN — LEVETIRACETAM 500 MG: 500 TABLET ORAL at 10:23

## 2017-06-20 RX ADMIN — LATANOPROST 1 DROP: 50 SOLUTION/ DROPS OPHTHALMIC at 20:53

## 2017-06-20 RX ADMIN — MEMANTINE 5 MG: 10 TABLET ORAL at 20:52

## 2017-06-20 RX ADMIN — APIXABAN 2.5 MG: 2.5 TABLET, FILM COATED ORAL at 10:20

## 2017-06-20 NOTE — MBS/VFSS/FEES
Acute Care - Speech Language Pathology   Swallow Initial Evaluation [facility]     Patient Name: Paul Lyons  : 1927  MRN: [id number]  Today's Date: 2017  Onset of Illness/Injury or Date of Surgery Date: 17            Admit Date: 2017    Visit Dx:     ICD-10-CM ICD-9-CM   1. Impaired functional mobility, balance, gait, and endurance Z74.09 V49.89   2. Inguinal hernia K40.90 550.90   3. Oropharyngeal dysphagia R13.12 787.22     Patient Active Problem List   Diagnosis   • Lung nodule   • CAD (coronary artery disease)   • Chronic atrial fibrillation   • Essential hypertension   • Hyperlipemia   • Shoulder bursitis   • Seizure disorder   • Late onset Alzheimer's disease without behavioral disturbance   • History of skin cancer   • BPH (benign prostatic hyperplasia)   • Glaucoma   • Hiatal hernia with GERD   • LEE on CPAP   • Bradycardia   • Ataxia   • Weakness   • GERD (gastroesophageal reflux disease)   • Incarcerated left inguinal hernia   • Chronic kidney disease   • Severe malnutrition     Past Medical History:   Diagnosis Date   • Abnormal CT scan, lung     PULMONARY NODULE (<6CM)   • Atrial fibrillation    • BPH (benign prostatic hyperplasia) 2017   • BPH (benign prostatic hypertrophy)    • Bradycardia 2017   • Cardiac disorder    • Cataract     STATUS POST REMOVAL WITH LENS IMPLANT   • Coronary artery disease    • Esophageal stricture     WTIH NOTED DILATION   • GERD (gastroesophageal reflux disease)    • Glaucoma    • Glaucoma    • H/O inguinal hernia repair    • Hiatal hernia    • Hiatal hernia with GERD 2017   • Hypercholesteremia    • Hypertension    • Malignant neoplasm    • Obstructive sleep apnea on CPAP    • LEE on CPAP 2017   • Paroxysmal atrial fibrillation    • Peripheral vascular disease    • Right upper lobe pneumonia    • Seizure disorder    • Skin cancer     WITH REMOVAL   • TIA (transient ischemic attack) 2013    Carotid duplex 50% right ICA.      Past Surgical History:   Procedure Laterality Date   • BACK SURGERY     • CATARACT EXTRACTION WITH INTRAOCULAR LENS IMPLANT     • CERVICAL LAMINECTOMY     • CORONARY ARTERY BYPASS GRAFT  04/15/2002    x4   • ESOPHAGEAL DILATATION     • HERNIA REPAIR     • INGUINAL HERNIA REPAIR Left 6/15/2017    Procedure: LEFT INGUINAL HERNIA REPAIR;  Surgeon: Riaz Grey MD;  Location: [facility];  Service:    • LUMBAR LAMINECTOMY  02/2012   • SKIN CANCER EXCISION            SWALLOW EVALUATION (last 72 hours)      Swallow Evaluation       06/19/17 1400 06/17/17 1130             Rehab Evaluation    Document Type evaluation  -SG evaluation  -JW       Subjective Information no complaints  -SG --   confused  -JW       Patient Effort, Rehab Treatment good  -SG good  -JW       Symptoms Noted During/After Treatment none  -SG none  -JW       General Information    Patient Profile Review yes  -SG yes  -JW       Subjective Patient Observations alert, confused  -SG alert but confused.   -JW       Pertinent History Of Current Problem adm w/ dementia and h/o aspiration  -SG dementia, h/o silent aspiration, hernia  -JW       Current Diet Limitations NPO  -SG regular solid  -JW       Precautions/Limitations, Vision WFL  -SG        Precautions/Limitations, Hearing WFL  -SG        Prior Level of Function- Communication functional for ADL's with assistance  -SG --   impaired d/t cognition  -JW       Prior Level of Function- Swallowing no diet consistency restrictions  -SG no diet consistency restrictions  -JW       Plans/Goals Discussed With patient and family;agreed upon  -SG patient and family  -JW       Barriers to Rehab cognitive status  -SG cognitive status;previous functional deficit  -JW       Videofluoroscopic Swallowing Exam    Risks/Benefits Reviewed risks/benefits explained;agreed to eval;patient;family   wife present for MBS  -SG        Positioning Needs for Swallow Exam upright at 90 for exam  -SG        Radiologic Views Used  for Examination left lateral view  -SG        Motor Function During Phonation/Speech    Observation Anatomic Considerations C-spine fusion  -SG        VFSS    Mode of Presentation thin:;nectar:;pudding:;mixed:;cohesive solid:;cup;spoon;straw  -SG        Oral Phase Results increased prep time;incomplete bolus preparation   with solid item  -SG        Oral Transit Impairment unable to coordinate with piecemeal oral transit of large bolus  -SG        Pharyngeal Phase Physiologic Impairment impaired timing with penetration before;aspiration after from residue  -SG        Post Swallow Residue other (see comments)   vestibular residue w/ penetration  -SG        Rosenbek's PenAsp Scale 8-->Level 8  -SG        Response to Aspiration absent response, silent aspiration  -SG        Att Compensatory Maneuvers bolus size;bolus presentation style;chin down position;effortful (hard) swallow  -SG        Pharyngeal Phase Results impaired pharyngeal phase of swallowing  -SG        Summary of VFSS MBS compelted. Pt presented w/ moderate oropharyngeal dysphagia. Inc'd prep time w/ solid 2' ltd dentition. No aspiration with pureed or NT trials via tsp and cup. Deep penetration during the swallow with thins via tsp, cup and straw and NT via straw. Residue coated into vestibule as study pregressed and resulted in aspiration after the swallow. All aspiration was silent. Moderate-severe valleculae and pys residue w/ all which majoiry cleared w/ subsequent swallows and liquid washes of NT. However, at the end of exam with solid item, deep penetration resulted 2' pys and valleculae residue accumulating then spilling into vestibule w/ subsequent swallows (non-cohesive texture and inadequate masication of solid caused this)-- trace amounts fell to TVC level. With cued cough, pt cleared. Rec: L3, NT, no straws, dys tx, con't dys tx at home w/ HH at d/c. SLP to f/u for further education w/ pt and wife.   -SG        VFSS Swallow Recommendations     Eating Assistance requires caregiver assistance for eating;needs constant supervision during self eating activity  -SG        Oral Care oral care with toothbrush and dentifrice BID and PRN  -SG        Modified Eating Strategies upright positioning 90 degrees;alternate food and liquid swallows  -        Other Recommendations puree with some mashed;nectar thick   no straws  -SG        Fiberoptic Endoscopic Swallowing Exam    Risks/Benefits Reviewed  risks/benefits explained;agreed to eval;patient;family  -       Nasal Entry, Topical Anesthetic  left:;nostril entered using no topical anesthetic  -       Anatomy and Physiology    Velopharyngeal  WFL  -       Base of Tongue  symmetrical  -       Epiglottis  rests posteriorly  -       Laryngeal Function Breathing  CNA  -       Laryngeal Function Phonation  CNA  -       Laryngeal Function Breath Hold  CNA  -       Secretions  0- Normal, no visible secretions  -       Secretion Description  thin;clear  -JW       Ice Chips  DNA  -       Spontaneous Swallow  frequency reduced  -       Sensory  reduced sensation  -       Oral-Phary Transit  WFL   for limited trials presented  -       Anatomy Hypopharynx    Observation Anatomic Considerations  no anatomic structural deviation via FEES  -       FEES    Mode of Presentation  thin:;nectar:;pudding:;fed by clinician;spoon  -       Pharyngeal Phase Impairment  all consistencies:;bolus to pyriforms before initiation of response;reduced pharyngeal wall contraction;tongue base retraction reduced;reduced laryngeal elevation;reduced anterior hyolaryngeal excursion;reduced closure vestibule;aspiration after from residue  -JW       Post Swallow Residue  all consistencies:;residue present pyriform sinuses;residue present on pharyngeal walls;unable to clear residue in pyriform sinuses;post swallow residue present;residue present in valleculae  -JW       Rosenbek's Scale  8-->Level 8  -       Response to  Aspiration  absent response, silent aspiration  -       Pharyngeal Phase Results  impaired pharyngeal phase of swallowing  -       FEES Summary  FEES performed d/t h/o silent aspiration. PO trials of thin, NTL and pudding via tsp only. Visualization difficult due to hypopharynx contraction and  posterior resting epiglottis. Delayed pharyngeal initiation of swallow at least to pyriforms was visualized, unable to r/o aspiration d/t delay, across consistencies. Decreased base of tongue retraction, posterior pharyngeal wall squeeze, and decreased hyolaryngeal elevation and excursion resulting in diffuse moderate residue. Decreased vestibular closure during the swallow resulting in penetration at least to the true vocal folds, unable to r/o aspiration during the swallow d/t limited view. Residue was visualized post-swallow in vestibule and on arytenoids, spilling into airway ultimately resulting in aspiration across consistencies. Recommend NPO with oral care BID and PRN, alternate means of nutrtion at MD discretion, meds alternate route, re-eval Monday.  -       FEES Swallow Recommendations    Oral Care  oral care with toothbrush and dentifrice BID and PRN  -       Recommended Diet  NPO: unsafe for food/liquid intake;temporary alternative means of nutrition/hydration  -       Ther Interventions Swallowing Comment  re-assess monday via FEES or MBS d/t silent aspiration  -       Improve timing of pharyngeal response    To improve timing of pharyngeal response, patient will: Swallow in timely way using super-supraglottic swallow;Swallow in timely way using Mendelsohn maneuver;80%;with consistent cues  -SG        Status: Improve timing of pharyngeal response New  -SG        Timing of Pharyngeal Response Progress continue to adress  -SG        Improve closure at entrance to airway    To improve closure at entrance to airway, patient will: Complete super-supraglottic swallow;80%;with consistent cues  -SG         Status: Improve closure at entrance to airway New  -SG        Closure at Entrance to Airway Progress continue to adress  -SG        Improve laryngeal elevation    To improve laryngeal elevation, patient will: Complete super-supraglottic swallow;80%;with consistent cues  -SG        Status: Improve laryngeal elevation New  -SG        Laryngeal Elevation Progress continue to adress  -SG        Improve tongue base & pharyngeal wall squeeze    To improve tongue base & pharyngeal wall squeeze, patient will: Complete effortful swallow;Complete tongue hold swallow;80%;with consistent cues  -SG        Status: Improve tongue base & pharyngeal wall squeeze New  -SG        Tongue Base/Pharyngeal Wall Squeeze Progress continue to adress  -SG          User Key  (r) = Recorded By, (t) = Taken By, (c) = Cosigned By    Initials Name Effective Dates    SG Tejal Randa Harden, MS CCC-SLP 06/22/15 -     ADRIANNA Melendez, MS CCC-SLP 08/11/15 -         EDUCATION  The patient has been educated in the following areas:   Dysphagia (Swallowing Impairment) Oral Care/Hydration Modified Diet Instruction.    SLP Recommendation and Plan                                           Plan of Care Review  Plan Of Care Reviewed With: patient  Progress:  (MBS)  Outcome Summary/Follow up Plan: MBS compelted. Pt presented w/ moderate oropharyngeal dysphagia. Inc'd prep time w/ solid 2' ltd dentition. No aspiration with pureed or NT trials via tsp and cup. Deep penetration during the swallow with thins via tsp, cup and straw and NT via straw. Residue coated into vestibule as study pregressed and resulted in aspiration after the swallow. All aspiration was silent. Moderate-severe valleculae and pys residue w/ all which majoiry cleared w/ subsequent swallows and liquid washes of NT. However, at the end of exam with solid item, deep penetration resulted 2' pys and valleculae residue accumulating then spilling into vestibule w/ subsequent  swallows (non-cohesive texture and inadequate masication of solid caused this)-- trace amounts fell to TVC level. With cued cough, pt cleared. Rec: L3, NT, no straws, dys tx, con't dys tx at home w/ HH at d/c. SLP to f/u for further education w/ pt and wife.           IP SLP Goals       06/19/17 1636          Safely Consume Diet    Safely Consume Diet- SLP, Date Established 06/19/17  -SG      Safely Consume Diet- SLP, Time to Achieve by discharge  -SG      Safely Consume Diet- SLP, Date Goal Reviewed 06/19/17  -SG      Safely Consume Diet- SLP, Outcome goal ongoing  -SG        User Key  (r) = Recorded By, (t) = Taken By, (c) = Cosigned By    Initials Name Provider Type    EMIR Harden MS CCC-SLP Speech and Language Pathologist               Time Calculation:         Time Calculation- SLP       06/20/17 1033          Time Calculation- SLP    SLP Start Time 1600  -SG      SLP Received On 06/20/17  -SG        User Key  (r) = Recorded By, (t) = Taken By, (c) = Cosigned By    Initials Name Provider Type    EMIR Harden MS CCC-SLP Speech and Language Pathologist          Therapy Charges for Today     Code Description Service Date Service Provider Modifiers Qty    29343104939 HC ST MOTION FLUORO EVAL SWALLOW 6 6/20/2017 Tejal Harden MS CCC-SLP GN 1               MS JAYANT Casas  6/20/2017

## 2017-06-20 NOTE — PROGRESS NOTES
Continued Stay Note   [facility]     Patient Name: Paul Lyons  MRN: [id number]  Today's Date: 6/20/2017    Admit Date: 6/13/2017          Discharge Plan       06/20/17 1119    Case Management/Social Work Plan    Plan Home with [facility]    Additional Comments Spoke with wife, and she requested [facility] health, called and faxed referral               Discharge Codes     None        Expected Discharge Date and Time     Expected Discharge Date Expected Discharge Time    Jun 21, 2017             Carrie Zaragoza RN

## 2017-06-20 NOTE — THERAPY TREATMENT NOTE
Acute Care - Speech Language Pathology   Swallow Treatment Note [facility]     Patient Name: Paul Lyons  : 1927  MRN: [id number]  Today's Date: 2017  Onset of Illness/Injury or Date of Surgery Date: 17            Admit Date: 2017    Visit Dx:      ICD-10-CM ICD-9-CM   1. Oropharyngeal dysphagia R13.12 787.22   2. Inguinal hernia K40.90 550.90   3. Impaired functional mobility, balance, gait, and endurance Z74.09 V49.89   4. Hearing impaired, unspecified laterality H91.90 389.9   5. Incarcerated left inguinal hernia K40.30 550.10   6. Ataxia R27.0 781.3   7. Weakness R53.1 780.79     Patient Active Problem List   Diagnosis   • Lung nodule   • CAD (coronary artery disease)   • Chronic atrial fibrillation   • Essential hypertension   • Hyperlipemia   • Shoulder bursitis   • Seizure disorder   • Late onset Alzheimer's disease without behavioral disturbance   • History of skin cancer   • BPH (benign prostatic hyperplasia)   • Glaucoma   • Hiatal hernia with GERD   • LEE on CPAP   • Bradycardia   • Ataxia   • Weakness   • GERD (gastroesophageal reflux disease)   • Incarcerated left inguinal hernia   • Chronic kidney disease   • Severe malnutrition             Adult Rehabilitation Note       17 1400 17 1054       Rehab Assessment/Intervention    Discipline speech language pathologist  -SG physical therapist  -LM     Document Type other (see comments)   Caregiver Instruction  -SG therapy note (daily note)  -LM     Subjective Information no complaints;agree to therapy  -SG agree to therapy;no complaints  -LM     Patient Effort, Rehab Treatment other (see comments)   pt sleeping, too fatigued for tx  -SG excellent  -LM     Symptoms Noted During/After Treatment fatigue;none  -SG none  -LM     Precautions/Limitations fall precautions  -SG fall precautions;other (see comments)   exit alarm  -LM     Precautions/Limitations, Vision WFL  -SG      Precautions/Limitations, Hearing WFL  -SG       Recorded by [SG] Tejal Harden MS CCC-SLP [LM] Glendy Rodriguez, PT     Pain Assessment    Pain Assessment No/denies pain  -SG No/denies pain  -LM     Recorded by [SG] Tejal Harden MS CCC-SLP [LM] Glendy Rodriguez, PT     Cognitive Assessment/Intervention    Current Cognitive/Communication Assessment  impaired  -LM     Orientation Status  oriented to;person;place;situation;disoriented to;time  -LM     Follows Commands/Answers Questions  100% of the time;able to follow single-step instructions;needs cueing;needs repetition  -LM     Personal Safety  mild impairment;impulsive  -LM     Recorded by  [LM] Glendy Rodriguez, PT     Dysphagia/Swallow Intervention    Dysphagia/Swallow Intervention Pt sleeping, SLP could not get pt awake despite max verbal and tactile cues. RN aware, pt had sleeping meds last night  -SG      Dysphagia/Swallow Caregiver Training Extensive edu reviewed w/ pt's wife who expressed understanding w/ all. Thickener, informational pages, and SLP contact info provided. Reviewed MBS results, aspiration precautions, and edu on risks of feeding pt unless he was fully alert. Wife expressed understanding. Pt may d/c this pm.  SLP for dysphagia has been arranged. Will follow pt until d/c. Wife knows how to contact if any questions arise.   -SG      Recorded by [SG] Tejal Harden MS CCC-SLP      Improve timing of pharyngeal response    To improve timing of pharyngeal response, patient will: Swallow in timely way using super-supraglottic swallow;Swallow in timely way using Mendelsohn maneuver;80%;with consistent cues  -SG      Status: Improve timing of pharyngeal response New  -SG      Timing of Pharyngeal Response Progress continue to adress  -SG      Recorded by [SG] Tejal Hraden MS CCC-SLP      Improve closure at entrance to airway    To improve closure at entrance to airway, patient will: Complete super-supraglottic swallow;80%;with  consistent cues  -SG      Status: Improve closure at entrance to airway New  -SG      Closure at Entrance to Airway Progress continue to adress  -SG      Recorded by [SG] Tejal Harden MS CCC-SLP      Improve laryngeal elevation    To improve laryngeal elevation, patient will: Complete super-supraglottic swallow;80%;with consistent cues  -SG      Status: Improve laryngeal elevation New  -SG      Laryngeal Elevation Progress continue to adress  -SG      Recorded by [SG] Tejal Harden MS CCC-SLP      Improve tongue base & pharyngeal wall squeeze    To improve tongue base & pharyngeal wall squeeze, patient will: Complete effortful swallow;Complete tongue hold swallow;80%;with consistent cues  -SG      Status: Improve tongue base & pharyngeal wall squeeze New  -SG      Tongue Base/Pharyngeal Wall Squeeze Progress continue to adress  -SG      Recorded by [SG] Tejal Harden MS CCC-SLP      Bed Mobility, Assessment/Treatment    Bed Mobility, Assistive Device  bed rails;head of bed elevated  -LM     Bed Mob, Supine to Sit, Vance  supervision required;verbal cues required  -LM     Bed Mob, Sit to Supine, Vance  supervision required;verbal cues required  -LM     Bed Mobility, Impairments  strength decreased  -LM     Bed Mobility, Comment  Verbal cues for use of bedrails to assist with sitting and to place LEs on floor while sitting EOB.  -LM     Recorded by  [LM] Glendy Rodriguez, PT     Transfer Assessment/Treatment    Transfers, Sit-Stand Vance  minimum assist (75% patient effort);verbal cues required  -LM     Transfers, Stand-Sit Vance  minimum assist (75% patient effort);verbal cues required  -LM     Transfers, Sit-Stand-Sit, Assist Device  rolling walker  -LM     Transfer, Safety Issues  step length decreased;weight-shifting ability decreased;impulsivity  -LM     Transfer, Impairments  strength decreased;impaired balance  -LM     Transfer, Comment   Sit-stand/stand-sit x2, verbal cues required with sit-stand to push from bed, then place hands on walker. During stand-sit, verbal cues to step backwards until chair felt on back of legs, use of arm rails during sitting.  -LM     Recorded by  [LM] Glendy Rodriguez, PT     Gait Assessment/Treatment    Gait, Kaufman Level  minimum assist (75% patient effort)  -LM     Gait, Assistive Device  rolling walker  -LM     Gait, Distance (Feet)  72  -LM     Gait, Gait Pattern Analysis  swing-through gait  -LM     Gait, Gait Deviations  andrez decreased;step length decreased;weight-shifting ability decreased;forward flexed posture;narrow base  -LM     Gait, Safety Issues  step length decreased;weight-shifting ability decreased  -LM     Gait, Impairments  strength decreased;impaired balance  -LM     Gait, Comment  Pt demonstrated slow gait speed with narrow base of support and forward flexed posture. Verbal cues for upright posture and remaining within RW for safety. Pt required increased time during turn. Pt limited by fatigue.  -LM     Recorded by  [LM] Glendy Rodriguez PT     Therapy Exercises    Bilateral Lower Extremities  AROM:;15 reps;sitting;ankle pumps/circles;hip flexion;LAQ;other reps   hip adduction squeezes with pillow  -LM     Recorded by  [LM] Glendy Rodriguez, PT     Positioning and Restraints    Pre-Treatment Position  in bed  -LM     Post Treatment Position  chair  -LM     In Bed  notified nsg;call light within reach;exit alarm on;with family/caregiver  -LM     Recorded by  [LM] Glendy Rodriguez PT       User Key  (r) = Recorded By, (t) = Taken By, (c) = Cosigned By    Initials Name Effective Dates    EMIR Harden MS [facility]-SLP 06/22/15 -     LM Glendy Rodriguez, PT 06/09/17 -                   IP SLP Goals       06/20/17 1441 06/19/17 1636       Safely Consume Diet    Safely Consume Diet- SLP, Date Established  06/19/17  -SG     Safely Consume Diet- SLP, Time to Achieve  by discharge  -SG      Safely Consume Diet- SLP, Date Goal Reviewed 06/20/17  -SG 06/19/17  -SG     Safely Consume Diet- SLP, Outcome goal ongoing  -SG goal ongoing  -SG       User Key  (r) = Recorded By, (t) = Taken By, (c) = Cosigned By    Initials Name Provider Type    EMIR Tolentino MS PUJA Harden-JOSE ANGEL Speech and Language Pathologist          EDUCATION  The patient has been educated in the following areas:   Dysphagia (Swallowing Impairment) Oral Care/Hydration Modified Diet Instruction.    SLP Recommendation and Plan                                           Plan of Care Review  Plan Of Care Reviewed With: patient  Progress: progress toward functional goals is gradual  Outcome Summary/Follow up Plan:   Extensive edu reviewed w/ pt's wife who expressed understanding w/ all. Thickener, informational pages, and SLP contact info provided. Reviewed MBS results, aspiration precautions, and edu on risks of feeding pt unless he was fully alert. Wife expressed understanding. Pt may d/c this pm.  SLP for dysphagia has been arranged. Will follow pt until d/c. Wife knows how to contact if any questions arise.            Time Calculation:         Time Calculation- SLP       06/20/17 1442 06/20/17 1033       Time Calculation- SLP    SLP Start Time 1400  -SG 1600  -SG     SLP Received On 06/20/17  -SG 06/20/17  -SG       User Key  (r) = Recorded By, (t) = Taken By, (c) = Cosigned By    Initials Name Provider Type    EMIR Tejal Tolentino MS PUJA Harden-JOSE ANGEL Speech and Language Pathologist          Therapy Charges for Today     Code Description Service Date Service Provider Modifiers Qty    76715256484 HC ST MOTION FLUORO EVAL SWALLOW 6 6/20/2017 Tejal MS JAYANT Banks GN 1    26343548408 HC ST SELF CARE/MGMT/TRAIN EA 15 MIN 6/20/2017 MS JAYANT Casas GN 1                 MS JAYANT Casas  6/20/2017

## 2017-06-20 NOTE — PLAN OF CARE
Problem: Patient Care Overview (Adult)  Goal: Plan of Care Review  Outcome: Ongoing (interventions implemented as appropriate)    06/20/17 1306   Coping/Psychosocial Response Interventions   Plan Of Care Reviewed With patient;spouse   Patient Care Overview   Progress progress toward functional goals as expected   Outcome Evaluation   Outcome Summary/Follow up Plan Pt ambulated 72 ft with Aarti and RW. Will continue to progress with mobility. Pt limited by fatigue.         Problem: Inpatient Physical Therapy  Goal: Bed Mobility Goal LTG- PT  Outcome: Ongoing (interventions implemented as appropriate)    06/19/17 1606 06/20/17 1306   Bed Mobility PT LTG   Bed Mobility PT LTG, Date Established 06/19/17 --    Bed Mobility PT LTG, Time to Achieve 1 wk --    Bed Mobility PT LTG, Activity Type supine to sit/sit to supine --    Bed Mobility PT LTG, [place] Level independent --    Bed Mobility PT LTG, Date Goal Reviewed --  06/20/17   Bed Mobility PT LTG, Outcome --  goal ongoing       Goal: Transfer Training Goal 1 LTG- PT  Outcome: Ongoing (interventions implemented as appropriate)    06/19/17 1606 06/20/17 1306   Transfer Training PT LTG   Transfer Training PT LTG, Date Established 06/19/17 --    Transfer Training PT LTG, Time to Achieve 1 wk --    Transfer Training PT LTG, Activity Type sit to stand/stand to sit --    Transfer Training PT LTG, [place] Level supervision required --    Transfer Training PT LTG, Assist Device walker, rolling --    Transfer Training PT LTG, Date Goal Reviewed --  06/20/17   Transfer Training PT LTG, Outcome --  goal ongoing       Goal: Gait Training Goal LTG- PT  Outcome: Ongoing (interventions implemented as appropriate)    06/19/17 1606 06/20/17 1306   Gait Training PT LTG   Gait Training Goal PT LTG, Date Established 06/19/17 --    Gait Training Goal PT LTG, Time to Achieve 1 wk --    Gait Training Goal PT LTG, [place] Level supervision required --    Gait Training Goal PT  LTG, Assist Device walker, rolling --    Gait Training Goal PT LTG, Distance to Achieve 350 feet --    Gait Training Goal PT LTG, Date Goal Reviewed --  06/20/17   Gait Training Goal PT LTG, Outcome --  goal ongoing       Goal: Stair Training Goal LTG- PT  Outcome: Ongoing (interventions implemented as appropriate)    06/19/17 1606 06/20/17 1306   Stair Training PT LTG   Stair Training Goal PT LTG, Date Established 06/19/17 --    Stair Training Goal PT LTG, Time to Achieve 1 wk --    Stair Training Goal PT LTG, Number of Steps 4 --    Stair Training Goal PT LTG, [place] Level supervision required --    Stair Training Goal PT LTG, Assist Device 2 handrails --    Stair Training Goal PT LTG, Date Goal Reviewed --  06/20/17   Stair Training Goal PT LTG, Outcome --  goal ongoing

## 2017-06-20 NOTE — PLAN OF CARE
Problem: Patient Care Overview (Adult)  Goal: Plan of Care Review  Outcome: Ongoing (interventions implemented as appropriate)    06/19/17 2000 06/20/17 0325   Coping/Psychosocial Response Interventions   Plan Of Care Reviewed With patient --    Patient Care Overview   Progress --  improving       Goal: Discharge Needs Assessment  Outcome: Ongoing (interventions implemented as appropriate)    06/13/17 1800 06/13/17 1855 06/19/17 1520   Discharge Needs Assessment   Concerns To Be Addressed --  discharge planning concerns --    Readmission Within The Last 30 Days --  previous discharge plan unsuccessful --    Discharge Disposition --  still a patient --    Living Environment   Transportation Available family or friend will provide --  --    Self-Care   Equipment Currently Used at Home --  --  walker, rolling;cane, straight;shower chair;grab bar  (owns, was not using)       Goal: Adult Individualization and Mutuality  Outcome: Ongoing (interventions implemented as appropriate)    06/13/17 1855   Individualization   Patient Specific Goals increase weight, be able to eat   Patient Specific Interventions bed alarm, waffle mattress         Problem: Fall Risk (Adult)  Goal: Absence of Falls  Outcome: Ongoing (interventions implemented as appropriate)    06/17/17 1720   Fall Risk (Adult)   Absence of Falls making progress toward outcome         Problem: Pressure Ulcer Risk (Rosalino Scale) (Adult,Obstetrics,Pediatric)  Goal: Skin Integrity  Outcome: Ongoing (interventions implemented as appropriate)    06/17/17 1720   Pressure Ulcer Risk (Rosalino Scale) (Adult,Obstetrics,Pediatric)   Skin Integrity making progress toward outcome         Problem: Nutrition, Imbalanced: Inadequate Oral Intake (Adult)  Goal: Improved Oral Intake  Outcome: Ongoing (interventions implemented as appropriate)    06/17/17 1720   Nutrition, Imbalanced: Inadequate Oral Intake (Adult)   Improved Oral Intake making progress toward outcome       Goal:  Prevent Further Weight Loss  Outcome: Ongoing (interventions implemented as appropriate)    06/17/17 1720   Nutrition, Imbalanced: Inadequate Oral Intake (Adult)   Prevent Further Weight Loss making progress toward outcome       Goal: Identify Related Risk Factors and Signs and Symptoms  Outcome: Ongoing (interventions implemented as appropriate)    06/13/17 1855 06/14/17 0450   Nutrition, Imbalanced: Inadequate Oral Intake   Nutrition Imbalanced: Less than Body Requirements: Related Risk Factors appetite decreased;knowledge deficit --    Signs and Symptoms (Nutrition Imbalance, Inadequate Oral Intake: Signs and Symptoms) --  loss of subcutaneous fat;wound healing poor       Goal: Improved Oral Intake  Outcome: Ongoing (interventions implemented as appropriate)    06/17/17 1720   Nutrition, Imbalanced: Inadequate Oral Intake (Adult)   Improved Oral Intake making progress toward outcome       Goal: Prevent Further Weight Loss  Outcome: Ongoing (interventions implemented as appropriate)    06/17/17 1720   Nutrition, Imbalanced: Inadequate Oral Intake (Adult)   Prevent Further Weight Loss making progress toward outcome         Problem: Skin Integrity Impairment, Risk/Actual (Adult)  Goal: Skin Integrity/Wound Healing  Outcome: Ongoing (interventions implemented as appropriate)    06/17/17 1720   Skin Integrity Impairment, Risk/Actual (Adult)   Skin Integrity/Wound Healing making progress toward outcome         Problem: Perioperative Period (Adult)  Goal: Signs and Symptoms of Listed Potential Problems Will be Absent or Manageable (Perioperative Period)  Outcome: Ongoing (interventions implemented as appropriate)    06/15/17 1518   Perioperative Period   Problems Assessed (Perioperative Period) all   Problems Present (Perioperative Period) pain

## 2017-06-20 NOTE — THERAPY TREATMENT NOTE
Acute Care - Physical Therapy Treatment Note  [facility]     Patient Name: Paul Lyons  : 1927  MRN: [id number]  Today's Date: 2017  Onset of Illness/Injury or Date of Surgery Date: 17  Date of Referral to PT: 17  Referring Physician: MD Mackenzie    Admit Date: 2017    Visit Dx:    ICD-10-CM ICD-9-CM   1. Impaired functional mobility, balance, gait, and endurance Z74.09 V49.89   2. Inguinal hernia K40.90 550.90   3. Oropharyngeal dysphagia R13.12 787.22   4. Hearing impaired, unspecified laterality H91.90 389.9   5. Incarcerated left inguinal hernia K40.30 550.10   6. Ataxia R27.0 781.3   7. Weakness R53.1 780.79     Patient Active Problem List   Diagnosis   • Lung nodule   • CAD (coronary artery disease)   • Chronic atrial fibrillation   • Essential hypertension   • Hyperlipemia   • Shoulder bursitis   • Seizure disorder   • Late onset Alzheimer's disease without behavioral disturbance   • History of skin cancer   • BPH (benign prostatic hyperplasia)   • Glaucoma   • Hiatal hernia with GERD   • LEE on CPAP   • Bradycardia   • Ataxia   • Weakness   • GERD (gastroesophageal reflux disease)   • Incarcerated left inguinal hernia   • Chronic kidney disease   • Severe malnutrition               Adult Rehabilitation Note       17 1054          Rehab Assessment/Intervention    Discipline physical therapist  -LM      Document Type therapy note (daily note)  -LM      Subjective Information agree to therapy;no complaints  -LM      Patient Effort, Rehab Treatment excellent  -LM      Symptoms Noted During/After Treatment none  -LM      Precautions/Limitations fall precautions;other (see comments)   exit alarm  -LM      Recorded by [LM] Glendy Rodriguez PT      Pain Assessment    Pain Assessment No/denies pain  -LM      Recorded by [LM] Glendy Rodriguez PT      Cognitive Assessment/Intervention    Current Cognitive/Communication Assessment impaired  -LM      Orientation Status oriented  to;person;place;situation;disoriented to;time  -LM      Follows Commands/Answers Questions 100% of the time;able to follow single-step instructions;needs cueing;needs repetition  -LM      Personal Safety mild impairment;impulsive  -LM      Recorded by [LM] Glendy Rodriguez, PT      Bed Mobility, Assessment/Treatment    Bed Mobility, Assistive Device bed rails;head of bed elevated  -LM      Bed Mob, Supine to Sit, [place] supervision required;verbal cues required  -LM      Bed Mob, Sit to Supine, [place] supervision required;verbal cues required  -LM      Bed Mobility, Impairments strength decreased  -LM      Bed Mobility, Comment Verbal cues for use of bedrails to assist with sitting and to place LEs on floor while sitting EOB.  -LM      Recorded by [LM] Glendy Rodriguez, PT      Transfer Assessment/Treatment    Transfers, Sit-Stand [place] minimum assist (75% patient effort);verbal cues required  -LM      Transfers, Stand-Sit [place] minimum assist (75% patient effort);verbal cues required  -LM      Transfers, Sit-Stand-Sit, Assist Device rolling walker  -LM      Transfer, Safety Issues step length decreased;weight-shifting ability decreased;impulsivity  -LM      Transfer, Impairments strength decreased;impaired balance  -LM      Transfer, Comment Sit-stand/stand-sit x2, verbal cues required with sit-stand to push from bed, then place hands on walker. During stand-sit, verbal cues to step backwards until chair felt on back of legs, use of arm rails during sitting.  -LM      Recorded by [LM] Glendy Rodriguez, PT      Gait Assessment/Treatment    Gait, [place] Level minimum assist (75% patient effort)  -LM      Gait, Assistive Device rolling walker  -LM      Gait, Distance (Feet) 72  -LM      Gait, Gait Pattern Analysis swing-through gait  -LM      Gait, Gait Deviations andrez decreased;step length decreased;weight-shifting ability decreased;forward flexed posture;narrow base  -LM      Gait,  Safety Issues step length decreased;weight-shifting ability decreased  -LM      Gait, Impairments strength decreased;impaired balance  -LM      Gait, Comment Pt demonstrated slow gait speed with narrow base of support and forward flexed posture. Verbal cues for upright posture and remaining within RW for safety. Pt required increased time during turn. Pt limited by fatigue.  -LM      Recorded by [LM] Glendy Rodriguez PT      Therapy Exercises    Bilateral Lower Extremities AROM:;15 reps;sitting;ankle pumps/circles;hip flexion;LAQ;other reps   hip adduction squeezes with pillow  -LM      Recorded by [LM] Glendy Rodriguez PT      Positioning and Restraints    Pre-Treatment Position in bed  -LM      Post Treatment Position chair  -LM      In Bed notified nsg;call light within reach;exit alarm on;with family/caregiver  -LM      Recorded by [RACHEL] Glendy Rodriguez PT        User Key  (r) = Recorded By, (t) = Taken By, (c) = Cosigned By    Initials Name Effective Dates    LM Glendy Rodriguez PT 06/09/17 -                 IP PT Goals       06/20/17 1306 06/19/17 1606       Bed Mobility PT LTG    Bed Mobility PT LTG, Date Established  06/19/17  -MJ     Bed Mobility PT LTG, Time to Achieve  1 wk  -MJ     Bed Mobility PT LTG, Activity Type  supine to sit/sit to supine  -MJ     Bed Mobility PT LTG, [place] Level  independent  -MJ     Bed Mobility PT LTG, Date Goal Reviewed 06/20/17  -LM      Bed Mobility PT LTG, Outcome goal ongoing  -LM      Transfer Training PT LTG    Transfer Training PT LTG, Date Established  06/19/17  -MJ     Transfer Training PT LTG, Time to Achieve  1 wk  -MJ     Transfer Training PT LTG, Activity Type  sit to stand/stand to sit  -MJ     Transfer Training PT LTG, [place] Level  supervision required  -MJ     Transfer Training PT LTG, Assist Device  walker, rolling  -MJ     Transfer Training PT  LTG, Date Goal Reviewed 06/20/17  -LM      Transfer Training PT LTG, Outcome goal ongoing  -LM       Gait Training PT LTG    Gait Training Goal PT LTG, Date Established  06/19/17  -MJ     Gait Training Goal PT LTG, Time to Achieve  1 wk  -MJ     Gait Training Goal PT LTG, [place] Level  supervision required  -MJ     Gait Training Goal PT LTG, Assist Device  walker, rolling  -MJ     Gait Training Goal PT LTG, Distance to Achieve  350 feet  -MJ     Gait Training Goal PT LTG, Date Goal Reviewed 06/20/17  -LM      Gait Training Goal PT LTG, Outcome goal ongoing  -LM      Stair Training PT LTG    Stair Training Goal PT LTG, Date Established  06/19/17  -MJ     Stair Training Goal PT LTG, Time to Achieve  1 wk  -MJ     Stair Training Goal PT LTG, Number of Steps  4  -MJ     Stair Training Goal PT LTG, [place] Level  supervision required  -MJ     Stair Training Goal PT LTG, Assist Device  2 handrails  -MJ     Stair Training Goal PT LTG, Date Goal Reviewed 06/20/17  -LM      Stair Training Goal PT LTG, Outcome goal ongoing  -LM        User Key  (r) = Recorded By, (t) = Taken By, (c) = Cosigned By    Initials Name Provider Type    EDE Hoover, PT Physical Therapist    RACHEL Rodriguez, PT Physical Therapist          Physical Therapy Education     Title: PT OT SLP Therapies (Active)     Topic: Physical Therapy (Done)     Point: Mobility training (Done)    Learning Progress Summary    Learner Readiness Method Response Comment Documented by Status   Patient Acceptance POLO MENDIOLA Reviewed HEP, benefits of activity.  06/20/17 1305 Done    Acceptance DMARLENA,NR   06/19/17 1603 Done   Significant Other Acceptance POLO MENDIOLA Reviewed HEP, benefits of activity.  06/20/17 1305 Done               Point: Home exercise program (Done)    Learning Progress Summary    Learner Readiness Method Response Comment Documented by Status   Patient Acceptance POLO MENDIOLA Reviewed HEP, benefits of activity.  06/20/17 1305 Done   Significant Other Acceptance EPOLO Reviewed HEP, benefits of activity.  06/20/17 1305 Done               Point:  Body mechanics (Done)    Learning Progress Summary    Learner Readiness Method Response Comment Documented by Status   Patient Acceptance E,D VU Reviewed HEP, benefits of activity.  06/20/17 1305 Done    Acceptance D,E VU,NR   06/19/17 1603 Done   Significant Other Acceptance E,D VU Reviewed HEP, benefits of activity.  06/20/17 1305 Done               Point: Precautions (Done)    Learning Progress Summary    Learner Readiness Method Response Comment Documented by Status   Patient Acceptance E,D VU Reviewed HEP, benefits of activity.  06/20/17 1305 Done    Acceptance D,E VU,NR   06/19/17 1603 Done   Significant Other Acceptance E,D VU Reviewed HEP, benefits of activity.  06/20/17 1305 Done                      User Key     Initials Effective Dates Name Provider Type Discipline     03/14/16 -  Nguyễn Hoover, PT Physical Therapist PT     06/09/17 -  Glendy Rodriguez, PT Physical Therapist PT                    PT Recommendation and Plan  Anticipated Discharge Disposition: skilled nursing facility  Planned Therapy Interventions: balance training, bed mobility training, gait training, home exercise program, patient/family education, strengthening, transfer training  PT Frequency: daily  Plan of Care Review  Plan Of Care Reviewed With: patient, spouse  Progress: progress toward functional goals as expected  Outcome Summary/Follow up Plan: Pt ambulated 72 ft with Aarti and RW. Will continue to progress with mobility. Pt limited by fatigue.          Outcome Measures       06/20/17 1054 06/19/17 1520       How much help from another person do you currently need...    Turning from your back to your side while in flat bed without using bedrails? 3  -LM 3  -MJ     Moving from lying on back to sitting on the side of a flat bed without bedrails? 3  -LM 3  -MJ     Moving to and from a bed to a chair (including a wheelchair)? 3  -LM 3  -MJ     Standing up from a chair using your arms (e.g., wheelchair, bedside chair)? 3   -LM 3  -MJ     Climbing 3-5 steps with a railing? 2  -LM 2  -MJ     To walk in hospital room? 3  -LM 2  -MJ     AM-PAC 6 Clicks Score 17  -LM 16  -MJ     Functional Assessment    Outcome Measure Options AM-PAC 6 Clicks Basic Mobility (PT)  -LM AM-PAC 6 Clicks Basic Mobility (PT)  -MJ       User Key  (r) = Recorded By, (t) = Taken By, (c) = Cosigned By    Initials Name Provider Type    EDE Hoover, PT Physical Therapist    LM Glendy Rodriguez PT Physical Therapist           Time Calculation:         PT Charges       06/20/17 1310          Time Calculation    Start Time 1054  -LM      PT Received On 06/20/17  -LM      PT Goal Re-Cert Due Date 06/29/17  -LM      Time Calculation- PT    Total Timed Code Minutes- PT 19 minute(s)  -LM        User Key  (r) = Recorded By, (t) = Taken By, (c) = Cosigned By    Initials Name Provider Type    RACHEL Rodriguez PT Physical Therapist          Therapy Charges for Today     Code Description Service Date Service Provider Modifiers Qty    18113172705 HC GAIT TRAINING EA 15 MIN 6/20/2017 Glendy Rodriguez, PT GP 1          PT G-Codes  Outcome Measure Options: AM-PAC 6 Clicks Basic Mobility (PT)    Glendy Rodriguez PT  6/20/2017

## 2017-06-20 NOTE — DISCHARGE INSTR - OTHER ORDERS
Home health services have been arranged with [facility], they can be reached at #[phone], please call if they have not contacted you within 48 hours of discharge       If you have any questions or concerna after your discharge, please call the [facility] Call Center at #[phone], 24 hours a day/ 7 days a week

## 2017-06-20 NOTE — DISCHARGE PLACEMENT REQUEST
"Mila Lyons (89 y.o. Male)       Date of Birth Social Security Number Address Home Phone MRN    [id number]  [id number] [address] [phone] [id number]    Sabianist Marital Status          Restorationist        Admission Date Admission Type Admitting Provider Attending Provider Department, Room/Bed    17 Urgent Le Mann MD Mini, Jocelyn, MD [facility], S548/1    Discharge Date Discharge Disposition Discharge Destination                      Attending Provider: Le Mann MD     Allergies:  Bee Venom, Lipitor [Atorvastatin]    Isolation:  None   Infection:  None   Code Status:  FULL    Ht:  67\" (170.2 cm)   Wt:  102 lb (46.3 kg)    Admission Cmt:  None   Principal Problem:  Incarcerated left inguinal hernia [K40.30]                 Active Insurance as of 2017     Primary Coverage     Payor Plan Insurance Group Employer/Plan Group    MEDICARE MEDICARE A & B      Payor Plan Address Payor Plan Phone Number Effective From Effective To    [address] [phone] 1992     [place] [address]       Subscriber Name Subscriber Birth Date Member ID       MILA LYONS 1927 [id number]           Secondary Coverage     Payor Plan Insurance Group Employer/Plan Group    [facility] [id number]     Payor Plan Address Payor Plan Phone Number Effective From Effective To    [address] [address] [phone] 2005     [place] [address]       Subscriber Name Subscriber Birth Date Member ID       MILA LYONS 1927 [id number]                 Emergency Contacts      (Rel.) Home Phone Work Phone Mobile Phone    Peggy Lyons (Spouse) [phone] -- [phone]          [facility]  [address] [address]  Phone: [phone]  Fax:  Date: 2017      Ambulatory Referral to Home Health     Patient: Mila Lyons MRN: [id number]   [address]  [place] [address] : " 12/24/1927  SSN:    Phone: [phone] Sex: M      INSURANCE PAYOR PLAN GROUP # SUBSCRIBER ID   Primary:  Secondary: MEDICARE [employer] [id number]  [id number]    [id number]  [id number]      Referring Provider Information:  SIRISHA BRAY Phone: [phone] Fax:       Referral Information:   # Visits: 1 Referral Type: Home Health [42]   Urgency: Routine Referral Reason: Specialty Services Required   Start Date: Jun 20, 2017 End Date: To be determined by Insurer   Diagnosis: Impaired functional mobility, balance, gait, and endurance (Z74.09 [ICD-10-CM] V49.89 [ICD-9-CM])  Oropharyngeal dysphagia (R13.12 [ICD-10-CM] 787.22 [ICD-9-CM])  Incarcerated left inguinal hernia (K40.30 [ICD-10-CM] 550.10 [ICD-9-CM])  Ataxia (R27.0 [ICD-10-CM] 781.3 [ICD-9-CM])  Weakness (R53.1 [ICD-10-CM] 780.79 [ICD-9-CM])      Refer to Dept:   Refer to Provider:   Refer to Facility:      Face to Face Visit Date: 6/20/2017  Follow-up Provider for Plan of Care? I treated the patient in an acute care facility and will not continue treatment after discharge.  Follow-up Provider: TRACIE STOVER [[id number]]  Reason/Clinical Findings: generalized weakness, dysphagia  Describe mobility limitations that make leaving home difficult: generalized weakness, dysphagia  Nursing/Therapeutic Services Requested: Skilled Nursing  Nursing/Therapeutic Services Requested: Physical Therapy  Nursing/Therapeutic Services Requested: Occupational Therapy  Nursing/Therapeutic Services Requested: Speech Therapy  Skilled nursing orders: Medication education  Skilled nursing orders: Cardiopulmonary assessments  PT orders: Therapeutic exercise  PT orders: Gait Training  PT orders: Transfer training  PT orders: Strengthening  PT orders: Home safety assessment  Weight Bearing Status: Full Weight Bearing  Occupational orders: Activities of daily living  Occupational orders: Energy conservation  Occupational orders: Strengthening  Occupational orders:  Cognition  Occupational orders: Fine motor  Occupational orders: Home safety assessment  SLP orders: Dysphagia therapy     This document serves as a request of services and does not constitute Insurance authorization or approval of services.  To determine eligibility, please contact the members Insurance carrier to verify and review coverage.     If you have medical questions regarding this request for services. Please contact [address] at [phone] between the hours of 8:00am - 5:00pm (Mon-Fri).             Verbal Order Mode: Per protocol: cosign required  Authorizing Provider: Le Mann MD  Authorizing Provider's NPI: [id number]     Order Entered By: Carrie Zraagoza RN 6/20/2017 11:15 AM     Electronically signed by:                 History & Physical      Lydia ARRINGTON MD at 6/13/2017  3:32 PM                HOSPITAL MEDICINE HISTORY AND PHYSICAL    PCP: Blake Gomez MD    Chief Complaint: nausea, vomiting    Subjective     History of Present Illness  Mr. Lyons is an 89 yo M who was just discharged from [facility] yesterday after admission for incarcerated left inguinal hernia. This was reduced easily by Dr. Fontaine (Surgery) and no surgery was indicated, and so he was discharged home. Last night he started to have similar symptoms of nausea and vomiting again, prompting his family to take him to the ER in [place], where a CT abd/pelvis without contrast showed a partial obstruction due to an incarcerated left inguinal hernia. Dr. Grey was contacted and accepted patient for transfer, hospitalist service will be admitting. Patient is on Eliquis for chronic Afib and last dose was last evening. Currently patient denies any pain, nausea, or vomiting. Family member at bedside and says he has dementia and is a poor historian.     Review of Systems   Otherwise complete ROS is negative except as mentioned in the HPI.    Past Medical History:   Past Medical History:   Diagnosis Date   • Abnormal CT  scan, lung     PULMONARY NODULE (<6CM)   • Atrial fibrillation    • BPH (benign prostatic hyperplasia) 4/26/2017   • BPH (benign prostatic hypertrophy)    • Bradycardia 4/26/2017   • Cardiac disorder    • Cataract     STATUS POST REMOVAL WITH LENS IMPLANT   • Coronary artery disease    • Esophageal stricture     WTIH NOTED DILATION   • GERD (gastroesophageal reflux disease)    • Glaucoma    • Glaucoma    • H/O inguinal hernia repair    • Hiatal hernia    • Hiatal hernia with GERD 4/26/2017   • Hypercholesteremia    • Hypertension    • Malignant neoplasm    • Obstructive sleep apnea on CPAP    • LEE on CPAP 4/26/2017   • Paroxysmal atrial fibrillation    • Peripheral vascular disease    • Right upper lobe pneumonia    • Seizure disorder    • Skin cancer     WITH REMOVAL   • TIA (transient ischemic attack) 02/2013    Carotid duplex 50% right ICA.       Past Surgical History:  Past Surgical History:   Procedure Laterality Date   • BACK SURGERY     • CATARACT EXTRACTION WITH INTRAOCULAR LENS IMPLANT     • CERVICAL LAMINECTOMY     • CORONARY ARTERY BYPASS GRAFT  04/15/2002    x4   • ESOPHAGEAL DILATATION     • HERNIA REPAIR     • LUMBAR LAMINECTOMY  02/2012   • SKIN CANCER EXCISION         Family History: family history includes Coronary artery disease in his other; Stroke in his other.     Social History:  reports that he has quit smoking. He does not have any smokeless tobacco history on file. He reports that he does not drink alcohol or use illicit drugs.    Medications:  Prescriptions Prior to Admission   Medication Sig Dispense Refill Last Dose   • acetaminophen-codeine (TYLENOL #3) 300-30 MG per tablet Take 1 tablet by mouth Daily As Needed.   Taking   • apixaban (ELIQUIS) 2.5 MG tablet tablet Take 1 tablet by mouth 2 (two) times a day. 60 tablet 11 Taking   • aspirin (ASPIRIN LOW DOSE) 81 MG tablet Take 81 mg by mouth Every Night.   Taking   • dorzolamide (TRUSOPT) 2 % ophthalmic solution Administer 1 drop to the  "right eye 2 (Two) Times a Day.  11 Taking   • EPINEPHrine (EPIPEN 2-NAKITA) 0.3 MG/0.3ML solution auto-injector injection EpiPen 2-Ankita 0.3 MG/0.3ML Injection Solution Auto-injector; Patient Sig: EpiPen 2-Ankita 0.3 MG/0.3ML Injection Solution Auto-injector ; 2; 0; 29-Apr-2014; Active   Taking   • levETIRAcetam (KEPPRA) 500 MG tablet Take 1 tablet by mouth 3 (Three) Times a Day. 270 tablet 3    • lisinopril (PRINIVIL,ZESTRIL) 10 MG tablet Take 20 mg by mouth Daily.   Taking   • memantine (NAMENDA) 5 MG tablet Take 1 tablet by mouth 2 (Two) Times a Day. 180 tablet 3    • pravastatin (PRAVACHOL) 40 MG tablet Take 40 mg by mouth daily.   Taking   • thiamine (VITAMINE B-1) 100 MG tablet Take 100 mg by mouth Daily.   Taking   • timolol (TIMOPTIC) 0.25 % ophthalmic solution Administer 1 drop to the right eye Daily.   Taking   • travoprost, [place] free, (TRAVATAN Z) 0.004 % solution ophthalmic solution Apply 1 drop to eye Every Night.   Taking     Allergies   Allergen Reactions   • Bee Venom    • Lipitor [Atorvastatin] Myalgia       Objective    Physical Exam:   Vital Signs: /61 (BP Location: Right arm, Patient Position: Lying)  Pulse 50  Temp 97.5 °F (36.4 °C) (Oral)   Resp 16  Ht 67\" (170.2 cm)  Wt 102 lb 8 oz (46.5 kg)  SpO2 96%  BMI 16.05 kg/m2  Physical Exam  Temp:  [97.5 °F (36.4 °C)] 97.5 °F (36.4 °C)  Heart Rate:  [50] 50  Resp:  [16] 16  BP: (153-172)/(61-66) 153/61  Constitutional: no acute distress, awake, alert  Eyes: PERRLA, sclerae anicteric, no conjunctival injection  Skin: healing scabs on forehead from recent fall  Neck: supple, no thyromegaly, trachea midline  Respiratory: Clear to auscultation bilaterally, nonlabored respirations   Cardiovascular: RRR, no murmurs, rubs, or gallops, palpable pedal pulses bilaterally  Gastrointestinal: Positive bowel sounds, soft, nontender, nondistended; left inguinal hernia present  Musculoskeletal: No bilateral ankle edema, no clubbing or cyanosis to bilateral lower " extremities  Psychiatric: oriented x 3, mild confusion regarding details of recent events, appropriate affect, cooperative  Neurologic: Strength symmetric in all extremities, Cranial Nerves grossly intact to confrontation     Results Reviewed:  I have personally reviewed current lab, radiology, and data and agree.    Results from last 7 days  Lab Units 06/12/17  0315   WBC 10*3/mm3 6.61   HEMOGLOBIN g/dL 13.4   PLATELETS 10*3/mm3 245       Results from last 7 days  Lab Units 06/12/17  0315   SODIUM mmol/L 139   POTASSIUM mmol/L 3.8   TOTAL CO2 mmol/L 29.0   CREATININE mg/dL 0.90   GLUCOSE mg/dL 99   CALCIUM mg/dL 9.6           Assessment/Plan   Assessment & Plan  Principal Problem:    Incarcerated left inguinal hernia  Active Problems:    Chronic atrial fibrillation    Essential hypertension    Seizure disorder    Late onset Alzheimer's disease without behavioral disturbance    88 yo M with hx of chronic Afib on Eliquis, dementia, HTN, and seizure disorder, presents due to recurrent of incarcerated left inguinal hernia, unable to be reduced at the OSH.     PLAN:  --Labs at OSH reviewed and unremarkable.  --Keep NPO, start gentle fluids.   --Consult General Surgery, Dr. Grey.  --Hold Eliquis in anticipation of surgical intervention.  --Hold oral meds,will switch to IV Keppra.  --Pain meds and antiemetics PRN.      I discussed the patients findings and my recommendations with: patient, spouse    Lydia Payne MD   06/13/17   3:32 PM                 Electronically signed by Lydia ARRINGTON MD at 6/13/2017  3:47 PM        Hospital Medications (active)       Dose Frequency Start End    apixaban (ELIQUIS) tablet 2.5 mg 2.5 mg Every 12 Hours Scheduled 6/18/2017     Sig - Route: Take 1 tablet by mouth Every 12 (Twelve) Hours. - Oral    barium sulfate (VARIBAR PUDDING) oral paste 20 mL 20 mL Once in Imaging 6/19/2017 6/19/2017    Sig - Route: Take 20 mL by mouth Once. - Oral    barium sulfate (VARIBAR THIN LIQUID) oral  "suspension 100 mL 100 mL Once in Imaging 6/19/2017 6/19/2017    Sig - Route: Take 100 mL by mouth Once. - Oral    barium sulfate oral suspension 50 mL 50 mL Once in Imaging 6/19/2017 6/19/2017    Sig - Route: Take 50 mL by mouth Once. - Oral    docusate sodium (COLACE) capsule 100 mg 100 mg 2 Times Daily PRN 6/15/2017     Sig - Route: Take 1 capsule by mouth 2 (Two) Times a Day As Needed for Constipation. - Oral    dorzolamide (TRUSOPT) 2 % ophthalmic solution 1 drop 1 drop 2 Times Daily 6/13/2017     Sig - Route: Administer 1 drop to the right eye 2 (Two) Times a Day. - Right Eye    hydrALAZINE (APRESOLINE) injection 10 mg 10 mg Every 6 Hours PRN 6/13/2017     Sig - Route: Infuse 0.5 mL into a venous catheter Every 6 (Six) Hours As Needed for High Blood Pressure. - Intravenous    latanoprost (XALATAN) 0.005 % ophthalmic solution 1 drop 1 drop Nightly 6/13/2017     Sig - Route: Administer 1 drop to both eyes Every Night. - Both Eyes    levETIRAcetam (KEPPRA) 100 MG/ML solution 500 mg 500 mg Every 12 Hours Scheduled 6/20/2017     Sig - Route: Take 5 mL by mouth Every 12 (Twelve) Hours. - Oral    LORazepam (ATIVAN) injection 0.25 mg 0.25 mg Once 6/17/2017     Sig - Route: Infuse 0.125 mL into a venous catheter 1 (One) Time. - Intravenous    LORazepam (ATIVAN) injection 0.5 mg 0.5 mg Every 6 Hours PRN 6/17/2017 6/23/2017    Sig - Route: Infuse 0.25 mL into a venous catheter Every 6 (Six) Hours As Needed for Agitation. - Intravenous    Magnesium Sulfate 2 gram Bolus, followed by 8 gram infusion (total Mg dose 10 grams)- Mg less than or equal to 1mg/dL 2 g As Needed 6/16/2017     Sig - Route: Infuse 50 mL into a venous catheter As Needed (Mg less than or equal to 1mg/dL). - Intravenous    Linked Group 1:  \"Or\" Linked Group Details        magnesium sulfate 4 gram infusion- Mg 1.6-1.9 mg/dL 4 g As Needed 6/16/2017     Sig - Route: Infuse 100 mL into a venous catheter As Needed (Mg 1.6-1.9 mg/dL). - Intravenous    Linked " "Group 1:  \"Or\" Linked Group Details        Magnesium Sulfate 6 gram Infusion (2 gm x 3) -Mg 1.1 -1.5 mg/dL 2 g As Needed 6/16/2017     Sig - Route: Infuse 50 mL into a venous catheter As Needed (Mg 1.1 -1.5 mg/dL). - Intravenous    Linked Group 1:  \"Or\" Linked Group Details        memantine (NAMENDA) tablet 5 mg 5 mg Every 12 Hours Scheduled 6/16/2017     Sig - Route: Take 0.5 tablets by mouth Every 12 (Twelve) Hours. - Oral    Morphine sulfate (PF) injection 1 mg 1 mg Every 4 Hours PRN 6/13/2017 6/23/2017    Sig - Route: Infuse 0.5 mL into a venous catheter Every 4 (Four) Hours As Needed for Moderate Pain (4-6). - Intravenous    Linked Group 2:  \"And\" Linked Group Details        Morphine sulfate (PF) injection 4 mg 4 mg Every 2 Hours PRN 6/15/2017 6/25/2017    Sig - Route: Infuse 1 mL into a venous catheter Every 2 (Two) Hours As Needed for Moderate Pain (4-6). - Intravenous    Linked Group 3:  \"And\" Linked Group Details        naloxone (NARCAN) injection 0.4 mg 0.4 mg Every 5 Minutes PRN 6/13/2017     Sig - Route: Infuse 1 mL into a venous catheter Every 5 (Five) Minutes As Needed for Respiratory Depression. - Intravenous    Linked Group 2:  \"And\" Linked Group Details        naloxone (NARCAN) injection 0.4 mg 0.4 mg Every 5 Minutes PRN 6/15/2017     Sig - Route: Infuse 1 mL into a venous catheter Every 5 (Five) Minutes As Needed for Respiratory Depression. - Intravenous    Linked Group 3:  \"And\" Linked Group Details        ondansetron (ZOFRAN) injection 4 mg 4 mg Every 6 Hours PRN 6/13/2017     Sig - Route: Infuse 2 mL into a venous catheter Every 6 (Six) Hours As Needed for Nausea or Vomiting. - Intravenous    oxyCODONE-acetaminophen (PERCOCET) 5-325 MG per tablet 1 tablet 1 tablet Every 4 Hours PRN 6/15/2017 6/25/2017    Sig - Route: Take 1 tablet by mouth Every 4 (Four) Hours As Needed for Moderate Pain (4-6). - Oral    potassium chloride (MICRO-K) CR capsule 40 mEq 40 mEq As Needed 6/16/2017     Sig - Route: " "Take 4 capsules by mouth As Needed (potassium replacement.  see admin instructions). - Oral    Linked Group 4:  \"Or\" Linked Group Details        potassium chloride 10 mEq in 100 mL IVPB 10 mEq Every 1 Hour PRN 6/16/2017     Sig - Route: Infuse 100 mL into a venous catheter Every 1 (One) Hour As Needed (potassium protocol PERIPHERAL - see admin instructions). - Intravenous    Linked Group 4:  \"Or\" Linked Group Details        QUEtiapine (SEROquel) tablet 25 mg 25 mg Every 12 Hours Scheduled 6/16/2017     Sig - Route: Take 1 tablet by mouth Every 12 (Twelve) Hours. - Oral    sodium chloride 0.9 % flush 1-10 mL 1-10 mL As Needed 6/13/2017     Sig - Route: Infuse 1-10 mL into a venous catheter As Needed for Line Care. - Intravenous    timolol (TIMOPTIC) 0.25 % ophthalmic solution 1 drop 1 drop Daily 6/13/2017     Sig - Route: Administer 1 drop to the right eye Daily. - Right Eye    lactated ringers infusion (Discontinued) 9 mL/hr Continuous 6/15/2017 6/19/2017    Sig - Route: Infuse 9 mL/hr into a venous catheter Continuous. - Intravenous    levETIRAcetam (KEPPRA) tablet 500 mg (Discontinued) 500 mg Every 12 Hours Scheduled 6/20/2017 6/20/2017    Sig - Route: Take 1 tablet by mouth Every 12 (Twelve) Hours. - Oral    levETIRAcetam in NaCl 0.82% (KEPPRA) IVPB 500 mg (Discontinued) 500 mg Every 12 Hours Scheduled 6/17/2017 6/20/2017    Sig - Route: Infuse 100 mL into a venous catheter Every 12 (Twelve) Hours. - Intravenous    sodium chloride 0.9 % infusion (Discontinued) 75 mL/hr Continuous 6/13/2017 6/19/2017    Sig - Route: Infuse 75 mL/hr into a venous catheter Continuous. - Intravenous             Operative/Procedure Notes (all)      Op Note signed by Riaz Grey MD at 6/19/2017  8:31 AM   Version 1 of 1          DATE OF PROCEDURE:  06/15/2017    PREOPERATIVE DIAGNOSIS: Left inguinal hernia.     POSTOPERATIVE DIAGNOSIS: Left femoral hernia.    PROCEDURE PERFORMED: Repair of a left femoral hernia.     SURGEON: " Riaz Grey MD    ASSISTANT: None.     ANESTHESIA: General with TAP block.     FINDINGS: A small femoral hernia medial to the femoral vessels, intact left inguinal hernia repair with mesh.     ESTIMATED BLOOD LOSS: 20 mL     SPECIMENS: None.     DRAINS: None.     INDICATIONS: The patient is an 89-year-old male who presented to [facility] 5 days ago with small bowel obstruction and a left inguinal hernia. The hernia was reduced in the emergency department and the patient declined surgical repair. He was discharged home subsequently and developed recurrent hernia with small bowel obstruction. The hernia was reduced at a local hospital and he was transferred to [facility] for management. As he was on Eliquis, the surgery was delayed for 2 days. During that time, the hernia did not recur and he was tolerating a clear liquid diet. The procedure was discussed with the patient in detail including benefits and risks.     DESCRIPTION OF PROCEDURE: The patient was identified, brought to the operating room and placed on the operating room table in supine position. After the placement of sequential compression stockings, he was induced with general anesthesia. He received antibiotics prior to incision. A timeout procedure was performed. Anesthesia placed TAP blocks prior to surgery. The abdomen was prepped and draped in a standard fashion. An oblique incision was created between the anterosuperior iliac spine and the pubic tubercle. The subcutaneous tissue was divided with electrocautery. The superficial epigastric was divided between clamps and tied with 3-0 silk suture. The subcutaneous tissue was freed from the external oblique aponeurosis. There was some evidence of scar tissue at this point from his prior repair. The external oblique aponeurosis was opened and divided medially towards the pubic tubercle. There was significant scar tissue within the inguinal canal. The cord structures were  identified and  from the surrounding structures at the level of the pubic tubercle. The cord structures were encircled with a Penrose drain and lifted anteriorly. The mesh then came into view and was very well secured to the pubic tubercle medially, inferior edge of the inguinal ligament inferiorly and the iliopubic tract and lateral structures superiorly. There was no evidence of direct or indirect inguinal hernia. I then suspected that a femoral hernia may be present. The cord structures were  pulled slightly through the internal ring and leaf of peritoneum was identified. This was opened and a finger was inserted into the peritoneal cavity for manual palpation. After completing this, a femoral hernia was identified. The neck of the hernia was relatively small. In order to repair the femoral hernia, the previously placed mesh was divided with scissors overlying the direction that the hernia defect was traveling. The hernia was traveling beneath the inguinal ligament somewhat medial to the femoral vessels. The inguinal ligament was also divided overlying the hernia sac. The hernia sac was then freed from the surrounding structures and was then connected with the previous incision that was placed in the peritoneum. The hernia sac was then freed from surrounding structures and divided. The defect in the peritoneum was then closed with a 3-0 Vicryl suture. The repair of the hernia was performed next. I initially thought that the shelving edge of the inguinal canal could be stretched to reach to cover the defect, but there was decreased laxity secondary to the patient's prior inguinal hernia repair. Therefore, a mesh was placed in the space to cover the femoral canal. This Prolene mesh was anchored to the soft tissue overlying the pubic bone posteriorly, the pubic tubercle medially and the shelving edge of the inguinal ligament anteriorly. The mesh was also attached to the lateral structures surrounding the  femoral vessels. The mesh was then fully secured. The wound was then irrigated. As the mesh was quite secure the decision was made to close the cut that was placed in the mesh as this provided a very adequate repair for his prior inguinal hernia. This was performed using 2-0 Vicryl sutures in interrupted and continuous fashion. Once the mesh was closed, a 1 cm opening was provided at the internal inguinal ring for the cord structures. The cavity was then irrigated a 2nd time. The external oblique aponeurosis was closed using 2-0 Vicryl suture in continuous fashion. Arnie's fascia was closed with a running 3-0 Vicryl suture. The skin was closed using subcuticular Monocryl. The skin was then cleaned and dried and the incision was dressed with Mastisol, Steri-Strips, Telfa and OpSite. The patient was awakened from anesthesia and transferred to the recovery room in stable condition. All needle, instrument, and sponge counts were correct at the end of the case.       RC Canchola/kaiden  DD: 06/15/2017 14:35:14  DT: 06/15/2017 16:14:30  Voice Rec. ID #[id number]  Voice Original ID #[id number]  Doc ID #[id number]  Rev. #0  cc:         Electronically signed by Riaz Grey MD at 6/19/2017  8:31 AM      Riaz Grey MD at 6/15/2017  1:23 PM  Version 1 of 1         INGUINAL HERNIA REPAIR  Procedure Note    Paul Lyons  6/13/2017 - 6/15/2017    Pre-op Diagnosis:   Left inguinal hernia    Post-op Diagnosis:     Femoral hernia    Procedure/CPT® Codes:      Procedure(s):  LEFT femoral HERNIA REPAIR with mesh    Surgeon(s):  Riaz Grey MD    Anesthesia: General    Staff:   Circulator: Joyce Lucio RN; Vicki Ferreira RN  Scrub Person: Aleah Soriano; Kayley Schaefer  Nursing Assistant: Mela Kelly    Estimated Blood Loss: 20  Urine Voided:  Specimens:                  ID Type Source Tests Collected by Time Destination   A :  Tissue Hernia, Sac TISSUE EXAM Riaz Grey MD 6/15/2017 1200           Drains:           Findings: very small femoral hernia, no inguinal hernia. Completely intact prior inguinal hernia repair with mesh    Complications: none      Riaz Grey MD     Date: 6/15/2017  Time: 1:23 PM         Electronically signed by Riaz Grey MD at 6/15/2017  1:25 PM           Physician Progress Notes (last 72 hours) (Notes from 6/17/2017 11:17 AM through 6/20/2017 11:17 AM)      Henrry Lucas MD at 6/17/2017  3:42 PM  Version 1 of 1         Hospitalist Daily Progress Note    Date of Admission: 6/13/2017   LOS: 4 days   PCP: Blake Gomez MD    Chief Complaint: Abdominal pain    Subjective    Patient  very confused again this morning and  he was a bit aggressive and was putting the lines out. He was more calm when I saw him but still was very confused and disoriented.  No chest pain or palpitation.  No cough or shortness of breath.  No nausea, vomiting, diarrhea, abdominal pain.    History of Present Illness  Unable to obtain secondary to patient's mental status.    Objective   Physical Exam:  I have reviewed the vital signs.  Temp:  [97.8 °F (36.6 °C)-98.7 °F (37.1 °C)] 97.8 °F (36.6 °C)  Heart Rate:  [55-60] 60  Resp:  [16] 16  BP: (123-140)/(55-57) 140/57    Objective  General Appearance:    Alert, cooperative, no distress  Head:    Normocephalic, atraumatic  Eyes:    Sclerae anicteric  Neck:   Supple, no mass  Lungs: Clear to auscultation bilaterally, respirations unlabored  Heart: Regular rate and rhythm, S1 and S2 normal, no murmur, rub or gallop  Abdomen:  Soft, non-tender, bowel sounds active, nondistended  Extremities: Cachectic No clubbing, cyanosis, or edema to lower extremities  Skin: No rashes   Neurologic: Awake, alert, very confused but follows simple commands.    Results Review:    I have reviewed the labs, radiology results and diagnostic studies.      Results from last 7 days  Lab Units 06/16/17  0535   WBC 10*3/mm3 6.37   HEMOGLOBIN g/dL 12.6*   PLATELETS  10*3/mm3 230       Results from last 7 days  Lab Units 06/17/17  0516 06/16/17  0535   SODIUM mmol/L  --  137   POTASSIUM mmol/L 4.3 3.5   TOTAL CO2 mmol/L  --  27.0   CREATININE mg/dL  --  0.80   GLUCOSE mg/dL  --  84       I have reviewed the medications.    ---------------------------------------------------------------------------------------------  Assessment/Plan   Assessment & Plan  Assessment/Problem List    Principal Problem:    Incarcerated left inguinal hernia, reduced, s/p surgery, no complication.      Chronic atrial fibrillation was on Eliquis at home.      Confusion probably delirium.       Essential hypertension      Seizure disorder      Late onset Alzheimer's disease without behavioral disturbance      Cachexia.       Plan  - Continue current care.        DVT prophylaxis:  Discharge Planning: I expect patient to be discharged to be determined.    Henrry Lucas MD 06/17/17 3:42 PM             Electronically signed by Henrry Lucas MD at 6/17/2017  3:44 PM      ISRAEL Kim at 6/18/2017 11:52 AM  Version 1 of 1         Hospitalist Daily Progress Note    Date of Admission: 6/13/2017   LOS: 5 days   PCP: Blake Gomez MD    Chief Complaint: Abdominal pain    Subjective    Patient very confused still, no agitiation. Wife at bedside concerned about patient's ability to swallow. Underwent eval per SLP and now NPO. Wife feels he breathing sounds wet today.    History of Present Illness  Unable to obtain secondary to patient's mental status.    Objective   Physical Exam:  I have reviewed the vital signs.  Temp:  [97.5 °F (36.4 °C)-98.5 °F (36.9 °C)] 98.5 °F (36.9 °C)  Heart Rate:  [77-83] 83  Resp:  [16-18] 16  BP: (128-155)/(76-81) 128/81    Objective  General Appearance:    Alert, cooperative, no distress  Head:    Normocephalic, atraumatic  Eyes:    Sclerae anicteric  Neck:   Supple, no mass  Lungs: Clear to auscultation bilaterally, respirations unlabored- upper airway  rhonchi  Heart: Regular rate and rhythm, S1 and S2 normal, no murmur, rub or gallop  Abdomen:  Soft, non-tender, bowel sounds active, nondistended  Extremities: Cachectic No clubbing, cyanosis, or edema to lower extremities  Skin: No rashes   Neurologic: Awake, alert, very confused but follows simple commands.    Results Review:    I have reviewed the labs, radiology results and diagnostic studies.      Results from last 7 days  Lab Units 06/16/17  0535   WBC 10*3/mm3 6.37   HEMOGLOBIN g/dL 12.6*   PLATELETS 10*3/mm3 230       Results from last 7 days  Lab Units 06/17/17  0516 06/16/17  0535   SODIUM mmol/L  --  137   POTASSIUM mmol/L 4.3 3.5   TOTAL CO2 mmol/L  --  27.0   CREATININE mg/dL  --  0.80   GLUCOSE mg/dL  --  84       I have reviewed the medications.    ---------------------------------------------------------------------------------------------  Assessment/Plan   Assessment & Plan  Assessment/Problem List    Principal Problem:    Incarcerated left inguinal hernia, reduced, s/p surgery, no complication.      Chronic atrial fibrillation was on Eliquis at home.      Confusion probably delirium.       Essential hypertension      Seizure disorder      Late onset Alzheimer's disease without behavioral disturbance      Cachexia.       Plan  - restart Eliquis today  -- follow up with Dr. Mendez in 2 weeks, healing well  -- MBS tomorrow for safe discharge diet. Discussed with wife who agrees that patient would not do well with PEG/ NPO diet given dementia. Plan to discharge on safest diet possible with SLP therapy outpatient/ home tx  -- CXR today  -- cbc in am  -- if continues to progress and safe discharge plan, possibly home tomorrow      DVT prophylaxis:eliquis  Discharge Planning: I expect patient to be discharged 1-2 days    ISRAEL Rodas 06/18/17 11:52 AM             Electronically signed by ISRAEL Kim at 6/18/2017 12:04 PM      Le Mann MD at 6/19/2017  1:05 PM  Version 1 of 1          Hospitalist Daily Progress Note    Date of Admission: 6/13/2017   LOS: 6 days   PCP: Blake Gomez MD    Chief Complaint: Abdominal pain    Subjective    Patient calm, no complaints  Wife says he hasnt walked since surgery  [place] needs [place] be able to walk to [place].    No other complaitns.      History of Present Illness  Unable to obtain secondary to patient's mental status.    Objective   Physical Exam:  I have reviewed the vital signs.  Temp:  [95.6 °F (35.3 °C)] 95.6 °F (35.3 °C)  Heart Rate:  [79] 79  Resp:  [16] 16  BP: (120)/(66) 120/66    Objective  General Appearance:    Alert, cooperative, no distress.  Awaiting transport for [facility].  Calm  Head:    Normocephalic, atraumatic  Eyes:    Sclerae anicteric  Neck:   Supple, no mass  Lungs: Clear to auscultation bilaterally, respirations unlabored- upper airway rhonchi  Heart: Regular rate and rhythm, S1 and S2 normal, no murmur, rub or gallop  Abdomen:  Soft, non-tender, bowel sounds active, nondistended  Extremities: Cachectic No clubbing, cyanosis, or edema to lower extremities  Skin: No rashes   Neurologic: Awake, alert, very confused but follows simple commands.    Results Review:    I have reviewed the labs, radiology results and diagnostic studies.      Results from last 7 days  Lab Units 06/19/17  0605   WBC 10*3/mm3 10.58   HEMOGLOBIN g/dL 14.6   PLATELETS 10*3/mm3 303       Results from last 7 days  Lab Units 06/17/17  0516 06/16/17  0535   SODIUM mmol/L  --  137   POTASSIUM mmol/L 4.3 3.5   TOTAL CO2 mmol/L  --  27.0   CREATININE mg/dL  --  0.80   GLUCOSE mg/dL  --  84       I have reviewed the medications.    ---------------------------------------------------------------------------------------------  Assessment/Plan   Assessment & Plan  Assessment/Problem List    Principal Problem:    Incarcerated left inguinal hernia, reduced, s/p surgery, no complication.      Chronic atrial fibrillation was on Eliquis at home.       Confusion probably delirium.       Essential hypertension      Seizure disorder      Late onset Alzheimer's disease without behavioral disturbance      Cachexia.       Plan  - restarted Eliquis    -- follow up with Dr. Mendez in 2 weeks, healing well  -dysphagia:  -- MBS to establish safe discharge diet.   -- Discussed with wife who agrees that patient would not do well with PEG/ NPO diet given dementia.   -- Plan to discharge on safest diet possible with SLP therapy outpatient/ home tx    Home today if he can walk.   Needs PT          DVT prophylaxis:eliquis  Discharge Planning: I expect patient to be discharged 1-2 days    Le Mann MD 06/19/17 1:05 PM             Electronically signed by Le Mann MD at 6/19/2017  1:15 PM        Consult Notes (last 24 hours) (Notes from 6/19/2017 11:17 AM through 6/20/2017 11:17 AM)     No notes of this type exist for this encounter.           Speech Language Pathology Notes (last 72 hours) (Notes from 6/17/2017 11:17 AM through 6/20/2017 11:17 AM)      Mihaela Melendez MS CCC-SLP at 6/17/2017  1:58 PM  Version 1 of 1         Problem: Patient Care Overview (Adult)  Goal: Plan of Care Review  Outcome: Ongoing (interventions implemented as appropriate)    06/17/17 1356   Coping/Psychosocial Response Interventions   Plan Of Care Reviewed With patient;spouse   Patient Care Overview   Progress progress towards functional goals is fair   Outcome Evaluation   Outcome Summary/Follow up Plan FEES performed d/t h/o silent aspiration. PO trials of thin, NTL and pudding via tsp only. Visualization difficult due to hypopharynx contraction and posterior resting epiglottis. Delayed pharyngeal initiation of swallow at least to pyriforms was visualized, unable to r/o aspiration d/t delay, across consistencies. Decreased base of tongue retraction, posterior pharyngeal wall squeeze, and decreased hyolaryngeal elevation and excursion resulting in diffuse moderate residue. Decreased  vestibular closure during the swallow resulting in penetration at least to the true vocal folds, unable to r/o aspiration during the swallow d/t limited view. Residue was visualized post-swallow in vestibule and on arytenoids, spilling into airway ultimately resulting in aspiration across consistencies. Recommend NPO with oral care BID and PRN, alternate means of nutrtion at MD discretion, meds alternate route, re-eval Monday.              Electronically signed by Mihaela Melendez, MS CCC-SLP at 2017  1:58 PM      Mihaela Melendez, MS CCC-SLP at 2017  1:59 PM  Version 1 of 1         Acute Care - Speech Language Pathology   Swallow Initial Evaluation  [place]                 Fiberoptic Endoscopic Evaluation of Swallowing (FEES)    Patient Name: Paul Lyons  : 1927  MRN: [id number]  Today's Date: 2017               Admit Date: 2017    Visit Dx:     ICD-10-CM ICD-9-CM   1. Inguinal hernia K40.90 550.90     Patient Active Problem List   Diagnosis   • Lung nodule   • CAD (coronary artery disease)   • Chronic atrial fibrillation   • Essential hypertension   • Hyperlipemia   • Shoulder bursitis   • Seizure disorder   • Late onset Alzheimer's disease without behavioral disturbance   • History of skin cancer   • BPH (benign prostatic hyperplasia)   • Glaucoma   • Hiatal hernia with GERD   • LEE on CPAP   • Bradycardia   • Ataxia   • Weakness   • GERD (gastroesophageal reflux disease)   • Incarcerated left inguinal hernia   • Chronic kidney disease   • Severe malnutrition     Past Medical History:   Diagnosis Date   • Abnormal CT scan, lung     PULMONARY NODULE (<6CM)   • Atrial fibrillation    • BPH (benign prostatic hyperplasia) 2017   • BPH (benign prostatic hypertrophy)    • Bradycardia 2017   • Cardiac disorder    • Cataract     STATUS POST REMOVAL WITH LENS IMPLANT   • Coronary artery disease    • Esophageal stricture     WTIH NOTED DILATION   • GERD  (gastroesophageal reflux disease)    • Glaucoma    • Glaucoma    • H/O inguinal hernia repair    • Hiatal hernia    • Hiatal hernia with GERD 4/26/2017   • Hypercholesteremia    • Hypertension    • Malignant neoplasm    • Obstructive sleep apnea on CPAP    • LEE on CPAP 4/26/2017   • Paroxysmal atrial fibrillation    • Peripheral vascular disease    • Right upper lobe pneumonia    • Seizure disorder    • Skin cancer     WITH REMOVAL   • TIA (transient ischemic attack) 02/2013    Carotid duplex 50% right ICA.     Past Surgical History:   Procedure Laterality Date   • BACK SURGERY     • CATARACT EXTRACTION WITH INTRAOCULAR LENS IMPLANT     • CERVICAL LAMINECTOMY     • CORONARY ARTERY BYPASS GRAFT  04/15/2002    x4   • ESOPHAGEAL DILATATION     • HERNIA REPAIR     • INGUINAL HERNIA REPAIR Left 6/15/2017    Procedure: LEFT INGUINAL HERNIA REPAIR;  Surgeon: Riaz Grey MD;  Location: [facility];  Service:    • LUMBAR LAMINECTOMY  02/2012   • SKIN CANCER EXCISION            SWALLOW EVALUATION (last 72 hours)      Swallow Evaluation       06/17/17 1130                Rehab Evaluation    Document Type evaluation  -JW        Subjective Information --   confused  -JW        Patient Effort, Rehab Treatment good  -JW        Symptoms Noted During/After Treatment none  -JW        General Information    Patient Profile Review yes  -JW        Subjective Patient Observations alert but confused.   -JW        Pertinent History Of Current Problem dementia, h/o silent aspiration, hernia  -JW        Current Diet Limitations regular solid  -JW        Prior Level of Function- Communication --   impaired d/t cognition  -JW        Prior Level of Function- Swallowing no diet consistency restrictions  -        Plans/Goals Discussed With patient and family  -JW        Barriers to Rehab cognitive status;previous functional deficit  -JW        Fiberoptic Endoscopic Swallowing Exam    Risks/Benefits Reviewed risks/benefits explained;agreed  to eval;patient;family  -        Nasal Entry, Topical Anesthetic left:;nostril entered using no topical anesthetic  -        Anatomy and Physiology    Velopharyngeal WFL  -        Base of Tongue symmetrical  -        Epiglottis rests posteriorly  -        Laryngeal Function Breathing CNA  -        Laryngeal Function Phonation CNA  -        Laryngeal Function Breath Hold CNA  -        Secretions 0- Normal, no visible secretions  -        Secretion Description thin;clear  -        Ice Chips DNA  -        Spontaneous Swallow frequency reduced  -        Sensory reduced sensation  -        Oral-Phary Transit WFL   for limited trials presented  -        Anatomy Hypopharynx    Observation Anatomic Considerations no anatomic structural deviation via FEES  -        FEES    Mode of Presentation thin:;nectar:;pudding:;fed by clinician;spoon  -        Pharyngeal Phase Impairment all consistencies:;bolus to pyriforms before initiation of response;reduced pharyngeal wall contraction;tongue base retraction reduced;reduced laryngeal elevation;reduced anterior hyolaryngeal excursion;reduced closure vestibule;aspiration after from residue  -        Post Swallow Residue all consistencies:;residue present pyriform sinuses;residue present on pharyngeal walls;unable to clear residue in pyriform sinuses;post swallow residue present;residue present in valleculae  -        Rosenbek's Scale 8-->Level 8  -        Response to Aspiration absent response, silent aspiration  -        Pharyngeal Phase Results impaired pharyngeal phase of swallowing  -        FEES Summary FEES performed d/t h/o silent aspiration. PO trials of thin, NTL and pudding via tsp only. Visualization difficult due to hypopharynx contraction and  posterior resting epiglottis. Delayed pharyngeal initiation of swallow at least to pyriforms was visualized, unable to r/o aspiration d/t delay, across consistencies. Decreased base of  tongue retraction, posterior pharyngeal wall squeeze, and decreased hyolaryngeal elevation and excursion resulting in diffuse moderate residue. Decreased vestibular closure during the swallow resulting in penetration at least to the true vocal folds, unable to r/o aspiration during the swallow d/t limited view. Residue was visualized post-swallow in vestibule and on arytenoids, spilling into airway ultimately resulting in aspiration across consistencies. Recommend NPO with oral care BID and PRN, alternate means of nutrtion at MD discretion, meds alternate route, re-eval Monday.  -        FEES Swallow Recommendations    Oral Care oral care with toothbrush and dentifrice BID and PRN  -        Recommended Diet NPO: unsafe for food/liquid intake;temporary alternative means of nutrition/hydration  -        Ther Interventions Swallowing Comment re-assess monday via FEES or MBS d/t silent aspiration  -          User Key  (r) = Recorded By, (t) = Taken By, (c) = Cosigned By    Initials Name Effective Dates     Mihaela Melendez MS CCC-SLP 08/11/15 -         EDUCATION  The patient has been educated in the following areas:   Dysphagia (Swallowing Impairment) NPO rationale.    SLP Recommendation and Plan           Plan of Care Review  Plan Of Care Reviewed With: patient, spouse  Progress: progress towards functional goals is fair  Outcome Summary/Follow up Plan: FEES performed d/t h/o silent aspiration. PO trials of thin, NTL and pudding via tsp only. Visualization difficult due to hypopharynx contraction and  posterior resting epiglottis. Delayed pharyngeal initiation of swallow at least to pyriforms was visualized, unable to r/o aspiration d/t delay, across consistencies. Decreased base of tongue retraction, posterior pharyngeal wall squeeze, and decreased hyolaryngeal elevation and excursion resulting in diffuse moderate residue. Decreased vestibular closure during the swallow resulting in penetration at  least to the true vocal folds, unable to r/o aspiration during the swallow d/t limited view. Residue was visualized post-swallow in vestibule and on arytenoids, spilling into airway ultimately resulting in aspiration across consistencies. Recommend NPO with oral care BID and PRN, alternate means of nutrtion at MD discretion, meds alternate route, re-eval Monday.             Time Calculation:         Time Calculation- SLP       17 1359          Time Calculation- SLP    SLP Start Time 1130  -      SLP Received On 17  -        User Key  (r) = Recorded By, (t) = Taken By, (c) = Cosigned By    Initials Name Provider Type     Mihaela Melendez MS CCC-SLP Speech and Language Pathologist          Therapy Charges for Today     Code Description Service Date Service Provider Modifiers Qty    99368038345 HC ST FIBEROPTIC ENDO EVAL SWALL 6 2017 Mihaela Melendez MS CCC-SLP GN 1               MS JAYATN Young  2017     Electronically signed by Mihaela Melendez MS CCC-SLP at 2017  2:00 PM      Tejal Harden MS CCC-SLP at 2017  4:34 PM  Version 1 of 1         Acute Care - Speech Language Pathology   Swallow Initial Evaluation  [place]     Patient Name: Paul Lyons  : 1927  MRN: [id number]  Today's Date: 2017  Onset of Illness/Injury or Date of Surgery Date: 17            Admit Date: 2017    Visit Dx:     ICD-10-CM ICD-9-CM   1. Impaired functional mobility, balance, gait, and endurance Z74.09 V49.89   2. Inguinal hernia K40.90 550.90   3. Oropharyngeal dysphagia R13.12 787.22     Patient Active Problem List   Diagnosis   • Lung nodule   • CAD (coronary artery disease)   • Chronic atrial fibrillation   • Essential hypertension   • Hyperlipemia   • Shoulder bursitis   • Seizure disorder   • Late onset Alzheimer's disease without behavioral disturbance   • History of skin cancer   • BPH (benign prostatic  hyperplasia)   • Glaucoma   • Hiatal hernia with GERD   • LEE on CPAP   • Bradycardia   • Ataxia   • Weakness   • GERD (gastroesophageal reflux disease)   • Incarcerated left inguinal hernia   • Chronic kidney disease   • Severe malnutrition     Past Medical History:   Diagnosis Date   • Abnormal CT scan, lung     PULMONARY NODULE (<6CM)   • Atrial fibrillation    • BPH (benign prostatic hyperplasia) 4/26/2017   • BPH (benign prostatic hypertrophy)    • Bradycardia 4/26/2017   • Cardiac disorder    • Cataract     STATUS POST REMOVAL WITH LENS IMPLANT   • Coronary artery disease    • Esophageal stricture     WTIH NOTED DILATION   • GERD (gastroesophageal reflux disease)    • Glaucoma    • Glaucoma    • H/O inguinal hernia repair    • Hiatal hernia    • Hiatal hernia with GERD 4/26/2017   • Hypercholesteremia    • Hypertension    • Malignant neoplasm    • Obstructive sleep apnea on CPAP    • LEE on CPAP 4/26/2017   • Paroxysmal atrial fibrillation    • Peripheral vascular disease    • Right upper lobe pneumonia    • Seizure disorder    • Skin cancer     WITH REMOVAL   • TIA (transient ischemic attack) 02/2013    Carotid duplex 50% right ICA.     Past Surgical History:   Procedure Laterality Date   • BACK SURGERY     • CATARACT EXTRACTION WITH INTRAOCULAR LENS IMPLANT     • CERVICAL LAMINECTOMY     • CORONARY ARTERY BYPASS GRAFT  04/15/2002    x4   • ESOPHAGEAL DILATATION     • HERNIA REPAIR     • INGUINAL HERNIA REPAIR Left 6/15/2017    Procedure: LEFT INGUINAL HERNIA REPAIR;  Surgeon: Riaz Grey MD;  Location: [facility];  Service:    • LUMBAR LAMINECTOMY  02/2012   • SKIN CANCER EXCISION            SWALLOW EVALUATION (last 72 hours)      Swallow Evaluation       06/19/17 1400 06/17/17 1130             Rehab Evaluation    Document Type evaluation  -SG evaluation  -JW       Subjective Information no complaints  -SG --   confused  -JW       Patient Effort, Rehab Treatment good  -SG good  -JW       Symptoms Noted  During/After Treatment none  -SG none  -JW       General Information    Patient Profile Review yes  -SG yes  -JW       Subjective Patient Observations alert, confused  -SG alert but confused.   -JW       Pertinent History Of Current Problem adm w/ dementia and h/o aspiration  -SG dementia, h/o silent aspiration, hernia  -JW       Current Diet Limitations NPO  -SG regular solid  -JW       Precautions/Limitations, Vision WFL  -SG        Precautions/Limitations, Hearing WFL  -SG        Prior Level of Function- Communication functional for ADL's with assistance  -SG --   impaired d/t cognition  -JW       Prior Level of Function- Swallowing no diet consistency restrictions  -SG no diet consistency restrictions  -JW       Plans/Goals Discussed With patient and family;agreed upon  -SG patient and family  -JW       Barriers to Rehab cognitive status  -SG cognitive status;previous functional deficit  -JW       Videofluoroscopic Swallowing Exam    Risks/Benefits Reviewed risks/benefits explained;agreed to eval;patient;family   wife present for MBS  -SG        Positioning Needs for Swallow Exam upright at 90 for exam  -SG        Radiologic Views Used for Examination left lateral view  -SG        Motor Function During Phonation/Speech    Observation Anatomic Considerations C-spine fusion  -SG        VFSS    Mode of Presentation thin:;nectar:;pudding:;mixed:;cohesive solid:;cup;spoon;straw  -SG        Oral Phase Results increased prep time;incomplete bolus preparation   with solid item  -SG        Oral Transit Impairment unable to coordinate with piecemeal oral transit of large bolus  -SG        Pharyngeal Phase Physiologic Impairment impaired timing with penetration before;aspiration after from residue  -SG        Post Swallow Residue other (see comments)   vestibular residue w/ penetration  -SG        Rosenbek's PenAsp Scale 8-->Level 8  -SG        Response to Aspiration absent response, silent aspiration  -SG        Att  Compensatory Maneuvers bolus size;bolus presentation style;chin down position;effortful (hard) swallow  -SG        Pharyngeal Phase Results impaired pharyngeal phase of swallowing  -SG        Summary of VFSS MBS compelted. Pt presented w/ moderate oropharyngeal dysphagia. Inc'd prep time w/ solid 2' ltd dentition. No aspiration with pureed or NT trials via tsp and cup. Deep penetration during the swallow with thins via tsp, cup and straw and NT via straw. Residue coated into vestibule as study pregressed and resulted in aspiration after the swallow. All aspiration was silent. Moderate-severe valleculae and pys residue w/ all which majoiry cleared w/ subsequent swallows and liquid washes of NT. However, at the end of exam with solid item, deep penetration resulted 2' pys and valleculae residue accumulating then spilling into vestibule w/ subsequent swallows (non-cohesive texture and inadequate masication of solid caused this)-- trace amounts fell to TVC level. With cued cough, pt cleared. Rec: L3, NT, no straws, dys tx, con't dys tx at home w/ HH at d/c. SLP to f/u for further education w/ pt and wife.   -SG        VFSS Swallow Recommendations    Eating Assistance requires caregiver assistance for eating;needs constant supervision during self eating activity  -SG        Oral Care oral care with toothbrush and dentifrice BID and PRN  -SG        Modified Eating Strategies upright positioning 90 degrees;alternate food and liquid swallows  -SG        Other Recommendations puree with some mashed;nectar thick   no straws  -SG        Fiberoptic Endoscopic Swallowing Exam    Risks/Benefits Reviewed  risks/benefits explained;agreed to eval;patient;family  -JW       Nasal Entry, Topical Anesthetic  left:;nostril entered using no topical anesthetic  -JW       Anatomy and Physiology    Velopharyngeal  WFL  -JW       Base of Tongue  symmetrical  -JW       Epiglottis  rests posteriorly  -JW       Laryngeal Function Breathing  CNA   -JW       Laryngeal Function Phonation  CNA  -JW       Laryngeal Function Breath Hold  CNA  -JW       Secretions  0- Normal, no visible secretions  -       Secretion Description  thin;clear  -JW       Ice Chips  DNA  -       Spontaneous Swallow  frequency reduced  -       Sensory  reduced sensation  -       Oral-Phary Transit  WFL   for limited trials presented  -       Anatomy Hypopharynx    Observation Anatomic Considerations  no anatomic structural deviation via FEES  -       FEES    Mode of Presentation  thin:;nectar:;pudding:;fed by clinician;spoon  -       Pharyngeal Phase Impairment  all consistencies:;bolus to pyriforms before initiation of response;reduced pharyngeal wall contraction;tongue base retraction reduced;reduced laryngeal elevation;reduced anterior hyolaryngeal excursion;reduced closure vestibule;aspiration after from residue  -       Post Swallow Residue  all consistencies:;residue present pyriform sinuses;residue present on pharyngeal walls;unable to clear residue in pyriform sinuses;post swallow residue present;residue present in valleculae  -       Rosenbek's Scale  8-->Level 8  -       Response to Aspiration  absent response, silent aspiration  -       Pharyngeal Phase Results  impaired pharyngeal phase of swallowing  -       FEES Summary  FEES performed d/t h/o silent aspiration. PO trials of thin, NTL and pudding via tsp only. Visualization difficult due to hypopharynx contraction and  posterior resting epiglottis. Delayed pharyngeal initiation of swallow at least to pyriforms was visualized, unable to r/o aspiration d/t delay, across consistencies. Decreased base of tongue retraction, posterior pharyngeal wall squeeze, and decreased hyolaryngeal elevation and excursion resulting in diffuse moderate residue. Decreased vestibular closure during the swallow resulting in penetration at least to the true vocal folds, unable to r/o aspiration during the swallow d/t  limited view. Residue was visualized post-swallow in vestibule and on arytenoids, spilling into airway ultimately resulting in aspiration across consistencies. Recommend NPO with oral care BID and PRN, alternate means of nutrtion at MD discretion, meds alternate route, re-eval Monday.  -       FEES Swallow Recommendations    Oral Care  oral care with toothbrush and dentifrice BID and PRN  -       Recommended Diet  NPO: unsafe for food/liquid intake;temporary alternative means of nutrition/hydration  -       Ther Interventions Swallowing Comment  re-assess monday via FEES or MBS d/t silent aspiration  -       Improve timing of pharyngeal response    To improve timing of pharyngeal response, patient will: Swallow in timely way using super-supraglottic swallow;Swallow in timely way using Mendelsohn maneuver;80%;with consistent cues  -SG        Status: Improve timing of pharyngeal response New  -SG        Timing of Pharyngeal Response Progress continue to adress  -SG        Improve closure at entrance to airway    To improve closure at entrance to airway, patient will: Complete super-supraglottic swallow;80%;with consistent cues  -SG        Status: Improve closure at entrance to airway New  -SG        Closure at Entrance to Airway Progress continue to adress  -SG        Improve laryngeal elevation    To improve laryngeal elevation, patient will: Complete super-supraglottic swallow;80%;with consistent cues  -SG        Status: Improve laryngeal elevation New  -SG        Laryngeal Elevation Progress continue to adress  -SG        Improve tongue base & pharyngeal wall squeeze    To improve tongue base & pharyngeal wall squeeze, patient will: Complete effortful swallow;Complete tongue hold swallow;80%;with consistent cues  -SG        Status: Improve tongue base & pharyngeal wall squeeze New  -SG        Tongue Base/Pharyngeal Wall Squeeze Progress continue to adress  -SG          User Key  (r) = Recorded By, (t) =  Taken By, (c) = Cosigned By    Initials Name Effective Dates    EMIR Tolentino Fina, MS CCC-SLP 06/22/15 -     JW Mihaela Melendez, MS CCC-SLP 08/11/15 -         EDUCATION  The patient has been educated in the following areas:   Dysphagia (Swallowing Impairment) Oral Care/Hydration Modified Diet Instruction.    SLP Recommendation and Plan                                           Plan of Care Review  Plan Of Care Reviewed With: patient  Progress:  ([facility] [place])  Outcome Summary/Follow up Plan: MBS compelted. Pt presented w/ moderate oropharyngeal dysphagia. Inc'd prep time w/ solid 2' ltd dentition. No aspiration with pureed or NT trials via tsp and cup. Deep penetration during the swallow with thins via tsp, cup and straw and NT via straw. Residue coated into vestibule as study pregressed and resulted in aspiration after the swallow. All aspiration was silent. Moderate-severe valleculae and pys residue w/ all which majoiry cleared w/ subsequent swallows and liquid washes of NT. However, at the end of exam with solid item, deep penetration resulted 2' pys and valleculae residue accumulating then spilling into vestibule w/ subsequent swallows (non-cohesive texture and inadequate masication of solid caused this)-- trace amounts fell to TVC level. With cued cough, pt cleared. Rec: L3, NT, no straws, dys tx, con't dys tx at home w/ HH at d/c. SLP to f/u for further education w/ pt and wife.           IP SLP Goals       06/19/17 1636          Safely Consume Diet    Safely Consume Diet- SLP, Date Established 06/19/17  -SG      Safely Consume Diet- SLP, Time to Achieve by discharge  -SG      Safely Consume Diet- SLP, Date Goal Reviewed 06/19/17  -SG      Safely Consume Diet- SLP, Outcome goal ongoing  -SG        User Key  (r) = Recorded By, (t) = Taken By, (c) = Cosigned By    Initials Name Provider Type    EMIR Tolentino Fina, MS CCC-SLP Speech and Language Pathologist               Time Calculation:          Time Calculation- SLP       06/20/17 1033          Time Calculation- SLP    SLP Start Time 1600  -SG      SLP Received On 06/20/17  -        User Key  (r) = Recorded By, (t) = Taken By, (c) = Cosigned By    Initials Name Provider Type    SG Tejal Randa Harden MS CCC-JOSE ANGEL Speech and Language Pathologist          Therapy Charges for Today     Code Description Service Date Service Provider Modifiers Qty    [id number] HC ST MOTION FLUORO EVAL SWALLOW 6 6/20/2017 MS JAYANT Casas GN 1               MS JAYANT Casas  6/20/2017     Electronically signed by MS JAYANT Casas at 6/20/2017 10:40 AM      MS JAYANT Casas at 6/19/2017  4:37 PM  Version 1 of 1         Problem: Patient Care Overview (Adult)  Goal: Plan of Care Review  Outcome: Ongoing (interventions implemented as appropriate)    06/19/17 1636   Coping/Psychosocial Response Interventions   Plan Of Care Reviewed With patient   Patient Care Overview   Progress (MBS)   Outcome Evaluation   Outcome Summary/Follow up Plan MBS compelted. Pt presented w/ moderate oropharyngeal dysphagia. Inc'd prep time w/ solid 2' ltd dentition. No aspiration with pureed or NT trials via tsp and cup. Deep penetration during the swallow with thins via tsp, cup and straw and NT via straw. Residue coated into vestibule as study pregressed and resulted in aspiration after the swallow. All aspiration was silent. Moderate-severe valleculae and pys residue w/ all which majoiry cleared w/ subsequent swallows and liquid washes of NT. However, at the end of exam with solid item, deep penetration resulted 2' pys and valleculae residue accumulating then spilling into vestibule w/ subsequent swallows (non-cohesive texture and inadequate masication of solid caused this)-- trace amounts fell to TVC level. With cued cough, pt cleared. Rec: L3, NT, no straws, dys tx, con't dys tx at home w/ HH  at d/c. SLP to f/u for further education w/ pt and wife.          Problem: Inpatient SLP  Goal: Dysphagia- Patient will safely consume diet as per recommendation with no signs/symptoms of aspiration  Outcome: Ongoing (interventions implemented as appropriate)    06/19/17 1636   Safely Consume Diet   Safely Consume Diet- SLP, Date Established 06/19/17   Safely Consume Diet- SLP, Time to Achieve by discharge   Safely Consume Diet- SLP, Date Goal Reviewed 06/19/17   Safely Consume Diet- SLP, Outcome goal ongoing              Electronically signed by Tejal Harden MS CCC-SLP at 6/19/2017  4:37 PM

## 2017-06-20 NOTE — PLAN OF CARE
Problem: Patient Care Overview (Adult)  Goal: Plan of Care Review  Outcome: Ongoing (interventions implemented as appropriate)    06/20/17 1441   Coping/Psychosocial Response Interventions   Plan Of Care Reviewed With patient   Patient Care Overview   Progress progress toward functional goals is gradual   Outcome Evaluation   Outcome Summary/Follow up Plan Attempted dys tx, but pt not alert despite max verbal and tactile cues. RN aware. Extensive edu reviewed w/ pt's wife who expressed understanding w/ all. Thickener, informational pages, and SLP contact info provided. Reviewed MBS results, aspiration precautions, and edu on risks of feeding pt unless he was fully alert. Wife expressed understanding. Pt may d/c this pm.  SLP for dysphagia has been arranged. Will follow pt until d/c. Wife knows how to contact if any questions arise.          Problem: Inpatient SLP  Goal: Dysphagia- Patient will safely consume diet as per recommendation with no signs/symptoms of aspiration  Outcome: Ongoing (interventions implemented as appropriate)    06/19/17 1636 06/20/17 1441   Safely Consume Diet   Safely Consume Diet- SLP, Date Established 06/19/17 --    Safely Consume Diet- SLP, Time to Achieve by discharge --    Safely Consume Diet- SLP, Date Goal Reviewed --  06/20/17   Safely Consume Diet- SLP, Outcome --  goal ongoing

## 2017-06-20 NOTE — PROGRESS NOTES
Hospitalist Daily Progress Note    Date of Admission: 6/13/2017   LOS: 7 days   PCP: Blake Gomez MD    Chief Complaint: Abdominal pain    Subjective    Patient sleeping, awakens to voice.  Has no complaints, is agreeable to eating his lunch that was delivered while he was asleep.      Objective   Physical Exam:  I have reviewed the vital signs.  Temp:  [97.6 °F (36.4 °C)-97.8 °F (36.6 °C)] 97.6 °F (36.4 °C)  Heart Rate:  [66-77] 66  Resp:  [16] 16  BP: (100-118)/(55-58) 118/58    Objective  Gen-no acute distress  CV-RRR, S1 S2   Resp-CTAB, no wheezes  Abd-soft, NT, ND, +BS  Ext-no edema  Neuro- slightly confused, answers questions, follows commands  Psych-appropriate mood  Skin-left groin hernia repair CAREN, steristrips c/d/i    Results Review:    I have reviewed the labs, radiology results and diagnostic studies.      Results from last 7 days  Lab Units 06/19/17  0605   WBC 10*3/mm3 10.58   HEMOGLOBIN g/dL 14.6   PLATELETS 10*3/mm3 303       Results from last 7 days  Lab Units 06/17/17  0516 06/16/17  0535   SODIUM mmol/L  --  137   POTASSIUM mmol/L 4.3 3.5   TOTAL CO2 mmol/L  --  27.0   CREATININE mg/dL  --  0.80   GLUCOSE mg/dL  --  84         I have reviewed the medications.    Current Facility-Administered Medications:   •  apixaban (ELIQUIS) tablet 2.5 mg, 2.5 mg, Oral, Q12H, Brittani Macias, APRN, 2.5 mg at 06/20/17 1020  •  docusate sodium (COLACE) capsule 100 mg, 100 mg, Oral, BID PRN, Riaz Grey MD  •  dorzolamide (TRUSOPT) 2 % ophthalmic solution 1 drop, 1 drop, Right Eye, BID, Lydia ARRINGTON MD, 1 drop at 06/20/17 1020  •  hydrALAZINE (APRESOLINE) injection 10 mg, 10 mg, Intravenous, Q6H PRN, Lydia ARRINGTON MD, 10 mg at 06/13/17 1646  •  latanoprost (XALATAN) 0.005 % ophthalmic solution 1 drop, 1 drop, Both Eyes, Nightly, Lydia ARRINGTON MD, 1 drop at 06/19/17 4479  •  levETIRAcetam (KEPPRA) 100 MG/ML solution 500 mg, 500 mg, Oral, Q12H, Italia Chand [facility]  •  LORazepam (ATIVAN)  injection 0.25 mg, 0.25 mg, Intravenous, Once, Brittanidonna Valentine DO  •  LORazepam (ATIVAN) injection 0.5 mg, 0.5 mg, Intravenous, Q6H PRN, Brittanidonna Valentine DO, 0.5 mg at 06/17/17 1937  •  Magnesium Sulfate 2 gram Bolus, followed by 8 gram infusion (total Mg dose 10 grams)- Mg less than or equal to 1mg/dL, 2 g, Intravenous, PRN **OR** Magnesium Sulfate 6 gram Infusion (2 gm x 3) -Mg 1.1 -1.5 mg/dL, 2 g, Intravenous, PRN **OR** magnesium sulfate 4 gram infusion- Mg 1.6-1.9 mg/dL, 4 g, Intravenous, PRN, Henrry Lucas MD, Last Rate: 25 mL/hr at 06/17/17 0912, 4 g at 06/17/17 0912  •  memantine (NAMENDA) tablet 5 mg, 5 mg, Oral, Q12H, Henrry Lucas MD, 5 mg at 06/20/17 1021  •  Morphine sulfate (PF) injection 1 mg, 1 mg, Intravenous, Q4H PRN **AND** naloxone (NARCAN) injection 0.4 mg, 0.4 mg, Intravenous, Q5 Min PRN, Lydia ARRINGTON MD  •  Morphine sulfate (PF) injection 4 mg, 4 mg, Intravenous, Q2H PRN, 4 mg at 06/16/17 2357 **AND** naloxone (NARCAN) injection 0.4 mg, 0.4 mg, Intravenous, Q5 Min PRN, Riaz Grey MD  •  ondansetron (ZOFRAN) injection 4 mg, 4 mg, Intravenous, Q6H PRN, Lydia ARRINGTON MD, 4 mg at 06/15/17 1320  •  oxyCODONE-acetaminophen (PERCOCET) 5-325 MG per tablet 1 tablet, 1 tablet, Oral, Q4H PRN, Riaz Grey MD, 1 tablet at 06/16/17 2037  •  potassium chloride (MICRO-K) CR capsule 40 mEq, 40 mEq, Oral, PRN, 40 mEq at 06/16/17 2302 **OR** [DISCONTINUED] potassium chloride (KLOR-CON) packet 40 mEq, 40 mEq, Oral, PRN **OR** potassium chloride 10 mEq in 100 mL IVPB, 10 mEq, Intravenous, Q1H PRN, Henrry Lucas MD  •  QUEtiapine (SEROquel) tablet 25 mg, 25 mg, Oral, Q12H, Henrry Lucas MD, 25 mg at 06/20/17 1021  •  sodium chloride 0.9 % flush 1-10 mL, 1-10 mL, Intravenous, PRN, Lydia ARRINGTON MD  •  timolol (TIMOPTIC) 0.25 % ophthalmic solution 1 drop, 1 drop, Right Eye, Daily, Lydia ARRINGTON MD, 1 drop at 06/20/17  1020    ---------------------------------------------------------------------------------------------  Assessment/Plan   Assessment & Plan  Assessment/Problem List    Principal Problem:    Incarcerated left inguinal hernia, reduced, s/p surgery, no complication.    - f/u Dr. Grey in 2 weeks       Chronic atrial fibrillation     - home eliquis restarted      Confusion probably delirium.       Essential hypertension      Seizure disorder      Late onset Alzheimer's disease without behavioral disturbance      Cachexia.      Dysphagia    - SLP following, s/p MBS     - recs L3 dysphagia diet, NT, no straws    Dispo: Home with HH vs SNF --> PT is recommending SNF, latest note from CM indicates wife's preference for HH agency - unclear plan, wife unavailable to discuss today.    DVT prophylaxis:eliquis  Discharge Planning: I expect patient to be discharged 1-2 days    Julieta Diaz, APRN 06/20/17 3:47 PM

## 2017-06-21 VITALS
RESPIRATION RATE: 18 BRPM | SYSTOLIC BLOOD PRESSURE: 118 MMHG | TEMPERATURE: 97.8 F | OXYGEN SATURATION: 97 % | HEIGHT: 67 IN | HEART RATE: 72 BPM | DIASTOLIC BLOOD PRESSURE: 68 MMHG | WEIGHT: 102 LBS | BODY MASS INDEX: 16.01 KG/M2

## 2017-06-21 PROCEDURE — 99239 HOSP IP/OBS DSCHRG MGMT >30: CPT | Performed by: INTERNAL MEDICINE

## 2017-06-21 RX ADMIN — TIMOLOL MALEATE 1 DROP: 2.5 SOLUTION/ DROPS OPHTHALMIC at 09:13

## 2017-06-21 RX ADMIN — APIXABAN 2.5 MG: 2.5 TABLET, FILM COATED ORAL at 09:12

## 2017-06-21 RX ADMIN — LEVETIRACETAM 500 MG: 100 SOLUTION ORAL at 09:12

## 2017-06-21 RX ADMIN — DORZOLAMIDE HYDROCHLORIDE 1 DROP: 20 SOLUTION/ DROPS OPHTHALMIC at 09:12

## 2017-06-21 RX ADMIN — MEMANTINE 5 MG: 10 TABLET ORAL at 09:12

## 2017-06-21 NOTE — PROGRESS NOTES
Continued Stay Note   [place]     Patient Name: Paul Lyons  MRN: [id number]  Today's Date: 6/21/2017    Admit Date: 6/13/2017          Discharge Plan       06/21/17 1032    Case Management/Social Work Plan    Plan Home with Home Health     Patient/Family In Agreement With Plan yes    Additional Comments Chart Reviewed, spoke again with patient and patient's wife, they are not interested in rehab. There plan is to go home with [facility] health which was arranged yesterday. Please advice when patient will be medically ready for discharge               Discharge Codes     None        Expected Discharge Date and Time     Expected Discharge Date Expected Discharge Time    Jun 21, 2017             Carrie Zaragoza RN

## 2017-06-21 NOTE — PROGRESS NOTES
Continued Stay Note  [facility]     Patient Name: Paul Lyons  MRN: [id number]  Today's Date: 6/21/2017    Admit Date: 6/13/2017          Discharge Plan       06/21/17 1512    Case Management/Social Work Plan    Plan Oupatient PT and Speech    Additional Comments [facility] will not be able to provide HH services, as they were not able to obtain needed paperwork from patient's PCP, who is on vacation. Spoke with Dr. Blake Gomez's office. They said they could not find a physician to sign the paperwork, as Dr. Gomez is on vacation. Wife and patient agreeable to outpatient services. Provided prescriptions for OP PT/Speech and they plan to see those services as the [facility]. No other needs at this time.               Discharge Codes     None        Expected Discharge Date and Time     Expected Discharge Date Expected Discharge Time    Jun 21, 2017             Carrie Zaragoza RN

## 2017-06-21 NOTE — THERAPY EVALUATION
Acute Care - Occupational Therapy Initial Evaluation  [facility]     Patient Name: Paul Lyons  : 1927  MRN: [id number]  Today's Date: 2017  Onset of Illness/Injury or Date of Surgery Date: 17  Date of Referral to OT: 17  Referring Physician: MD Mackenzie     Admit Date: 2017       ICD-10-CM ICD-9-CM   1. Oropharyngeal dysphagia R13.12 787.22   2. Inguinal hernia K40.90 550.90   3. Impaired functional mobility, balance, gait, and endurance Z74.09 V49.89   4. Hearing impaired, unspecified laterality H91.90 389.9   5. Incarcerated left inguinal hernia K40.30 550.10   6. Ataxia R27.0 781.3   7. Weakness R53.1 780.79   8. Impaired mobility and ADLs Z74.09 799.89     Patient Active Problem List   Diagnosis   • Lung nodule   • CAD (coronary artery disease)   • Chronic atrial fibrillation   • Essential hypertension   • Hyperlipemia   • Shoulder bursitis   • Seizure disorder   • Late onset Alzheimer's disease without behavioral disturbance   • History of skin cancer   • BPH (benign prostatic hyperplasia)   • Glaucoma   • Hiatal hernia with GERD   • LEE on CPAP   • Bradycardia   • Ataxia   • Weakness   • GERD (gastroesophageal reflux disease)   • Incarcerated left inguinal hernia   • Chronic kidney disease   • Severe malnutrition     Past Medical History:   Diagnosis Date   • Abnormal CT scan, lung     PULMONARY NODULE (<6CM)   • Atrial fibrillation    • BPH (benign prostatic hyperplasia) 2017   • BPH (benign prostatic hypertrophy)    • Bradycardia 2017   • Cardiac disorder    • Cataract     STATUS POST REMOVAL WITH LENS IMPLANT   • Coronary artery disease    • Esophageal stricture     WTIH NOTED DILATION   • GERD (gastroesophageal reflux disease)    • Glaucoma    • Glaucoma    • H/O inguinal hernia repair    • Hiatal hernia    • Hiatal hernia with GERD 2017   • Hypercholesteremia    • Hypertension    • Malignant neoplasm    • Obstructive sleep apnea on CPAP    • LEE on CPAP  4/26/2017   • Paroxysmal atrial fibrillation    • Peripheral vascular disease    • Right upper lobe pneumonia    • Seizure disorder    • Skin cancer     WITH REMOVAL   • TIA (transient ischemic attack) 02/2013    Carotid duplex 50% right ICA.     Past Surgical History:   Procedure Laterality Date   • BACK SURGERY     • CATARACT EXTRACTION WITH INTRAOCULAR LENS IMPLANT     • CERVICAL LAMINECTOMY     • CORONARY ARTERY BYPASS GRAFT  04/15/2002    x4   • ESOPHAGEAL DILATATION     • HERNIA REPAIR     • INGUINAL HERNIA REPAIR Left 6/15/2017    Procedure: LEFT INGUINAL HERNIA REPAIR;  Surgeon: Riaz Grey MD;  Location: [facility] OR;  Service:    • LUMBAR LAMINECTOMY  02/2012   • SKIN CANCER EXCISION            OT ASSESSMENT FLOWSHEET (last 72 hours)      OT Evaluation       06/21/17 1129 06/20/17 2000 06/20/17 1400 06/20/17 1054 06/19/17 1520    Rehab Evaluation    Document Type evaluation  -EL  other (see comments)   Caregiver Instruction  -SG therapy note (daily note)  -LM evaluation  -MJ    Subjective Information agree to therapy;no complaints  -EL  no complaints;agree to therapy  -SG agree to therapy;no complaints  -LM agree to therapy;complains of;fatigue  -MJ    Patient Effort, Rehab Treatment excellent  -EL  other (see comments)   pt sleeping, too fatigued for tx  -SG excellent  -LM good  -MJ    Symptoms Noted During/After Treatment fatigue  -EL  fatigue;none  -SG none  -LM none  -MJ    General Information    Patient Profile Review yes  -EL    yes  -MJ    Onset of Illness/Injury or Date of Surgery Date 06/19/17  -EL    06/13/17  -MJ    Referring Physician MD Mackenzie   -KARINE Mann MD  -EDE    General Observations Pt supine in bed on arrival, family present in room.   -EL    Pt supine in bed, IV heplocked, tele, exit alarm. Wife present  -MJ    Pertinent History Of Current Problem Pt with L inguinal hernia. Reduced non-surgically at OSH. Presented to [facility] with nausea/ vomitting.   -EL    Pt presents with L inguinal  hernia. Was seen at OSH and hernia reduced non-surgically, but presents to [facility] with c/o nausea vomitting.   -MJ    Precautions/Limitations fall precautions  -EL  fall precautions  -SG fall precautions;other (see comments)   exit alarm  -LM fall precautions;other (see comments)   confusion; exit alarm  -MJ    Prior Level of Function independent:;all household mobility;community mobility;transfer;ADL's;shopping  -EL    independent:;all household mobility;community mobility;gait;transfer;bed mobility  -MJ    Equipment Currently Used at Home cane, straight;shower chair;walker, rolling;grab bar   was not using PTA   -EL    walker, rolling;cane, straight;shower chair;grab bar   owns, was not using  -MJ    Plans/Goals Discussed With patient;agreed upon  -EL    patient;agreed upon  -MJ    Risks Reviewed patient:;increased discomfort  -EL    patient:;LOB;increased discomfort  -MJ    Benefits Reviewed patient:;improve function;increase independence  -EL    patient:;improve function;increase independence;increase strength;increase balance;decrease pain  -MJ    Barriers to Rehab none identified  -EL    medically complex;cognitive status  -MJ    Living Environment    Lives With spouse  -EL    spouse  -MJ    Living Arrangements house  -EL    house  -MJ    Home Accessibility stairs within home;stairs to enter home;tub/shower is not walk in  -EL    stairs to enter home;stairs within home;tub/shower is not walk in  -MJ    Number of Stairs to Enter Home 4  -EL    4  -MJ    Number of Stairs Within Home 3  -EL    3  -MJ    Stair Railings at Home outside, present at both sides;inside, present on left side  -EL    inside, present at both sides;outside, present at both sides  -MJ    Clinical Impression    Date of Referral to OT 06/19/17  -EL        OT Diagnosis decreased independence with ADL  -EL        Impairments Found (describe specific impairments) gait, locomotion, and balance  -EL        Patient/Family Goals Statement return to  [facility]   -EL        Criteria for Skilled Therapeutic Interventions Met yes;treatment indicated  -EL        Rehab Potential good, to achieve stated therapy goals  -EL        Therapy Frequency daily  -EL        Anticipated Discharge Disposition home with assist;home with home health  -EL        Vital Signs    Pre Systolic BP Rehab --   vitals stable throughout  -EL        Pain Assessment    Pain Assessment No/denies pain  -EL  No/denies pain  -SG No/denies pain  -LM No/denies pain  -MJ    Vision Assessment/Intervention    Visual Impairment WFL with corrective lenses  -EL        Cognitive Assessment/Intervention    Current Cognitive/Communication Assessment impaired  -EL   impaired  -LM impaired  -MJ    Orientation Status oriented to;person;situation;required verbal cueing (specifiy in comments);place;time  -EL   oriented to;person;place;situation;disoriented to;time  -LM oriented to;person;disoriented to;place;time;situation  -MJ    Follows Commands/Answers Questions able to follow single-step instructions;100% of the time  -EL   100% of the time;able to follow single-step instructions;needs cueing;needs repetition  -% of the time;able to follow single-step instructions;needs cueing;needs repetition  -MJ    Personal Safety mild impairment  -EL   mild impairment;impulsive  -LM mild impairment;impulsive  -MJ    Personal Safety Interventions fall prevention program maintained;gait belt;muscle strengthening facilitated;nonskid shoes/slippers when out of bed  -EL        ROM (Range of Motion)    General ROM upper extremity range of motion deficits identified  -EL    upper extremity range of motion deficits identified  -MJ    General ROM Detail R shoulder 0-70 AROM, 0-100 PROM; remaining AROM B UE WFL  -EL        General UE Assessment    ROM Detail     R shoulder AROM impaired 50%  -MJ    MMT (Manual Muscle Testing)    General MMT Assessment upper extremity strength deficits identified  -EL    lower extremity strength  deficits identified  -MJ    General MMT Assessment Detail R shoulder 2+/5; remaining B UE 4-/5  -EL        Muscle Tone Assessment    Muscle Tone Assessment  Bilateral Upper Extremities;Bilateral Lower Extremities  -JAY JAY       Bilateral Upper Extremities Muscle Tone Assessment  moderately decreased tone  -JAY JAY       Bilateral Lower Extremities Muscle Tone Assessment  moderately decreased tone  -JAY JAY       Bed Mobility, Assessment/Treatment    Bed Mobility, Assistive Device bed rails;head of bed elevated  -EL   bed rails;head of bed elevated  -LM bed rails;head of bed elevated  -MJ    Bed Mobility, Scoot/Bridge, [place] supervision required;verbal cues required  -EL        Bed Mob, Supine to Sit, [place] supervision required;verbal cues required  -EL   supervision required;verbal cues required  -LM supervision required;verbal cues required  -MJ    Bed Mob, Sit to Supine, [place]    supervision required;verbal cues required  -LM supervision required;verbal cues required  -MJ    Bed Mobility, Impairments strength decreased  -EL   strength decreased  -LM strength decreased  -MJ    Bed Mobility, Comment VC for sequencing   -EL   Verbal cues for use of bedrails to assist with sitting and to place LEs on floor while sitting EOB.  -LM Verbal cues to move LEs off EOB and to use UEs to push trunk to sitting. Increased time and effort to perform  -MJ    Transfer Assessment/Treatment    Transfers, Sit-Stand [place] minimum assist (75% patient effort);verbal cues required  -EL   minimum assist (75% patient effort);verbal cues required  -LM contact guard assist;2 person assist required;verbal cues required  -MJ    Transfers, Stand-Sit [place] contact guard assist;verbal cues required  -EL   minimum assist (75% patient effort);verbal cues required  -LM contact guard assist;2 person assist required;verbal cues required  -MJ    Transfers, Sit-Stand-Sit, Assist Device rolling walker  -EL   rolling walker  -LM  rolling walker  -MJ    Transfer, Safety Issues step length decreased;weight-shifting ability decreased;loses balance backward  -EL   step length decreased;weight-shifting ability decreased;impulsivity  -LM step length decreased;weight-shifting ability decreased  -MJ    Transfer, Impairments strength decreased;impaired balance  -EL   strength decreased;impaired balance  -LM strength decreased;impaired balance  -MJ    Transfer, Comment VC for hand placement, upright posture, and weight shifting anteriorly as pt with initial LOB posteriorly upon standing; improved with cueing   -EL   Sit-stand/stand-sit x2, verbal cues required with sit-stand to push from bed, then place hands on walker. During stand-sit, verbal cues to step backwards until chair felt on back of legs, use of arm rails during sitting.  -LM Verbal cues for correct hand placement. Pt initially with posterior lean, corrected with cues  -MJ    Functional Mobility    Functional Mobility- Ind. Level minimum assist (75% patient effort);verbal cues required   CGA majority of mobility  -EL        Functional Mobility- Device rolling walker  -EL        Functional Mobility-Distance (Feet) 210  -EL        Functional Mobility- Safety Issues step length decreased;balance decreased during turns  -EL        Functional Mobility- Comment VC for upright posture and to increase step length, cueing for proper body mechanics with RW; slight LOB during turns requiring min assist to recover; otherwise CGA for mobility with RW   -EL        Therapy Exercises    Bilateral Lower Extremities    AROM:;15 reps;sitting;ankle pumps/circles;hip flexion;LAQ;other reps   hip adduction squeezes with pillow  -LM     Bilateral Upper Extremity AROM:;10 reps;sitting;elbow flexion/extension;hand pumps;shoulder extension/flexion;shoulder rolls/shrugs;shoulder abduction/adduction  -EL        Sensory Assessment/Intervention    Light Touch     LLE;RLE  -MJ    LLE Light Touch     WNL  -MJ    RLE  Light Touch     WNL  -MJ    Positioning and Restraints    Pre-Treatment Position in bed  -EL   in bed  -LM in bed  -MJ    Post Treatment Position chair  -EL   chair  -LM bed  -MJ    In Bed    notified nsg;call light within reach;exit alarm on;with family/caregiver  -LM notified nsg;supine;call light within reach;encouraged to call for assist;exit alarm on;with family/caregiver  -MJ    In Chair notified nsg;reclined;sitting;call light within reach;encouraged to call for assist;exit alarm on;with family/caregiver;legs elevated  -EL        Lower Extremity    Lower Ext Manual Muscle Testing Detail     B-L LE grossly 4/5  -MJ      06/19/17 1400 06/19/17 0800 06/18/17 2000          Rehab Evaluation    Document Type evaluation  -SG        Subjective Information no complaints  -SG        Patient Effort, Rehab Treatment good  -SG        Symptoms Noted During/After Treatment none  -SG        Muscle Tone Assessment    Muscle Tone Assessment  Bilateral Upper Extremities;Bilateral Lower Extremities  -SC Bilateral Upper Extremities;Bilateral Lower Extremities  -PT      Bilateral Upper Extremities Muscle Tone Assessment  moderately decreased tone  -SC moderately decreased tone  -PT      Bilateral Lower Extremities Muscle Tone Assessment  moderately decreased tone  -SC moderately decreased tone  -PT        User Key  (r) = Recorded By, (t) = Taken By, (c) = Cosigned By    Initials Name Effective Dates    SG Tejal Harden, MS PUJA-SLP 06/22/15 -     EDE Hoover, PT 03/14/16 -     BRIANA Palmer, ZARA 06/16/16 -     PT Yessenia Astudillo LPN 03/14/16 -     EL Elizabeth Perkins, OT 06/08/16 -     JAY JAY Powell, ZARA 02/22/17 -     LM Glendy Rodriguez, PT 06/09/17 -            Occupational Therapy Education     Title: PT OT SLP Therapies (Done)     Topic: Occupational Therapy (Done)     Point: ADL training (Done)    Description: Instruct learner(s) on proper safety adaptation and remediation techniques during self care  or transfers.   Instruct in proper use of assistive devices.    Learning Progress Summary    Learner Readiness Method Response Comment Documented by Status   Patient Acceptance E VU,NR Educated on safety for transfers, benefits of activity, and HEP for B UE strengthening.  06/21/17 1332 Done               Point: Home exercise program (Done)    Description: Instruct learner(s) on appropriate technique for monitoring, assisting and/or progressing therapeutic exercises/activities.    Learning Progress Summary    Learner Readiness Method Response Comment Documented by Status   Patient Acceptance E VU,NR Educated on safety for transfers, benefits of activity, and HEP for B UE strengthening.  06/21/17 1332 Done               Point: Body mechanics (Done)    Description: Instruct learner(s) on proper positioning and spine alignment during self-care, functional mobility activities and/or exercises.    Learning Progress Summary    Learner Readiness Method Response Comment Documented by Status   Patient Acceptance E VU,NR Educated on safety for transfers, benefits of activity, and HEP for B UE strengthening.  06/21/17 1332 Done                      User Key     Initials Effective Dates Name Provider Type Discipline     06/08/16 -  Elizabeth Perkins, OT Occupational Therapist OT                  OT Recommendation and Plan  Anticipated Discharge Disposition: home with assist, home with home health  Therapy Frequency: daily  Plan of Care Review  Plan Of Care Reviewed With: patient  Progress: progress toward functional goals as expected  Outcome Summary/Follow up Plan: Pt demo transfers and mobility with min assist and RW. Functional mobility requiring only CGA for majority, but required min assist for turns. Recommend home with assist and home health therapy upon D/C.           OT Goals       06/21/17 1334          Bed Mobility OT LTG    Bed Mobility OT LTG, Date Established 06/21/17  -      Bed Mobility OT LTG, Time to  Achieve by discharge  -EL      Bed Mobility OT LTG, Activity Type all bed mobility  -EL      Bed Mobility OT LTG, [place] Level conditional independence  -EL      Transfer Training OT LTG    Transfer Training OT LTG, Date Established 06/21/17  -EL      Transfer Training OT LTG, Time to Achieve by discharge  -EL      Transfer Training OT LTG, [place] Level contact guard assist;verbal cues required  -EL      Transfer Training OT LTG, Assist Device walker, rolling  -EL      Patient Education OT LTG    Patient Education OT LTG, Date Established 06/21/17  -EL      Patient Education OT LTG, Time to Achieve by discharge  -EL      Patient Education OT LTG, Education Type HEP;posture/body mechanics;home safety  -EL      Patient Education OT LTG, Education Understanding verbalizes understanding;demonstrates adequately  -EL      Functional Mobility OT LTG    Functional Mobility Goal OT LTG, Date Established 06/21/17  -EL      Functional Mobility Goal OT LTG, Time to Achieve by discharge  -EL      Functional Mobility Goal OT LTG, [place] Level contact guard  -EL      Functional Mobility Goal OT LTG, Assist Device rolling walker  -EL      Functional Mobility Goal OT LTG, Distance to Achieve in hallway;to the bathroom  -EL        User Key  (r) = Recorded By, (t) = Taken By, (c) = Cosigned By    Initials Name Provider Type    KARINE Perkins OT Occupational Therapist                Outcome Measures       06/21/17 1129 06/20/17 1054 06/19/17 1520    How much help from another person do you currently need...    Turning from your back to your side while in flat bed without using bedrails?  3  -LM 3  -MJ    Moving from lying on back to sitting on the side of a flat bed without bedrails?  3  -LM 3  -MJ    Moving to and from a bed to a chair (including a wheelchair)?  3  -LM 3  -MJ    Standing up from a chair using your arms (e.g., wheelchair, bedside chair)?  3  -LM 3  -MJ    Climbing 3-5 steps with a railing?  2  -LM  2  -MJ    To walk in hospital room?  3  -LM 2  -MJ    AM-PAC 6 Clicks Score  17  -LM 16  -MJ    How much help from another is currently needed...    Putting on and taking off regular lower body clothing? 3  -EL      Bathing (including washing, rinsing, and drying) 2  -EL      Toileting (which includes using toilet bed pan or urinal) 3  -EL      Putting on and taking off regular upper body clothing 3  -EL      Taking care of personal grooming (such as brushing teeth) 3  -EL      Eating meals 3  -EL      Score 17  -EL      Functional Assessment    Outcome Measure Options AM-PAC 6 Clicks Daily Activity (OT)  -EL AM-PAC 6 Clicks Basic Mobility (PT)  -LM AM-PAC 6 Clicks Basic Mobility (PT)  -      User Key  (r) = Recorded By, (t) = Taken By, (c) = Cosigned By    Initials Name Provider Type    EDE Hoover, PT Physical Therapist    KARINE Perkins, OT Occupational Therapist    RACHEL Rodriguez, PT Physical Therapist          Time Calculation:   OT Start Time: 1129             Elizabeth Perkins OT  6/21/2017

## 2017-06-21 NOTE — PLAN OF CARE
Problem: Patient Care Overview (Adult)  Goal: Plan of Care Review  Outcome: Ongoing (interventions implemented as appropriate)    06/21/17 0432   Coping/Psychosocial Response Interventions   Plan Of Care Reviewed With patient   Patient Care Overview   Progress no change   Outcome Evaluation   Outcome Summary/Follow up Plan pt resting comfortably. VSS. no c/o pain.          Problem: Fall Risk (Adult)  Goal: Absence of Falls  Outcome: Ongoing (interventions implemented as appropriate)    06/21/17 0432   Fall Risk (Adult)   Absence of Falls making progress toward outcome         Problem: Pressure Ulcer Risk (Rosalino Scale) (Adult,Obstetrics,Pediatric)  Goal: Skin Integrity  Outcome: Ongoing (interventions implemented as appropriate)    06/21/17 0432   Pressure Ulcer Risk (Rosalino Scale) (Adult,Obstetrics,Pediatric)   Skin Integrity making progress toward outcome         Problem: Skin Integrity Impairment, Risk/Actual (Adult)  Goal: Skin Integrity/Wound Healing  Outcome: Ongoing (interventions implemented as appropriate)    06/21/17 0432   Skin Integrity Impairment, Risk/Actual (Adult)   Skin Integrity/Wound Healing making progress toward outcome         Problem: Perioperative Period (Adult)  Goal: Signs and Symptoms of Listed Potential Problems Will be Absent or Manageable (Perioperative Period)  Outcome: Ongoing (interventions implemented as appropriate)    06/21/17 0432   Perioperative Period   Problems Assessed (Perioperative Period) all   Problems Present (Perioperative Period) none

## 2017-06-21 NOTE — DISCHARGE SUMMARY
"    [facility] Medicine Services  DISCHARGE SUMMARY       Date of Admission: 6/13/2017  Date of Discharge:  6/21/2017  Primary Care Physician: Blake Gomez MD  Consulting Physician(s)          None           Discharge Diagnoses:  Active Hospital Problems (** Indicates Principal Problem)    Diagnosis Date Noted   • **Incarcerated left inguinal hernia [K40.30] 06/11/2017   • Late onset Alzheimer's disease without behavioral disturbance [G30.1, F02.80] 11/08/2016   • Seizure disorder [G40.909] 11/08/2016     a. Recurrent symptoms from May 2014 to May 2015.         • Chronic atrial fibrillation [I48.2] 07/20/2016     b. Initial occurrence postoperative CABG.  c. Recurrence, 2012 in setting of pneumonia.   d. CHADS II score of 2.       • Essential hypertension [I10] 07/20/2016      Resolved Hospital Problems    Diagnosis Date Noted Date Resolved   No resolved problems to display.       Presenting Problem/History of Present Illness  Incarcerated hernia [K46.0]     Chief Complaint on Day of Discharge:  \"wobbly\" per wife.    History of Present Illness on Day of Discharge:  Mr Lyons worked with PT yest and walked 72 feet, rising to his feet with min assist.  After discussion, his wife prefers to take him home rather than to SNF.     Hospital Course  Patient is a 89 y.o. male presented with nausea and abd pain.  He was found to have an incarcerated left inguinal hernia and underwent surgery without complication.  He recovered well.    However, he was weak postop and discussion of inpatient rehab was held.  In the end his wife preferred to take him home with home health.  He has restarted his eliquis with no bleeding apparent.    He has dysphagia but his wife agreed with comfort feeds rather than PEG.  Recommendations were made for a level-3 dysphagia diet for maximum safety.    Procedures Performed  Procedure(s):  LEFT INGUINAL HERNIA REPAIR       Consults:   Consults     Date and Time Order Name " "Status Description    6/13/2017 1528 Inpatient Consult to General Surgery Completed           Pertinent Test Results:    Results from last 7 days  Lab Units 06/19/17  0605   WBC 10*3/mm3 10.58   HEMOGLOBIN g/dL 14.6   HEMATOCRIT % 44.9   PLATELETS 10*3/mm3 303       Results from last 7 days  Lab Units 06/17/17  0516 06/16/17  0535   SODIUM mmol/L  --  137   POTASSIUM mmol/L 4.3 3.5   CHLORIDE mmol/L  --  103   TOTAL CO2 mmol/L  --  27.0   BUN mg/dL  --  12   CREATININE mg/dL  --  0.80   GLUCOSE mg/dL  --  84   CALCIUM mg/dL  --  8.9           Condition on Discharge:  fair    Physical Exam on Discharge:/68 (BP Location: Left arm, Patient Position: Lying)  Pulse 72  Temp 97.8 °F (36.6 °C) (Oral)   Resp 18  Ht 67\" (170.2 cm)  Wt 102 lb (46.3 kg)  SpO2 97%  BMI 15.98 kg/m2  Physical Exam  Gen: pleasantly demented cachectic man, NAD in bed, says he feels fine.  Neuro: alert and not oriented, clear speech, follows commands, grossly nonfocal  HEENT:  NC/AT PERRL, OP benign  Neck:  Supple, no LAD  Heart irreg irreg  no murmur, rub, or gallop  Lungs CTA nonlabored  Abd:  Soft, nontender, no rebound or guarding, pos BS, incision dressed  Extrem:  No c/c/e  Skin warm and dry    Discharge Disposition  Home or Self Care    Discharge Medications   Paul Lyons   Home Medication Instructions KAYLIE:[id number]    Printed on:06/21/17 1135   Medication Information                      acetaminophen-codeine (TYLENOL #3) 300-30 MG per tablet  Take 1 tablet by mouth Daily As Needed.             apixaban (ELIQUIS) 2.5 MG tablet tablet  Take 1 tablet by mouth 2 (two) times a day.             aspirin (ASPIRIN LOW DOSE) 81 MG tablet  Take 81 mg by mouth Every Night.             dorzolamide (TRUSOPT) 2 % ophthalmic solution  Administer 1 drop to the right eye 2 (Two) Times a Day.             EPINEPHrine (EPIPEN 2-ANKITA) 0.3 MG/0.3ML solution auto-injector injection  EpiPen 2-Ankita 0.3 MG/0.3ML Injection Solution Auto-injector; " Patient Sig: EpiPen 2-Dylan 0.3 MG/0.3ML Injection Solution Auto-injector ; 2; 0; 29-Apr-2014; Active             levETIRAcetam (KEPPRA) 500 MG tablet  Take 1 tablet by mouth 3 (Three) Times a Day.             lisinopril (PRINIVIL,ZESTRIL) 10 MG tablet  Take 20 mg by mouth Daily.             memantine (NAMENDA) 5 MG tablet  Take 1 tablet by mouth 2 (Two) Times a Day.             pravastatin (PRAVACHOL) 40 MG tablet  Take 40 mg by mouth daily.             thiamine (VITAMINE B-1) 100 MG tablet  Take 100 mg by mouth Daily.             timolol (TIMOPTIC) 0.25 % ophthalmic solution  Administer 1 drop to the right eye Daily.             travoprost, [place] free, (TRAVATAN Z) 0.004 % solution ophthalmic solution  Apply 1 drop to eye Every Night.                 Discharge Diet:  per routine    Discharge Care Plan / Instructions:  Home heatlh and home PT.  Careful of falls.      Activity at Discharge:  as tolerated with walker    Follow-up Appointments  Future Appointments  Date Time Provider Department Center   5/10/2018 9:15 AM Ender Garcia MD [facility] None     Additional Instructions for the Follow-ups that You Need to Schedule     Ambulatory Referral to Home Health    As directed    Face to Face Visit Date:  6/20/2017   Follow-up Provider for Plan of Care?:  I treated the patient in an acute care facility and will not continue treatment after discharge.   Follow-up Provider:  TRACIE STOVER   Reason/Clinical Findings:  generalized weakness, dysphagia   Describe mobility limitations that make leaving home difficult:  generalized weakness, dysphagia   Nursing/Therapeutic Services Requested:   Skilled Nursing  Physical Therapy  Occupational Therapy  Speech Therapy      Skilled nursing orders:   Medication education  Cardiopulmonary assessments      PT orders:   Therapeutic exercise  Gait Training  Transfer training  Strengthening  Home safety assessment      Weight Bearing Status:  Full Weight Bearing    Occupational orders:   Activities of daily living  Energy conservation  Strengthening  Cognition  Fine motor  Home safety assessment      SLP orders:  Dysphagia therapy             Dr Grey 2 weeks    Test Results Pending at Discharge       Le Mann MD 06/21/17 11:35 AM    Time: 45 mins    Please note that portions of this note may have been completed with a voice recognition program. Efforts were made to edit the dictations, but occasionally words are mistranscribed.

## 2017-06-21 NOTE — PLAN OF CARE
Problem: Patient Care Overview (Adult)  Goal: Plan of Care Review  Outcome: Ongoing (interventions implemented as appropriate)    06/21/17 1334   Coping/Psychosocial Response Interventions   Plan Of Care Reviewed With patient   Patient Care Overview   Progress progress toward functional goals as expected   Outcome Evaluation   Outcome Summary/Follow up Plan Pt demo transfers and mobility with min assist and RW. Functional mobility requiring only CGA for majority, but required min assist for turns. Recommend home with assist and home health therapy upon D/C.          Problem: Inpatient Occupational Therapy  Goal: Bed Mobility Goal LTG- OT  Outcome: Ongoing (interventions implemented as appropriate)    06/21/17 1334   Bed Mobility OT LTG   Bed Mobility OT LTG, Date Established 06/21/17   Bed Mobility OT LTG, Time to Achieve by discharge   Bed Mobility OT LTG, Activity Type all bed mobility   Bed Mobility OT LTG, [place] Level conditional independence       Goal: Transfer Training Goal 1 LTG- OT  Outcome: Ongoing (interventions implemented as appropriate)    06/21/17 1334   Transfer Training OT LTG   Transfer Training OT LTG, Date Established 06/21/17   Transfer Training OT LTG, Time to Achieve by discharge   Transfer Training OT LTG, [place] Level contact guard assist;verbal cues required   Transfer Training OT LTG, Assist Device walker, rolling       Goal: Patient Education Goal LTG- OT  Outcome: Ongoing (interventions implemented as appropriate)    06/21/17 1334   Patient Education OT LTG   Patient Education OT LTG, Date Established 06/21/17   Patient Education OT LTG, Time to Achieve by discharge   Patient Education OT LTG, Education Type HEP;posture/body mechanics;home safety   Patient Education OT LTG, Education Understanding verbalizes understanding;demonstrates adequately       Goal: Functional Mobility Goal LTG- OT  Outcome: Ongoing (interventions implemented as appropriate)    06/21/17 1334    Functional Mobility OT LTG   Functional Mobility Goal OT LTG, Date Established 06/21/17   Functional Mobility Goal OT LTG, Time to Achieve by discharge   Functional Mobility Goal OT LTG, [place] Level contact guard   Functional Mobility Goal OT LTG, Assist Device rolling walker   Functional Mobility Goal OT LTG, Distance to Achieve in hallway;to the bathroom

## 2017-06-21 NOTE — PROGRESS NOTES
"Adult Nutrition  Assessment/PES    Patient Name:  Paul Lyons  YOB: 1927  MRN: [id number]  Admit Date:  6/13/2017    Assessment Date:  6/21/2017        Reason for Assessment       06/21/17 1128    Reason for Assessment    Reason For Assessment/Visit follow up protocol    Time Spent (min) 20    Diagnosis Diagnosis   per notes this admission                Nutrition/Diet History       06/21/17 1128    Nutrition/Diet History    Reported/Observed By Patient;Family    Food Habit/Preferences Uses Supplements    Other Patient and family in room at time of visit. Family reports patient ate well this morning for breakfast. Observed patients tray, ate ~75%. Patient ate all of boost pudding. Reports have not yet tried magic cup but may be d/c this afternoon.            Anthropometrics       06/21/17 1129    Anthropometrics (Special Considerations)    Height Used for Calculations 1.702 m (5' 7\")    Weight Used for Calculations 46.3 kg (102 lb 1.2 oz)            Labs/Tests/Procedures/Meds       06/21/17 1129    Labs/Tests/Procedures/Meds    Labs/Tests Review Reviewed    Procedure Review SLP   SLP 6/20 \"Attempted dys tx, but pt not alert despite max verbal and tactile cues.\"                Nutrition Prescription Ordered       06/21/17 1129    Nutrition Prescription PO    Current PO Diet Dysphagia    Dysphagia Level 3  Pureed with some mashed    Fluid Consistency Nectar/syrup thick   no straws            Evaluation of Received Nutrient/Fluid Intake       06/21/17 1130    PO Evaluation    Number of Meals 4    % PO Intake 19%   patient using boost pudding supplement          Problem/Interventions:        Problem 1       06/21/17 1133    Nutrition Diagnoses Problem 1    Problem 1 Inadequate Intake/Infusion    Inadequate Intake Type Oral    Etiology (related to) --   clinical condition    Signs/Symptoms (evidenced by) PO Intake    Percent (%) intake recorded 19 %    Over number of meals 4   patient also using " supplements                Intervention Goal       06/21/17 1134    Intervention Goal    General Nutrition support treatment    PO Increase intake            Nutrition Intervention       06/21/17 1134    Nutrition Intervention    RD/Tech Action Encourage intake;Follow Tx progress;Care plan reviewd;Advise alternate selection   called  to bring magic cup supplement with lunch tray              Education/Evaluation       06/21/17 1134    Monitor/Evaluation    Monitor Per protocol;PO intake;Supplement intake          Electronically signed by:  Rema Collins  06/21/17 11:34 AM

## 2017-06-22 NOTE — THERAPY DISCHARGE NOTE
Acute Care - Occupational Therapy Discharge Summary  [facility]     Patient Name: Paul Lyons  : 1927  MRN: [id number]    Today's Date: 2017  Onset of Illness/Injury or Date of Surgery Date: 17    Date of Referral to OT: 17  Referring Physician: MD Mackenzie       Admit Date: 2017        OT Recommendation and Plan    Visit Dx:    ICD-10-CM ICD-9-CM   1. Oropharyngeal dysphagia R13.12 787.22   2. Inguinal hernia K40.90 550.90   3. Impaired functional mobility, balance, gait, and endurance Z74.09 V49.89   4. Hearing impaired, unspecified laterality H91.90 389.9   5. Incarcerated left inguinal hernia K40.30 550.10   6. Ataxia R27.0 781.3   7. Weakness R53.1 780.79   8. Impaired mobility and ADLs Z74.09 799.89                     OT Goals       17 1334          Bed Mobility OT LTG    Bed Mobility OT LTG, Date Established 17  -EL      Bed Mobility OT LTG, Time to Achieve by discharge  -EL      Bed Mobility OT LTG, Activity Type all bed mobility  -EL      Bed Mobility OT LTG, [place] Level conditional independence  -EL      Transfer Training OT LTG    Transfer Training OT LTG, Date Established 17  -EL      Transfer Training OT LTG, Time to Achieve by discharge  -EL      Transfer Training OT LTG, [place] Level contact guard assist;verbal cues required  -EL      Transfer Training OT LTG, Assist Device walker, rolling  -EL      Patient Education OT LTG    Patient Education OT LTG, Date Established 17  -EL      Patient Education OT LTG, Time to Achieve by discharge  -EL      Patient Education OT LTG, Education Type HEP;posture/body mechanics;home safety  -EL      Patient Education OT LTG, Education Understanding verbalizes understanding;demonstrates adequately  -EL      Functional Mobility OT LTG    Functional Mobility Goal OT LTG, Date Established 17  -EL      Functional Mobility Goal OT LTG, Time to Achieve by discharge  -EL      Functional Mobility  Goal OT LTG, [place] Level contact guard  -EL      Functional Mobility Goal OT LTG, Assist Device rolling walker  -EL      Functional Mobility Goal OT LTG, Distance to Achieve in hallway;to the bathroom  -EL        User Key  (r) = Recorded By, (t) = Taken By, (c) = Cosigned By    Initials Name Provider Type    KARINE Perkins OT Occupational Therapist                Outcome Measures       06/21/17 1129 06/20/17 1054       How much help from another person do you currently need...    Turning from your back to your side while in flat bed without using bedrails?  3  -LM     Moving from lying on back to sitting on the side of a flat bed without bedrails?  3  -LM     Moving to and from a bed to a chair (including a wheelchair)?  3  -LM     Standing up from a chair using your arms (e.g., wheelchair, bedside chair)?  3  -LM     Climbing 3-5 steps with a railing?  2  -LM     To walk in hospital room?  3  -LM     AM-PAC 6 Clicks Score  17  -LM     How much help from another is currently needed...    Putting on and taking off regular lower body clothing? 3  -EL      Bathing (including washing, rinsing, and drying) 2  -EL      Toileting (which includes using toilet bed pan or urinal) 3  -EL      Putting on and taking off regular upper body clothing 3  -EL      Taking care of personal grooming (such as brushing teeth) 3  -EL      Eating meals 3  -EL      Score 17  -EL      Functional Assessment    Outcome Measure Options AM-PAC 6 Clicks Daily Activity (OT)  -EL AM-PAC 6 Clicks Basic Mobility (PT)  -LM       User Key  (r) = Recorded By, (t) = Taken By, (c) = Cosigned By    Initials Name Provider Type    KARINE Perkins OT Occupational Therapist    LM Glendy Rodriguez, PT Physical Therapist              OT Discharge Summary  Reason for Discharge: Discharge from facility  Outcomes Achieved: Refer to plan of care for updates on goals achieved  Discharge Destination: Home      Jaki Pace OT  6/22/2017

## 2017-07-26 RX ORDER — APIXABAN 2.5 MG/1
TABLET, FILM COATED ORAL
Qty: 60 TABLET | Refills: 0 | Status: SHIPPED | OUTPATIENT
Start: 2017-07-26 | End: 2017-08-28 | Stop reason: SDUPTHER

## 2017-08-01 ENCOUNTER — OFFICE VISIT (OUTPATIENT)
Dept: CARDIOLOGY | Facility: CLINIC | Age: 82
End: 2017-08-01

## 2017-08-01 VITALS
HEIGHT: 67 IN | SYSTOLIC BLOOD PRESSURE: 114 MMHG | HEART RATE: 61 BPM | WEIGHT: 100.5 LBS | BODY MASS INDEX: 15.78 KG/M2 | DIASTOLIC BLOOD PRESSURE: 57 MMHG

## 2017-08-01 DIAGNOSIS — I25.10 CORONARY ARTERY DISEASE INVOLVING NATIVE CORONARY ARTERY OF NATIVE HEART WITHOUT ANGINA PECTORIS: Primary | ICD-10-CM

## 2017-08-01 DIAGNOSIS — I48.20 CHRONIC ATRIAL FIBRILLATION (HCC): ICD-10-CM

## 2017-08-01 DIAGNOSIS — E78.5 DYSLIPIDEMIA: ICD-10-CM

## 2017-08-01 PROCEDURE — 99213 OFFICE O/P EST LOW 20 MIN: CPT | Performed by: NURSE PRACTITIONER

## 2017-08-01 NOTE — PROGRESS NOTES
Subjective:     Encounter Date:08/01/2017      Patient ID: Paul Lyons is a 89 y.o. male.    Chief Complaint:Coronary Artery Disease; chronic atrial fibrilation; and essential hypertension    PROBLEM LIST:  1. Coronary artery disease:  a. March 2002, abnormal GXT cardiac catheterization, Dr. Hancock, 90% RCA, 70% proximal circumflex, 80% first obtuse marginal, and a 95% LAD with a normal LVEF.  b. On 04/15/02, coronary artery bypass grafting by Dr. Baker x4 with saphenous vein graft to the OM/lateral circumflex, saphenous vein graft to the PDA, and LIMA to the LAD.  i. Noted postoperative atrial fibrillation.    c. February 2004, Cardiolite negative for ischemia, EF of 64%.  d. July 2007, stress test negative for ischemia, read by Dr. Powell.    2. Paroxysmal atrial fibrillation:  a. Initial occurrence postoperative CABG.  b. Recurrence, 2012 in setting of pneumonia.   c. CHADS II score of 2.    3. TIA, February 2013.  a. Carotid duplex 50% right ICA.  4. Possible dementia.  5. Hypertension.  6. Hyperlipidemia.  7. Chronic left upper lobe nodule followed by Dr. Baker.   8. Seizure disorder:  a. Recurrent symptoms from May 2014 to May 2015.    9. History of skin cancer with removal.  10. BPH.  11. Glaucoma.  12. Status post cataract removal with lens implant.   13. GERD/hiatal hernia.  14. Obstructive sleep apnea with CPAP device.  15. Inguinal hernia repair x 2  16. February 2012, lumbar laminectomy.  17. History of esophageal stricture with noted dilatation.        Allergies   Allergen Reactions   • Bee Venom    • Lipitor [Atorvastatin] Myalgia         Current Outpatient Prescriptions:   •  acetaminophen-codeine (TYLENOL #3) 300-30 MG per tablet, Take 1 tablet by mouth Daily As Needed., Disp: , Rfl:   •  aspirin (ASPIRIN LOW DOSE) 81 MG tablet, Take 81 mg by mouth Every Night., Disp: , Rfl:   •  ELIQUIS 2.5 MG tablet tablet, TAKE 1 TABLET 2 TIMES A DAY, Disp: 60 tablet, Rfl: 0  •  EPINEPHrine (EPIPEN 2-ANKITA) 0.3  MG/0.3ML solution auto-injector injection, EpiPen 2-Dylan 0.3 MG/0.3ML Injection Solution Auto-injector; Patient Sig: EpiPen 2-Dylan 0.3 MG/0.3ML Injection Solution Auto-injector ; 2; 0; 29-Apr-2014; Active, Disp: , Rfl:   •  levETIRAcetam (KEPPRA) 500 MG tablet, Take 1 tablet by mouth 3 (Three) Times a Day., Disp: 270 tablet, Rfl: 3  •  lisinopril (PRINIVIL,ZESTRIL) 10 MG tablet, Take 20 mg by mouth Daily., Disp: , Rfl:   •  memantine (NAMENDA) 5 MG tablet, Take 1 tablet by mouth 2 (Two) Times a Day., Disp: 180 tablet, Rfl: 3  •  pravastatin (PRAVACHOL) 40 MG tablet, Take 40 mg by mouth daily., Disp: , Rfl:   •  thiamine (VITAMINE B-1) 100 MG tablet, Take 100 mg by mouth Daily., Disp: , Rfl:   •  timolol (TIMOPTIC) 0.25 % ophthalmic solution, Administer 1 drop to the right eye Daily., Disp: , Rfl:   •  travoprost, [place] free, (TRAVATAN Z) 0.004 % solution ophthalmic solution, Apply 1 drop to eye Every Night., Disp: , Rfl:         History of Present Illness    Patient returns today for hospital follow-up status post post surgical intervention for an incarcerated inguinal hernia.  Patient has his daughter give much of the history as she has that he is somewhat poor story and.  Notes that he did well with surgery.  Since then has had no cardiac issues.  No chest pain, shortness of breath, syncope, near syncope.  Posthospitalization he also had some aspiration pneumonia and has been getting therapy regarding this.  Currently it is resolved.  No bleeding issues.  His Eliquis therapy was decreased to 2-1/2 mg twice daily at discharge.    The following portions of the patient's history were reviewed and updated as appropriate: allergies, current medications, past family history, past medical history, past social history, past surgical history and problem list.      Social History   Substance Use Topics   • Smoking status: Former Smoker   • Smokeless tobacco: None      Comment: QUIT MANY YEARS AGO   • Alcohol use No  "        Review of Systems   Constitution: Negative.   Cardiovascular: Negative.    Respiratory: Negative.    Hematologic/Lymphatic: Negative for bleeding problem. Does not bruise/bleed easily.   Skin: Negative for rash.   Musculoskeletal: Negative for muscle weakness and myalgias.   Gastrointestinal: Negative for heartburn, nausea and vomiting.   Neurological: Negative.           Objective:    height is 67\" (170.2 cm) and weight is 100 lb 8 oz (45.6 kg). His blood pressure is 114/57 and his pulse is 61.         Physical Exam   Constitutional: He is oriented to person, place, and time. He appears well-developed. No distress.   Neck: No JVD present. No tracheal deviation present.   Cardiovascular: Normal rate, regular rhythm and normal heart sounds.  Exam reveals no friction rub.    No murmur heard.  Pulmonary/Chest: Effort normal and breath sounds normal. No respiratory distress.   Abdominal: Soft. Bowel sounds are normal. There is no tenderness.   Musculoskeletal: He exhibits no edema or deformity.   Neurological: He is alert and oriented to person, place, and time.   Skin: Skin is warm and dry.       Procedures          Assessment:   Assessment/Plan      Paul was seen today for coronary artery disease, chronic atrial fibrilation and essential hypertension.    Diagnoses and all orders for this visit:    Coronary artery disease involving native coronary artery of native heart without angina pectoris    Chronic atrial fibrillation, on chronic anticoagulation    Dyslipidemia, on statin therapy      Plan:    At this time we'll make no changes to the patient's medication regimen.  Reassured his daughter today that he is on the appropriate dose of Eliquis therapy.  We'll see him back on an as-needed basis.       Mira WOOD     Dictated utilizing Dragon dictation  "

## 2017-08-28 RX ORDER — APIXABAN 2.5 MG/1
TABLET, FILM COATED ORAL
Qty: 60 TABLET | Refills: 6 | Status: SHIPPED | OUTPATIENT
Start: 2017-08-28 | End: 2017-11-30 | Stop reason: HOSPADM

## 2017-08-30 ENCOUNTER — TELEPHONE (OUTPATIENT)
Dept: CARDIAC SURGERY | Facility: CLINIC | Age: 82
End: 2017-08-30

## 2017-08-30 NOTE — TELEPHONE ENCOUNTER
Called pt about f/u and wife stated she was under the ingression that Mr. Lyons did not need a f/u and was released from care. I will seek advice from Dr. Baker as to what he wants.

## 2017-11-22 ENCOUNTER — HOSPITAL ENCOUNTER (INPATIENT)
Facility: HOSPITAL | Age: 82
LOS: 8 days | Discharge: NURSING, REHAB OR LONG-TERM CARE | End: 2017-11-30
Attending: HOSPITALIST | Admitting: HOSPITALIST

## 2017-11-22 DIAGNOSIS — R13.12 OROPHARYNGEAL DYSPHAGIA: ICD-10-CM

## 2017-11-22 DIAGNOSIS — Z74.09 IMPAIRED MOBILITY AND ADLS: ICD-10-CM

## 2017-11-22 DIAGNOSIS — Z74.09 IMPAIRED FUNCTIONAL MOBILITY, BALANCE, GAIT, AND ENDURANCE: Primary | ICD-10-CM

## 2017-11-22 DIAGNOSIS — R13.10 DYSPHAGIA: ICD-10-CM

## 2017-11-22 DIAGNOSIS — Z78.9 IMPAIRED MOBILITY AND ADLS: ICD-10-CM

## 2017-11-22 PROBLEM — K80.50 COMMON BILE DUCT STONE: Status: ACTIVE | Noted: 2017-11-22

## 2017-11-22 PROBLEM — K83.1 COMMON BILE DUCT (CBD) OBSTRUCTION: Status: ACTIVE | Noted: 2017-11-22

## 2017-11-22 LAB
ALBUMIN SERPL-MCNC: 4.2 G/DL (ref 3.2–4.8)
ALBUMIN/GLOB SERPL: 1.9 G/DL (ref 1.5–2.5)
ALP SERPL-CCNC: 110 U/L (ref 25–100)
ALT SERPL W P-5'-P-CCNC: 11 U/L (ref 7–40)
AMYLASE SERPL-CCNC: 283 U/L (ref 30–118)
ANION GAP SERPL CALCULATED.3IONS-SCNC: 7 MMOL/L (ref 3–11)
AST SERPL-CCNC: 16 U/L (ref 0–33)
BASOPHILS # BLD AUTO: 0.07 10*3/MM3 (ref 0–0.2)
BASOPHILS NFR BLD AUTO: 1.4 % (ref 0–1)
BILIRUB SERPL-MCNC: 0.3 MG/DL (ref 0.3–1.2)
BUN BLD-MCNC: 14 MG/DL (ref 9–23)
BUN/CREAT SERPL: 17.5 (ref 7–25)
CALCIUM SPEC-SCNC: 8.9 MG/DL (ref 8.7–10.4)
CHLORIDE SERPL-SCNC: 109 MMOL/L (ref 99–109)
CO2 SERPL-SCNC: 27 MMOL/L (ref 20–31)
CREAT BLD-MCNC: 0.8 MG/DL (ref 0.6–1.3)
DEPRECATED RDW RBC AUTO: 40.9 FL (ref 37–54)
EOSINOPHIL # BLD AUTO: 0.24 10*3/MM3 (ref 0–0.3)
EOSINOPHIL NFR BLD AUTO: 4.7 % (ref 0–3)
ERYTHROCYTE [DISTWIDTH] IN BLOOD BY AUTOMATED COUNT: 12.7 % (ref 11.3–14.5)
GFR SERPL CREATININE-BSD FRML MDRD: 91 ML/MIN/1.73
GLOBULIN UR ELPH-MCNC: 2.2 GM/DL
GLUCOSE BLD-MCNC: 101 MG/DL (ref 70–100)
HCT VFR BLD AUTO: 42.4 % (ref 38.9–50.9)
HGB BLD-MCNC: 13.7 G/DL (ref 13.1–17.5)
IMM GRANULOCYTES # BLD: 0.01 10*3/MM3 (ref 0–0.03)
IMM GRANULOCYTES NFR BLD: 0.2 % (ref 0–0.6)
LIPASE SERPL-CCNC: 377 U/L (ref 6–51)
LYMPHOCYTES # BLD AUTO: 0.95 10*3/MM3 (ref 0.6–4.8)
LYMPHOCYTES NFR BLD AUTO: 18.7 % (ref 24–44)
MCH RBC QN AUTO: 28.7 PG (ref 27–31)
MCHC RBC AUTO-ENTMCNC: 32.3 G/DL (ref 32–36)
MCV RBC AUTO: 88.9 FL (ref 80–99)
MONOCYTES # BLD AUTO: 0.53 10*3/MM3 (ref 0–1)
MONOCYTES NFR BLD AUTO: 10.4 % (ref 0–12)
NEUTROPHILS # BLD AUTO: 3.28 10*3/MM3 (ref 1.5–8.3)
NEUTROPHILS NFR BLD AUTO: 64.6 % (ref 41–71)
PLATELET # BLD AUTO: 269 10*3/MM3 (ref 150–450)
PMV BLD AUTO: 9.2 FL (ref 6–12)
POTASSIUM BLD-SCNC: 4.8 MMOL/L (ref 3.5–5.5)
PROT SERPL-MCNC: 6.4 G/DL (ref 5.7–8.2)
RBC # BLD AUTO: 4.77 10*6/MM3 (ref 4.2–5.76)
SODIUM BLD-SCNC: 143 MMOL/L (ref 132–146)
WBC NRBC COR # BLD: 5.08 10*3/MM3 (ref 3.5–10.8)

## 2017-11-22 PROCEDURE — 80053 COMPREHEN METABOLIC PANEL: CPT | Performed by: NURSE PRACTITIONER

## 2017-11-22 PROCEDURE — 85025 COMPLETE CBC W/AUTO DIFF WBC: CPT | Performed by: NURSE PRACTITIONER

## 2017-11-22 PROCEDURE — 83690 ASSAY OF LIPASE: CPT | Performed by: NURSE PRACTITIONER

## 2017-11-22 PROCEDURE — 82150 ASSAY OF AMYLASE: CPT | Performed by: NURSE PRACTITIONER

## 2017-11-22 PROCEDURE — 99223 1ST HOSP IP/OBS HIGH 75: CPT | Performed by: HOSPITALIST

## 2017-11-22 RX ORDER — FERROUS SULFATE 325(65) MG
325 TABLET ORAL 2 TIMES DAILY
Status: DISCONTINUED | OUTPATIENT
Start: 2017-11-22 | End: 2017-11-30 | Stop reason: HOSPADM

## 2017-11-22 RX ORDER — ONDANSETRON 4 MG/1
4 TABLET, FILM COATED ORAL EVERY 6 HOURS PRN
Status: DISCONTINUED | OUTPATIENT
Start: 2017-11-22 | End: 2017-11-30 | Stop reason: HOSPADM

## 2017-11-22 RX ORDER — DORZOLAMIDE HCL 20 MG/ML
1 SOLUTION/ DROPS OPHTHALMIC 3 TIMES DAILY
COMMUNITY

## 2017-11-22 RX ORDER — ASPIRIN 81 MG/1
81 TABLET, CHEWABLE ORAL NIGHTLY
Status: DISCONTINUED | OUTPATIENT
Start: 2017-11-22 | End: 2017-11-30 | Stop reason: HOSPADM

## 2017-11-22 RX ORDER — ACETAMINOPHEN 325 MG/1
650 TABLET ORAL EVERY 4 HOURS PRN
Status: DISCONTINUED | OUTPATIENT
Start: 2017-11-22 | End: 2017-11-30 | Stop reason: HOSPADM

## 2017-11-22 RX ORDER — GLIMEPIRIDE 2 MG/1
1 TABLET ORAL DAILY
Status: DISCONTINUED | OUTPATIENT
Start: 2017-11-23 | End: 2017-11-30 | Stop reason: HOSPADM

## 2017-11-22 RX ORDER — LISINOPRIL 20 MG/1
20 TABLET ORAL DAILY
Status: DISCONTINUED | OUTPATIENT
Start: 2017-11-23 | End: 2017-11-30 | Stop reason: HOSPADM

## 2017-11-22 RX ORDER — TRAMADOL HYDROCHLORIDE 50 MG/1
50 TABLET ORAL EVERY 6 HOURS PRN
Status: DISCONTINUED | OUTPATIENT
Start: 2017-11-22 | End: 2017-11-30 | Stop reason: HOSPADM

## 2017-11-22 RX ORDER — DOCUSATE SODIUM 100 MG/1
100 CAPSULE, LIQUID FILLED ORAL 2 TIMES DAILY
Status: DISCONTINUED | OUTPATIENT
Start: 2017-11-22 | End: 2017-11-30 | Stop reason: HOSPADM

## 2017-11-22 RX ORDER — SODIUM CHLORIDE 0.9 % (FLUSH) 0.9 %
1-10 SYRINGE (ML) INJECTION AS NEEDED
Status: DISCONTINUED | OUTPATIENT
Start: 2017-11-22 | End: 2017-11-30 | Stop reason: HOSPADM

## 2017-11-22 RX ORDER — LEVETIRACETAM 500 MG/1
500 TABLET ORAL 3 TIMES DAILY
Status: DISCONTINUED | OUTPATIENT
Start: 2017-11-22 | End: 2017-11-30 | Stop reason: HOSPADM

## 2017-11-22 RX ORDER — DORZOLAMIDE HCL 20 MG/ML
1 SOLUTION/ DROPS OPHTHALMIC 3 TIMES DAILY
Status: DISCONTINUED | OUTPATIENT
Start: 2017-11-22 | End: 2017-11-30 | Stop reason: HOSPADM

## 2017-11-22 RX ORDER — ONDANSETRON 2 MG/ML
4 INJECTION INTRAMUSCULAR; INTRAVENOUS EVERY 6 HOURS PRN
Status: DISCONTINUED | OUTPATIENT
Start: 2017-11-22 | End: 2017-11-30 | Stop reason: HOSPADM

## 2017-11-22 RX ORDER — THIAMINE MONONITRATE (VIT B1) 100 MG
100 TABLET ORAL DAILY
Status: DISCONTINUED | OUTPATIENT
Start: 2017-11-23 | End: 2017-11-30 | Stop reason: HOSPADM

## 2017-11-22 RX ORDER — MEMANTINE HYDROCHLORIDE 10 MG/1
5 TABLET ORAL EVERY 12 HOURS SCHEDULED
Status: DISCONTINUED | OUTPATIENT
Start: 2017-11-22 | End: 2017-11-30 | Stop reason: HOSPADM

## 2017-11-22 RX ORDER — LATANOPROST 50 UG/ML
1 SOLUTION/ DROPS OPHTHALMIC NIGHTLY
Status: DISCONTINUED | OUTPATIENT
Start: 2017-11-22 | End: 2017-11-30 | Stop reason: HOSPADM

## 2017-11-22 RX ORDER — FERROUS SULFATE 325(65) MG
325 TABLET ORAL 2 TIMES DAILY
COMMUNITY

## 2017-11-22 RX ADMIN — DORZOLAMIDE HYDROCHLORIDE 1 DROP: 20 SOLUTION/ DROPS OPHTHALMIC at 20:34

## 2017-11-22 RX ADMIN — ASPIRIN 81 MG 81 MG: 81 TABLET ORAL at 20:31

## 2017-11-22 RX ADMIN — DOCUSATE SODIUM 100 MG: 100 CAPSULE, LIQUID FILLED ORAL at 18:30

## 2017-11-22 RX ADMIN — LATANOPROST 1 DROP: 50 SOLUTION OPHTHALMIC at 20:34

## 2017-11-22 RX ADMIN — LEVETIRACETAM 500 MG: 500 TABLET, FILM COATED ORAL at 20:31

## 2017-11-22 RX ADMIN — MEMANTINE 5 MG: 10 TABLET ORAL at 20:31

## 2017-11-23 ENCOUNTER — APPOINTMENT (OUTPATIENT)
Dept: MRI IMAGING | Facility: HOSPITAL | Age: 82
End: 2017-11-23

## 2017-11-23 PROBLEM — R10.9 ABDOMINAL PAIN: Status: ACTIVE | Noted: 2017-11-23

## 2017-11-23 PROBLEM — K86.2 CYSTIC MASS OF PANCREAS: Status: ACTIVE | Noted: 2017-11-23

## 2017-11-23 PROBLEM — K85.90 PANCREATITIS: Status: ACTIVE | Noted: 2017-11-23

## 2017-11-23 LAB
ALBUMIN SERPL-MCNC: 3.7 G/DL (ref 3.2–4.8)
ALBUMIN/GLOB SERPL: 1.9 G/DL (ref 1.5–2.5)
ALP SERPL-CCNC: 94 U/L (ref 25–100)
ALT SERPL W P-5'-P-CCNC: 7 U/L (ref 7–40)
ANION GAP SERPL CALCULATED.3IONS-SCNC: 7 MMOL/L (ref 3–11)
AST SERPL-CCNC: 10 U/L (ref 0–33)
BACTERIA UR QL AUTO: NORMAL /HPF
BASOPHILS # BLD AUTO: 0.07 10*3/MM3 (ref 0–0.2)
BASOPHILS NFR BLD AUTO: 1.5 % (ref 0–1)
BILIRUB SERPL-MCNC: 0.2 MG/DL (ref 0.3–1.2)
BILIRUB UR QL STRIP: NEGATIVE
BUN BLD-MCNC: 16 MG/DL (ref 9–23)
BUN/CREAT SERPL: 17.8 (ref 7–25)
CALCIUM SPEC-SCNC: 8.6 MG/DL (ref 8.7–10.4)
CHLORIDE SERPL-SCNC: 105 MMOL/L (ref 99–109)
CLARITY UR: ABNORMAL
CO2 SERPL-SCNC: 27 MMOL/L (ref 20–31)
COLOR UR: YELLOW
CREAT BLD-MCNC: 0.9 MG/DL (ref 0.6–1.3)
DEPRECATED RDW RBC AUTO: 40.9 FL (ref 37–54)
EOSINOPHIL # BLD AUTO: 0.26 10*3/MM3 (ref 0–0.3)
EOSINOPHIL NFR BLD AUTO: 5.5 % (ref 0–3)
ERYTHROCYTE [DISTWIDTH] IN BLOOD BY AUTOMATED COUNT: 12.7 % (ref 11.3–14.5)
GFR SERPL CREATININE-BSD FRML MDRD: 79 ML/MIN/1.73
GLOBULIN UR ELPH-MCNC: 1.9 GM/DL
GLUCOSE BLD-MCNC: 91 MG/DL (ref 70–100)
GLUCOSE UR STRIP-MCNC: NEGATIVE MG/DL
HCT VFR BLD AUTO: 36.4 % (ref 38.9–50.9)
HGB BLD-MCNC: 11.6 G/DL (ref 13.1–17.5)
HGB UR QL STRIP.AUTO: NEGATIVE
HYALINE CASTS UR QL AUTO: NORMAL /LPF
IMM GRANULOCYTES # BLD: 0.02 10*3/MM3 (ref 0–0.03)
IMM GRANULOCYTES NFR BLD: 0.4 % (ref 0–0.6)
KETONES UR QL STRIP: NEGATIVE
LEUKOCYTE ESTERASE UR QL STRIP.AUTO: NEGATIVE
LIPASE SERPL-CCNC: 214 U/L (ref 6–51)
LYMPHOCYTES # BLD AUTO: 0.97 10*3/MM3 (ref 0.6–4.8)
LYMPHOCYTES NFR BLD AUTO: 20.6 % (ref 24–44)
MCH RBC QN AUTO: 27.8 PG (ref 27–31)
MCHC RBC AUTO-ENTMCNC: 31.9 G/DL (ref 32–36)
MCV RBC AUTO: 87.1 FL (ref 80–99)
MONOCYTES # BLD AUTO: 0.45 10*3/MM3 (ref 0–1)
MONOCYTES NFR BLD AUTO: 9.6 % (ref 0–12)
NEUTROPHILS # BLD AUTO: 2.94 10*3/MM3 (ref 1.5–8.3)
NEUTROPHILS NFR BLD AUTO: 62.4 % (ref 41–71)
NITRITE UR QL STRIP: NEGATIVE
PH UR STRIP.AUTO: 7 [PH] (ref 5–8)
PLATELET # BLD AUTO: 286 10*3/MM3 (ref 150–450)
PMV BLD AUTO: 9.1 FL (ref 6–12)
POTASSIUM BLD-SCNC: 4.2 MMOL/L (ref 3.5–5.5)
PROT SERPL-MCNC: 5.6 G/DL (ref 5.7–8.2)
PROT UR QL STRIP: NEGATIVE
RBC # BLD AUTO: 4.18 10*6/MM3 (ref 4.2–5.76)
RBC # UR: NORMAL /HPF
REF LAB TEST METHOD: NORMAL
SODIUM BLD-SCNC: 139 MMOL/L (ref 132–146)
SP GR UR STRIP: 1.02 (ref 1–1.03)
SQUAMOUS #/AREA URNS HPF: NORMAL /HPF
UROBILINOGEN UR QL STRIP: ABNORMAL
WBC NRBC COR # BLD: 4.71 10*3/MM3 (ref 3.5–10.8)
WBC UR QL AUTO: NORMAL /HPF

## 2017-11-23 PROCEDURE — 85025 COMPLETE CBC W/AUTO DIFF WBC: CPT | Performed by: NURSE PRACTITIONER

## 2017-11-23 PROCEDURE — A9577 INJ MULTIHANCE: HCPCS | Performed by: INTERNAL MEDICINE

## 2017-11-23 PROCEDURE — 97161 PT EVAL LOW COMPLEX 20 MIN: CPT

## 2017-11-23 PROCEDURE — 97165 OT EVAL LOW COMPLEX 30 MIN: CPT | Performed by: OCCUPATIONAL THERAPIST

## 2017-11-23 PROCEDURE — 99232 SBSQ HOSP IP/OBS MODERATE 35: CPT | Performed by: INTERNAL MEDICINE

## 2017-11-23 PROCEDURE — 83690 ASSAY OF LIPASE: CPT | Performed by: NURSE PRACTITIONER

## 2017-11-23 PROCEDURE — 74183 MRI ABD W/O CNTR FLWD CNTR: CPT

## 2017-11-23 PROCEDURE — 81001 URINALYSIS AUTO W/SCOPE: CPT | Performed by: NURSE PRACTITIONER

## 2017-11-23 PROCEDURE — 99222 1ST HOSP IP/OBS MODERATE 55: CPT | Performed by: INTERNAL MEDICINE

## 2017-11-23 PROCEDURE — 80053 COMPREHEN METABOLIC PANEL: CPT | Performed by: NURSE PRACTITIONER

## 2017-11-23 PROCEDURE — 0 GADOBENATE DIMEGLUMINE 529 MG/ML SOLUTION: Performed by: INTERNAL MEDICINE

## 2017-11-23 RX ADMIN — LATANOPROST 1 DROP: 50 SOLUTION OPHTHALMIC at 20:19

## 2017-11-23 RX ADMIN — Medication 325 MG: at 09:05

## 2017-11-23 RX ADMIN — LEVETIRACETAM 500 MG: 500 TABLET, FILM COATED ORAL at 20:20

## 2017-11-23 RX ADMIN — ASPIRIN 81 MG 81 MG: 81 TABLET ORAL at 20:19

## 2017-11-23 RX ADMIN — MEMANTINE 5 MG: 10 TABLET ORAL at 20:19

## 2017-11-23 RX ADMIN — TIMOLOL MALEATE 1 DROP: 2.5 SOLUTION/ DROPS OPHTHALMIC at 09:00

## 2017-11-23 RX ADMIN — DORZOLAMIDE HYDROCHLORIDE 1 DROP: 20 SOLUTION/ DROPS OPHTHALMIC at 16:00

## 2017-11-23 RX ADMIN — LEVETIRACETAM 500 MG: 500 TABLET, FILM COATED ORAL at 16:00

## 2017-11-23 RX ADMIN — DOCUSATE SODIUM 100 MG: 100 CAPSULE, LIQUID FILLED ORAL at 09:05

## 2017-11-23 RX ADMIN — DORZOLAMIDE HYDROCHLORIDE 1 DROP: 20 SOLUTION/ DROPS OPHTHALMIC at 20:19

## 2017-11-23 RX ADMIN — Medication 325 MG: at 17:55

## 2017-11-23 RX ADMIN — DOCUSATE SODIUM 100 MG: 100 CAPSULE, LIQUID FILLED ORAL at 17:55

## 2017-11-23 RX ADMIN — DORZOLAMIDE HYDROCHLORIDE 1 DROP: 20 SOLUTION/ DROPS OPHTHALMIC at 09:00

## 2017-11-23 RX ADMIN — LEVETIRACETAM 500 MG: 500 TABLET, FILM COATED ORAL at 09:05

## 2017-11-23 RX ADMIN — GADOBENATE DIMEGLUMINE 8 ML: 529 INJECTION, SOLUTION INTRAVENOUS at 14:00

## 2017-11-23 RX ADMIN — Medication 100 MG: at 09:05

## 2017-11-23 RX ADMIN — LISINOPRIL 20 MG: 20 TABLET ORAL at 09:05

## 2017-11-23 RX ADMIN — TIMOLOL MALEATE 1 DROP: 2.5 SOLUTION/ DROPS OPHTHALMIC at 16:00

## 2017-11-23 RX ADMIN — MEMANTINE 5 MG: 10 TABLET ORAL at 09:05

## 2017-11-24 ENCOUNTER — ANESTHESIA (OUTPATIENT)
Dept: GASTROENTEROLOGY | Facility: HOSPITAL | Age: 82
End: 2017-11-24

## 2017-11-24 ENCOUNTER — ANESTHESIA EVENT (OUTPATIENT)
Dept: GASTROENTEROLOGY | Facility: HOSPITAL | Age: 82
End: 2017-11-24

## 2017-11-24 PROBLEM — K22.2 ESOPHAGEAL STENOSIS: Status: ACTIVE | Noted: 2017-11-24

## 2017-11-24 PROBLEM — K86.89 PANCREATIC DUCT CALCULUS: Status: ACTIVE | Noted: 2017-11-24

## 2017-11-24 LAB
ALBUMIN SERPL-MCNC: 4 G/DL (ref 3.2–4.8)
ALBUMIN/GLOB SERPL: 1.8 G/DL (ref 1.5–2.5)
ALP SERPL-CCNC: 98 U/L (ref 25–100)
ALT SERPL W P-5'-P-CCNC: 7 U/L (ref 7–40)
ANION GAP SERPL CALCULATED.3IONS-SCNC: 9 MMOL/L (ref 3–11)
AST SERPL-CCNC: 11 U/L (ref 0–33)
BILIRUB SERPL-MCNC: 0.4 MG/DL (ref 0.3–1.2)
BUN BLD-MCNC: 12 MG/DL (ref 9–23)
BUN/CREAT SERPL: 15 (ref 7–25)
CALCIUM SPEC-SCNC: 8.9 MG/DL (ref 8.7–10.4)
CHLORIDE SERPL-SCNC: 98 MMOL/L (ref 99–109)
CO2 SERPL-SCNC: 28 MMOL/L (ref 20–31)
CREAT BLD-MCNC: 0.8 MG/DL (ref 0.6–1.3)
GFR SERPL CREATININE-BSD FRML MDRD: 91 ML/MIN/1.73
GLOBULIN UR ELPH-MCNC: 2.2 GM/DL
GLUCOSE BLD-MCNC: 85 MG/DL (ref 70–100)
LIPASE SERPL-CCNC: 341 U/L (ref 6–51)
POTASSIUM BLD-SCNC: 3.8 MMOL/L (ref 3.5–5.5)
PROT SERPL-MCNC: 6.2 G/DL (ref 5.7–8.2)
SODIUM BLD-SCNC: 135 MMOL/L (ref 132–146)

## 2017-11-24 PROCEDURE — 88312 SPECIAL STAINS GROUP 1: CPT | Performed by: INTERNAL MEDICINE

## 2017-11-24 PROCEDURE — C1769 GUIDE WIRE: HCPCS | Performed by: INTERNAL MEDICINE

## 2017-11-24 PROCEDURE — 25010000002 PROPOFOL 1000 MG/ML EMULSION: Performed by: NURSE ANESTHETIST, CERTIFIED REGISTERED

## 2017-11-24 PROCEDURE — 99233 SBSQ HOSP IP/OBS HIGH 50: CPT | Performed by: INTERNAL MEDICINE

## 2017-11-24 PROCEDURE — 80053 COMPREHEN METABOLIC PANEL: CPT | Performed by: INTERNAL MEDICINE

## 2017-11-24 PROCEDURE — 88305 TISSUE EXAM BY PATHOLOGIST: CPT | Performed by: INTERNAL MEDICINE

## 2017-11-24 PROCEDURE — 0DB48ZX EXCISION OF ESOPHAGOGASTRIC JUNCTION, VIA NATURAL OR ARTIFICIAL OPENING ENDOSCOPIC, DIAGNOSTIC: ICD-10-PCS | Performed by: INTERNAL MEDICINE

## 2017-11-24 PROCEDURE — 0D758ZZ DILATION OF ESOPHAGUS, VIA NATURAL OR ARTIFICIAL OPENING ENDOSCOPIC: ICD-10-PCS | Performed by: INTERNAL MEDICINE

## 2017-11-24 PROCEDURE — 83690 ASSAY OF LIPASE: CPT | Performed by: INTERNAL MEDICINE

## 2017-11-24 RX ORDER — SODIUM CHLORIDE, SODIUM LACTATE, POTASSIUM CHLORIDE, CALCIUM CHLORIDE 600; 310; 30; 20 MG/100ML; MG/100ML; MG/100ML; MG/100ML
9 INJECTION, SOLUTION INTRAVENOUS CONTINUOUS
Status: DISCONTINUED | OUTPATIENT
Start: 2017-11-24 | End: 2017-11-30 | Stop reason: HOSPADM

## 2017-11-24 RX ORDER — SODIUM CHLORIDE 0.9 % (FLUSH) 0.9 %
1-10 SYRINGE (ML) INJECTION AS NEEDED
Status: DISCONTINUED | OUTPATIENT
Start: 2017-11-24 | End: 2017-11-24 | Stop reason: HOSPADM

## 2017-11-24 RX ORDER — LIDOCAINE HYDROCHLORIDE 10 MG/ML
0.5 INJECTION, SOLUTION EPIDURAL; INFILTRATION; INTRACAUDAL; PERINEURAL ONCE AS NEEDED
Status: DISCONTINUED | OUTPATIENT
Start: 2017-11-24 | End: 2017-11-24 | Stop reason: HOSPADM

## 2017-11-24 RX ORDER — LIDOCAINE HYDROCHLORIDE 10 MG/ML
0.5 INJECTION, SOLUTION EPIDURAL; INFILTRATION; INTRACAUDAL; PERINEURAL ONCE AS NEEDED
Status: DISCONTINUED | OUTPATIENT
Start: 2017-11-24 | End: 2017-11-30 | Stop reason: HOSPADM

## 2017-11-24 RX ORDER — ONDANSETRON 4 MG/1
4 TABLET, FILM COATED ORAL EVERY 6 HOURS PRN
Start: 2017-11-24

## 2017-11-24 RX ORDER — FAMOTIDINE 20 MG/1
20 TABLET, FILM COATED ORAL ONCE
Status: CANCELLED | OUTPATIENT
Start: 2017-11-24 | End: 2017-11-24

## 2017-11-24 RX ORDER — FAMOTIDINE 10 MG/ML
20 INJECTION, SOLUTION INTRAVENOUS ONCE
Status: COMPLETED | OUTPATIENT
Start: 2017-11-24 | End: 2017-11-24

## 2017-11-24 RX ORDER — TRAMADOL HYDROCHLORIDE 50 MG/1
50 TABLET ORAL EVERY 6 HOURS PRN
Start: 2017-11-24 | End: 2017-12-02

## 2017-11-24 RX ORDER — HYDROMORPHONE HYDROCHLORIDE 1 MG/ML
0.5 INJECTION, SOLUTION INTRAMUSCULAR; INTRAVENOUS; SUBCUTANEOUS
Status: DISCONTINUED | OUTPATIENT
Start: 2017-11-24 | End: 2017-11-24 | Stop reason: HOSPADM

## 2017-11-24 RX ORDER — SODIUM CHLORIDE 0.9 % (FLUSH) 0.9 %
1-10 SYRINGE (ML) INJECTION AS NEEDED
Status: DISCONTINUED | OUTPATIENT
Start: 2017-11-24 | End: 2017-11-30 | Stop reason: HOSPADM

## 2017-11-24 RX ORDER — PANTOPRAZOLE SODIUM 40 MG/1
40 TABLET, DELAYED RELEASE ORAL
Status: DISCONTINUED | OUTPATIENT
Start: 2017-11-24 | End: 2017-11-30 | Stop reason: HOSPADM

## 2017-11-24 RX ORDER — PANTOPRAZOLE SODIUM 40 MG/1
40 TABLET, DELAYED RELEASE ORAL DAILY
Start: 2017-11-24

## 2017-11-24 RX ORDER — FAMOTIDINE 10 MG/ML
20 INJECTION, SOLUTION INTRAVENOUS ONCE
Status: DISCONTINUED | OUTPATIENT
Start: 2017-11-24 | End: 2017-11-30 | Stop reason: HOSPADM

## 2017-11-24 RX ORDER — FENTANYL CITRATE 50 UG/ML
50 INJECTION, SOLUTION INTRAMUSCULAR; INTRAVENOUS
Status: DISCONTINUED | OUTPATIENT
Start: 2017-11-24 | End: 2017-11-24 | Stop reason: HOSPADM

## 2017-11-24 RX ORDER — PSEUDOEPHEDRINE HCL 30 MG
100 TABLET ORAL 2 TIMES DAILY
Start: 2017-11-24

## 2017-11-24 RX ORDER — LIDOCAINE HYDROCHLORIDE 10 MG/ML
INJECTION, SOLUTION INFILTRATION; PERINEURAL AS NEEDED
Status: DISCONTINUED | OUTPATIENT
Start: 2017-11-24 | End: 2017-11-24 | Stop reason: SURG

## 2017-11-24 RX ORDER — FAMOTIDINE 20 MG/1
20 TABLET, FILM COATED ORAL ONCE
Status: DISCONTINUED | OUTPATIENT
Start: 2017-11-24 | End: 2017-11-24

## 2017-11-24 RX ADMIN — LISINOPRIL 20 MG: 20 TABLET ORAL at 09:48

## 2017-11-24 RX ADMIN — LIDOCAINE HYDROCHLORIDE 50 MG: 10 INJECTION, SOLUTION INFILTRATION; PERINEURAL at 14:56

## 2017-11-24 RX ADMIN — LATANOPROST 1 DROP: 50 SOLUTION OPHTHALMIC at 20:15

## 2017-11-24 RX ADMIN — LEVETIRACETAM 500 MG: 500 TABLET, FILM COATED ORAL at 17:30

## 2017-11-24 RX ADMIN — DOCUSATE SODIUM 100 MG: 100 CAPSULE, LIQUID FILLED ORAL at 18:35

## 2017-11-24 RX ADMIN — TIMOLOL MALEATE 1 DROP: 2.5 SOLUTION/ DROPS OPHTHALMIC at 09:51

## 2017-11-24 RX ADMIN — MEMANTINE 5 MG: 10 TABLET ORAL at 09:48

## 2017-11-24 RX ADMIN — Medication 325 MG: at 09:47

## 2017-11-24 RX ADMIN — DORZOLAMIDE HYDROCHLORIDE 1 DROP: 20 SOLUTION/ DROPS OPHTHALMIC at 09:51

## 2017-11-24 RX ADMIN — ASPIRIN 81 MG 81 MG: 81 TABLET ORAL at 20:15

## 2017-11-24 RX ADMIN — Medication 10 ML: at 09:48

## 2017-11-24 RX ADMIN — Medication 325 MG: at 18:35

## 2017-11-24 RX ADMIN — DORZOLAMIDE HYDROCHLORIDE 1 DROP: 20 SOLUTION/ DROPS OPHTHALMIC at 18:42

## 2017-11-24 RX ADMIN — LEVETIRACETAM 500 MG: 500 TABLET, FILM COATED ORAL at 09:47

## 2017-11-24 RX ADMIN — Medication 100 MG: at 09:48

## 2017-11-24 RX ADMIN — LEVETIRACETAM 500 MG: 500 TABLET, FILM COATED ORAL at 20:15

## 2017-11-24 RX ADMIN — PROPOFOL 150 MCG/KG/MIN: 10 INJECTION, EMULSION INTRAVENOUS at 14:55

## 2017-11-24 RX ADMIN — DORZOLAMIDE HYDROCHLORIDE 1 DROP: 20 SOLUTION/ DROPS OPHTHALMIC at 20:16

## 2017-11-24 RX ADMIN — DOCUSATE SODIUM 100 MG: 100 CAPSULE, LIQUID FILLED ORAL at 09:47

## 2017-11-24 RX ADMIN — MEMANTINE 5 MG: 10 TABLET ORAL at 20:15

## 2017-11-24 RX ADMIN — FAMOTIDINE 20 MG: 10 INJECTION, SOLUTION INTRAVENOUS at 14:40

## 2017-11-24 RX ADMIN — SODIUM CHLORIDE, POTASSIUM CHLORIDE, SODIUM LACTATE AND CALCIUM CHLORIDE 9 ML/HR: 600; 310; 30; 20 INJECTION, SOLUTION INTRAVENOUS at 14:40

## 2017-11-25 LAB
ALBUMIN SERPL-MCNC: 3.8 G/DL (ref 3.2–4.8)
ALBUMIN/GLOB SERPL: 1.7 G/DL (ref 1.5–2.5)
ALP SERPL-CCNC: 94 U/L (ref 25–100)
ALT SERPL W P-5'-P-CCNC: 6 U/L (ref 7–40)
ANION GAP SERPL CALCULATED.3IONS-SCNC: 10 MMOL/L (ref 3–11)
AST SERPL-CCNC: 10 U/L (ref 0–33)
BILIRUB SERPL-MCNC: 0.4 MG/DL (ref 0.3–1.2)
BUN BLD-MCNC: 14 MG/DL (ref 9–23)
BUN/CREAT SERPL: 15.6 (ref 7–25)
CALCIUM SPEC-SCNC: 8.6 MG/DL (ref 8.7–10.4)
CHLORIDE SERPL-SCNC: 98 MMOL/L (ref 99–109)
CO2 SERPL-SCNC: 26 MMOL/L (ref 20–31)
CREAT BLD-MCNC: 0.9 MG/DL (ref 0.6–1.3)
GFR SERPL CREATININE-BSD FRML MDRD: 79 ML/MIN/1.73
GLOBULIN UR ELPH-MCNC: 2.3 GM/DL
GLUCOSE BLD-MCNC: 92 MG/DL (ref 70–100)
LIPASE SERPL-CCNC: 179 U/L (ref 6–51)
POTASSIUM BLD-SCNC: 3.8 MMOL/L (ref 3.5–5.5)
PROT SERPL-MCNC: 6.1 G/DL (ref 5.7–8.2)
SODIUM BLD-SCNC: 134 MMOL/L (ref 132–146)

## 2017-11-25 PROCEDURE — 97110 THERAPEUTIC EXERCISES: CPT

## 2017-11-25 PROCEDURE — 83690 ASSAY OF LIPASE: CPT | Performed by: INTERNAL MEDICINE

## 2017-11-25 PROCEDURE — 80053 COMPREHEN METABOLIC PANEL: CPT | Performed by: INTERNAL MEDICINE

## 2017-11-25 PROCEDURE — 99232 SBSQ HOSP IP/OBS MODERATE 35: CPT | Performed by: NURSE PRACTITIONER

## 2017-11-25 RX ADMIN — LATANOPROST 1 DROP: 50 SOLUTION OPHTHALMIC at 20:06

## 2017-11-25 RX ADMIN — LEVETIRACETAM 500 MG: 500 TABLET, FILM COATED ORAL at 16:43

## 2017-11-25 RX ADMIN — DORZOLAMIDE HYDROCHLORIDE 1 DROP: 20 SOLUTION/ DROPS OPHTHALMIC at 20:06

## 2017-11-25 RX ADMIN — MEMANTINE 5 MG: 10 TABLET ORAL at 08:34

## 2017-11-25 RX ADMIN — PANTOPRAZOLE SODIUM 40 MG: 40 TABLET, DELAYED RELEASE ORAL at 05:29

## 2017-11-25 RX ADMIN — DOCUSATE SODIUM 100 MG: 100 CAPSULE, LIQUID FILLED ORAL at 08:34

## 2017-11-25 RX ADMIN — LISINOPRIL 20 MG: 20 TABLET ORAL at 08:34

## 2017-11-25 RX ADMIN — DOCUSATE SODIUM 100 MG: 100 CAPSULE, LIQUID FILLED ORAL at 17:07

## 2017-11-25 RX ADMIN — DORZOLAMIDE HYDROCHLORIDE 1 DROP: 20 SOLUTION/ DROPS OPHTHALMIC at 08:34

## 2017-11-25 RX ADMIN — ASPIRIN 81 MG 81 MG: 81 TABLET ORAL at 20:06

## 2017-11-25 RX ADMIN — LEVETIRACETAM 500 MG: 500 TABLET, FILM COATED ORAL at 08:34

## 2017-11-25 RX ADMIN — Medication 325 MG: at 17:07

## 2017-11-25 RX ADMIN — TIMOLOL MALEATE 1 DROP: 2.5 SOLUTION/ DROPS OPHTHALMIC at 08:34

## 2017-11-25 RX ADMIN — Medication 325 MG: at 08:34

## 2017-11-25 RX ADMIN — MEMANTINE 5 MG: 10 TABLET ORAL at 20:06

## 2017-11-25 RX ADMIN — LEVETIRACETAM 500 MG: 500 TABLET, FILM COATED ORAL at 20:06

## 2017-11-25 RX ADMIN — DORZOLAMIDE HYDROCHLORIDE 1 DROP: 20 SOLUTION/ DROPS OPHTHALMIC at 16:44

## 2017-11-25 RX ADMIN — Medication 100 MG: at 08:34

## 2017-11-26 PROCEDURE — 99232 SBSQ HOSP IP/OBS MODERATE 35: CPT | Performed by: INTERNAL MEDICINE

## 2017-11-26 RX ADMIN — DORZOLAMIDE HYDROCHLORIDE 1 DROP: 20 SOLUTION/ DROPS OPHTHALMIC at 08:51

## 2017-11-26 RX ADMIN — LEVETIRACETAM 500 MG: 500 TABLET, FILM COATED ORAL at 08:51

## 2017-11-26 RX ADMIN — TIMOLOL MALEATE 1 DROP: 2.5 SOLUTION/ DROPS OPHTHALMIC at 08:52

## 2017-11-26 RX ADMIN — DORZOLAMIDE HYDROCHLORIDE 1 DROP: 20 SOLUTION/ DROPS OPHTHALMIC at 20:49

## 2017-11-26 RX ADMIN — PANTOPRAZOLE SODIUM 40 MG: 40 TABLET, DELAYED RELEASE ORAL at 05:24

## 2017-11-26 RX ADMIN — LISINOPRIL 20 MG: 20 TABLET ORAL at 08:51

## 2017-11-26 RX ADMIN — LATANOPROST 1 DROP: 50 SOLUTION OPHTHALMIC at 20:49

## 2017-11-26 RX ADMIN — LEVETIRACETAM 500 MG: 500 TABLET, FILM COATED ORAL at 20:48

## 2017-11-26 RX ADMIN — DORZOLAMIDE HYDROCHLORIDE 1 DROP: 20 SOLUTION/ DROPS OPHTHALMIC at 17:27

## 2017-11-26 RX ADMIN — MEMANTINE 5 MG: 10 TABLET ORAL at 20:49

## 2017-11-26 RX ADMIN — LEVETIRACETAM 500 MG: 500 TABLET, FILM COATED ORAL at 17:26

## 2017-11-26 RX ADMIN — Medication 100 MG: at 08:51

## 2017-11-26 RX ADMIN — DOCUSATE SODIUM 100 MG: 100 CAPSULE, LIQUID FILLED ORAL at 08:51

## 2017-11-26 RX ADMIN — Medication 325 MG: at 08:51

## 2017-11-26 RX ADMIN — ASPIRIN 81 MG 81 MG: 81 TABLET ORAL at 20:49

## 2017-11-26 RX ADMIN — MEMANTINE 5 MG: 10 TABLET ORAL at 08:51

## 2017-11-26 RX ADMIN — DOCUSATE SODIUM 100 MG: 100 CAPSULE, LIQUID FILLED ORAL at 17:26

## 2017-11-26 RX ADMIN — Medication 325 MG: at 17:26

## 2017-11-27 LAB
CYTO UR: NORMAL
LAB AP CASE REPORT: NORMAL
LAB AP CLINICAL INFORMATION: NORMAL
Lab: NORMAL
PATH REPORT.FINAL DX SPEC: NORMAL
PATH REPORT.GROSS SPEC: NORMAL

## 2017-11-27 PROCEDURE — 99232 SBSQ HOSP IP/OBS MODERATE 35: CPT | Performed by: NURSE PRACTITIONER

## 2017-11-27 PROCEDURE — 97116 GAIT TRAINING THERAPY: CPT

## 2017-11-27 RX ADMIN — LEVETIRACETAM 500 MG: 500 TABLET, FILM COATED ORAL at 09:46

## 2017-11-27 RX ADMIN — DORZOLAMIDE HYDROCHLORIDE 1 DROP: 20 SOLUTION/ DROPS OPHTHALMIC at 09:42

## 2017-11-27 RX ADMIN — Medication 100 MG: at 09:41

## 2017-11-27 RX ADMIN — Medication 325 MG: at 09:41

## 2017-11-27 RX ADMIN — DORZOLAMIDE HYDROCHLORIDE 1 DROP: 20 SOLUTION/ DROPS OPHTHALMIC at 20:06

## 2017-11-27 RX ADMIN — MEMANTINE 5 MG: 10 TABLET ORAL at 20:06

## 2017-11-27 RX ADMIN — PANTOPRAZOLE SODIUM 40 MG: 40 TABLET, DELAYED RELEASE ORAL at 05:46

## 2017-11-27 RX ADMIN — LEVETIRACETAM 500 MG: 500 TABLET, FILM COATED ORAL at 20:05

## 2017-11-27 RX ADMIN — TIMOLOL MALEATE 1 DROP: 2.5 SOLUTION/ DROPS OPHTHALMIC at 09:42

## 2017-11-27 RX ADMIN — DOCUSATE SODIUM 100 MG: 100 CAPSULE, LIQUID FILLED ORAL at 17:30

## 2017-11-27 RX ADMIN — MEMANTINE 5 MG: 10 TABLET ORAL at 09:41

## 2017-11-27 RX ADMIN — DORZOLAMIDE HYDROCHLORIDE 1 DROP: 20 SOLUTION/ DROPS OPHTHALMIC at 16:00

## 2017-11-27 RX ADMIN — Medication 325 MG: at 17:30

## 2017-11-27 RX ADMIN — LISINOPRIL 20 MG: 20 TABLET ORAL at 09:41

## 2017-11-27 RX ADMIN — DOCUSATE SODIUM 100 MG: 100 CAPSULE, LIQUID FILLED ORAL at 09:41

## 2017-11-27 RX ADMIN — LATANOPROST 1 DROP: 50 SOLUTION OPHTHALMIC at 20:06

## 2017-11-27 RX ADMIN — LEVETIRACETAM 500 MG: 500 TABLET, FILM COATED ORAL at 17:30

## 2017-11-27 RX ADMIN — ASPIRIN 81 MG 81 MG: 81 TABLET ORAL at 20:06

## 2017-11-28 PROCEDURE — 99232 SBSQ HOSP IP/OBS MODERATE 35: CPT | Performed by: NURSE PRACTITIONER

## 2017-11-28 RX ORDER — SENNA AND DOCUSATE SODIUM 50; 8.6 MG/1; MG/1
2 TABLET, FILM COATED ORAL 2 TIMES DAILY PRN
Status: DISCONTINUED | OUTPATIENT
Start: 2017-11-28 | End: 2017-11-30 | Stop reason: HOSPADM

## 2017-11-28 RX ADMIN — DORZOLAMIDE HYDROCHLORIDE 1 DROP: 20 SOLUTION/ DROPS OPHTHALMIC at 09:01

## 2017-11-28 RX ADMIN — Medication 325 MG: at 08:58

## 2017-11-28 RX ADMIN — MEMANTINE 5 MG: 10 TABLET ORAL at 08:58

## 2017-11-28 RX ADMIN — Medication 325 MG: at 17:34

## 2017-11-28 RX ADMIN — LISINOPRIL 20 MG: 20 TABLET ORAL at 08:58

## 2017-11-28 RX ADMIN — DORZOLAMIDE HYDROCHLORIDE 1 DROP: 20 SOLUTION/ DROPS OPHTHALMIC at 17:35

## 2017-11-28 RX ADMIN — LEVETIRACETAM 500 MG: 500 TABLET, FILM COATED ORAL at 08:59

## 2017-11-28 RX ADMIN — Medication 100 MG: at 08:58

## 2017-11-28 RX ADMIN — LEVETIRACETAM 500 MG: 500 TABLET, FILM COATED ORAL at 20:06

## 2017-11-28 RX ADMIN — POLYETHYLENE GLYCOL 3350 17 G: 17 POWDER, FOR SOLUTION ORAL at 11:45

## 2017-11-28 RX ADMIN — LATANOPROST 1 DROP: 50 SOLUTION OPHTHALMIC at 20:07

## 2017-11-28 RX ADMIN — DORZOLAMIDE HYDROCHLORIDE 1 DROP: 20 SOLUTION/ DROPS OPHTHALMIC at 20:15

## 2017-11-28 RX ADMIN — DOCUSATE SODIUM 100 MG: 100 CAPSULE, LIQUID FILLED ORAL at 08:58

## 2017-11-28 RX ADMIN — LEVETIRACETAM 500 MG: 500 TABLET, FILM COATED ORAL at 17:35

## 2017-11-28 RX ADMIN — ASPIRIN 81 MG 81 MG: 81 TABLET ORAL at 20:06

## 2017-11-28 RX ADMIN — MEMANTINE 5 MG: 10 TABLET ORAL at 20:06

## 2017-11-28 RX ADMIN — PANTOPRAZOLE SODIUM 40 MG: 40 TABLET, DELAYED RELEASE ORAL at 05:37

## 2017-11-28 RX ADMIN — Medication 10 ML: at 20:14

## 2017-11-28 RX ADMIN — DOCUSATE SODIUM 100 MG: 100 CAPSULE, LIQUID FILLED ORAL at 17:36

## 2017-11-28 RX ADMIN — TIMOLOL MALEATE 1 DROP: 2.5 SOLUTION/ DROPS OPHTHALMIC at 09:01

## 2017-11-29 VITALS
RESPIRATION RATE: 18 BRPM | HEIGHT: 67 IN | OXYGEN SATURATION: 96 % | WEIGHT: 98.9 LBS | SYSTOLIC BLOOD PRESSURE: 123 MMHG | BODY MASS INDEX: 15.52 KG/M2 | TEMPERATURE: 98.1 F | DIASTOLIC BLOOD PRESSURE: 58 MMHG | HEART RATE: 55 BPM

## 2017-11-29 LAB
ALBUMIN SERPL-MCNC: 4.1 G/DL (ref 3.2–4.8)
ALBUMIN/GLOB SERPL: 1.6 G/DL (ref 1.5–2.5)
ALP SERPL-CCNC: 89 U/L (ref 25–100)
ALT SERPL W P-5'-P-CCNC: 8 U/L (ref 7–40)
ANION GAP SERPL CALCULATED.3IONS-SCNC: 9 MMOL/L (ref 3–11)
AST SERPL-CCNC: 11 U/L (ref 0–33)
BILIRUB SERPL-MCNC: 0.3 MG/DL (ref 0.3–1.2)
BUN BLD-MCNC: 18 MG/DL (ref 9–23)
BUN/CREAT SERPL: 20 (ref 7–25)
CALCIUM SPEC-SCNC: 9.2 MG/DL (ref 8.7–10.4)
CHLORIDE SERPL-SCNC: 98 MMOL/L (ref 99–109)
CO2 SERPL-SCNC: 28 MMOL/L (ref 20–31)
CREAT BLD-MCNC: 0.9 MG/DL (ref 0.6–1.3)
GFR SERPL CREATININE-BSD FRML MDRD: 79 ML/MIN/1.73
GLOBULIN UR ELPH-MCNC: 2.6 GM/DL
GLUCOSE BLD-MCNC: 99 MG/DL (ref 70–100)
POTASSIUM BLD-SCNC: 4.3 MMOL/L (ref 3.5–5.5)
PROT SERPL-MCNC: 6.7 G/DL (ref 5.7–8.2)
SODIUM BLD-SCNC: 135 MMOL/L (ref 132–146)

## 2017-11-29 PROCEDURE — 97116 GAIT TRAINING THERAPY: CPT

## 2017-11-29 PROCEDURE — 97110 THERAPEUTIC EXERCISES: CPT

## 2017-11-29 PROCEDURE — 99231 SBSQ HOSP IP/OBS SF/LOW 25: CPT | Performed by: NURSE PRACTITIONER

## 2017-11-29 PROCEDURE — 97530 THERAPEUTIC ACTIVITIES: CPT

## 2017-11-29 PROCEDURE — 80053 COMPREHEN METABOLIC PANEL: CPT | Performed by: NURSE PRACTITIONER

## 2017-11-29 RX ADMIN — MEMANTINE 5 MG: 10 TABLET ORAL at 21:20

## 2017-11-29 RX ADMIN — TIMOLOL MALEATE 1 DROP: 2.5 SOLUTION/ DROPS OPHTHALMIC at 08:37

## 2017-11-29 RX ADMIN — LISINOPRIL 20 MG: 20 TABLET ORAL at 08:36

## 2017-11-29 RX ADMIN — DORZOLAMIDE HYDROCHLORIDE 1 DROP: 20 SOLUTION/ DROPS OPHTHALMIC at 21:20

## 2017-11-29 RX ADMIN — DORZOLAMIDE HYDROCHLORIDE 1 DROP: 20 SOLUTION/ DROPS OPHTHALMIC at 17:17

## 2017-11-29 RX ADMIN — LEVETIRACETAM 500 MG: 500 TABLET, FILM COATED ORAL at 17:16

## 2017-11-29 RX ADMIN — Medication 100 MG: at 08:36

## 2017-11-29 RX ADMIN — DOCUSATE SODIUM 100 MG: 100 CAPSULE, LIQUID FILLED ORAL at 17:16

## 2017-11-29 RX ADMIN — Medication 325 MG: at 17:16

## 2017-11-29 RX ADMIN — Medication 325 MG: at 08:37

## 2017-11-29 RX ADMIN — DORZOLAMIDE HYDROCHLORIDE 1 DROP: 20 SOLUTION/ DROPS OPHTHALMIC at 08:37

## 2017-11-29 RX ADMIN — LEVETIRACETAM 500 MG: 500 TABLET, FILM COATED ORAL at 21:20

## 2017-11-29 RX ADMIN — LATANOPROST 1 DROP: 50 SOLUTION OPHTHALMIC at 21:31

## 2017-11-29 RX ADMIN — PANTOPRAZOLE SODIUM 40 MG: 40 TABLET, DELAYED RELEASE ORAL at 06:12

## 2017-11-29 RX ADMIN — LEVETIRACETAM 500 MG: 500 TABLET, FILM COATED ORAL at 08:36

## 2017-11-29 RX ADMIN — POLYETHYLENE GLYCOL 3350 17 G: 17 POWDER, FOR SOLUTION ORAL at 08:37

## 2017-11-29 RX ADMIN — MEMANTINE 5 MG: 10 TABLET ORAL at 08:37

## 2017-11-29 RX ADMIN — ASPIRIN 81 MG 81 MG: 81 TABLET ORAL at 21:20

## 2017-11-29 RX ADMIN — DOCUSATE SODIUM 100 MG: 100 CAPSULE, LIQUID FILLED ORAL at 08:36

## 2017-12-12 ENCOUNTER — TELEPHONE (OUTPATIENT)
Dept: CARDIOLOGY | Facility: CLINIC | Age: 82
End: 2017-12-12

## 2017-12-12 NOTE — TELEPHONE ENCOUNTER
Peggy called and stated her spouse was having an ERCP by  at [facility] on 12/28/17 & needs cardiac clearance. She also needs to know how long to hold Eliquis. Please advise.

## 2017-12-13 ENCOUNTER — TELEPHONE (OUTPATIENT)
Dept: CARDIOLOGY | Facility: CLINIC | Age: 82
End: 2017-12-13

## 2018-04-04 ENCOUNTER — TELEPHONE (OUTPATIENT)
Dept: CARDIOLOGY | Facility: CLINIC | Age: 83
End: 2018-04-04

## 2018-04-04 RX ORDER — APIXABAN 2.5 MG/1
TABLET, FILM COATED ORAL
Qty: 60 TABLET | Refills: 6 | Status: SHIPPED | OUTPATIENT
Start: 2018-04-04 | End: 2018-10-31 | Stop reason: SDUPTHER

## 2018-04-04 NOTE — TELEPHONE ENCOUNTER
There was on request for a refill on eliquis on Mr Lyons but there is an order to stop it when he left the hospital but his wife thinks that was until his procedures were done and she has him back on it.   Does he need to be on it.  It is in his 11/24/18 transfer note to .

## 2018-06-01 DIAGNOSIS — F02.80 LATE ONSET ALZHEIMER'S DISEASE WITHOUT BEHAVIORAL DISTURBANCE (HCC): ICD-10-CM

## 2018-06-01 DIAGNOSIS — G40.909 SEIZURE DISORDER (HCC): ICD-10-CM

## 2018-06-01 DIAGNOSIS — G30.1 LATE ONSET ALZHEIMER'S DISEASE WITHOUT BEHAVIORAL DISTURBANCE (HCC): ICD-10-CM

## 2018-06-01 RX ORDER — LEVETIRACETAM 500 MG/1
TABLET ORAL
Qty: 270 TABLET | Refills: 1 | Status: SHIPPED | OUTPATIENT
Start: 2018-06-01 | End: 2018-09-20 | Stop reason: SDUPTHER

## 2018-06-01 RX ORDER — MEMANTINE HYDROCHLORIDE 5 MG/1
TABLET ORAL
Qty: 180 TABLET | Refills: 1 | Status: SHIPPED | OUTPATIENT
Start: 2018-06-01 | End: 2018-09-20 | Stop reason: SDUPTHER

## 2018-09-20 ENCOUNTER — OFFICE VISIT (OUTPATIENT)
Dept: NEUROLOGY | Facility: CLINIC | Age: 83
End: 2018-09-20

## 2018-09-20 VITALS
BODY MASS INDEX: 15.66 KG/M2 | OXYGEN SATURATION: 99 % | WEIGHT: 100 LBS | HEART RATE: 68 BPM | SYSTOLIC BLOOD PRESSURE: 122 MMHG | DIASTOLIC BLOOD PRESSURE: 58 MMHG

## 2018-09-20 DIAGNOSIS — E43 SEVERE MALNUTRITION (HCC): Primary | ICD-10-CM

## 2018-09-20 DIAGNOSIS — F02.80 LATE ONSET ALZHEIMER'S DISEASE WITHOUT BEHAVIORAL DISTURBANCE (HCC): ICD-10-CM

## 2018-09-20 DIAGNOSIS — G40.909 SEIZURE DISORDER (HCC): ICD-10-CM

## 2018-09-20 DIAGNOSIS — G30.1 LATE ONSET ALZHEIMER'S DISEASE WITHOUT BEHAVIORAL DISTURBANCE (HCC): ICD-10-CM

## 2018-09-20 PROCEDURE — 99214 OFFICE O/P EST MOD 30 MIN: CPT | Performed by: PSYCHIATRY & NEUROLOGY

## 2018-09-20 RX ORDER — LEVETIRACETAM 500 MG/1
500 TABLET ORAL 3 TIMES DAILY
Qty: 270 TABLET | Refills: 3 | Status: SHIPPED | OUTPATIENT
Start: 2018-09-20 | End: 2019-07-26

## 2018-09-20 RX ORDER — MEMANTINE HYDROCHLORIDE 10 MG/1
10 TABLET ORAL 2 TIMES DAILY
Qty: 180 TABLET | Refills: 3 | Status: SHIPPED | OUTPATIENT
Start: 2018-09-20 | End: 2019-09-27 | Stop reason: SDUPTHER

## 2018-09-20 NOTE — PROGRESS NOTES
Subjective:     Patient ID: Paul Lyons is a 89 y.o. male.    CC:   Chief Complaint   Patient presents with   • Alzheimer's       HPI:   History of Present Illness  The following portions of the patient's history were reviewed and updated as appropriate: allergies, current medications, past family history, past medical history, past social history, past surgical history and problem list.     Patient has not had any seizures, memory so so, wife would like to increase Namenda dose.    Past Medical History:   Diagnosis Date   • Abnormal CT scan, lung     PULMONARY NODULE (<6CM)   • Alzheimer's dementia    • Atrial fibrillation (CMS/HCC)    • BPH (benign prostatic hypertrophy)    • Bradycardia 4/26/2017   • Cataract     STATUS POST REMOVAL WITH LENS IMPLANT   • Coronary artery disease    • Dysphagia, on level 3 dysphagia comfort diet    • Esophageal stricture     WTIH NOTED DILATION   • GERD (gastroesophageal reflux disease)    • Glaucoma    • Hiatal hernia    • Hiatal hernia with GERD 4/26/2017   • Hypercholesteremia    • Hypertension    • Malignant neoplasm (CMS/HCC)    • Obstructive sleep apnea on CPAP    • LEE on CPAP 4/26/2017   • Paroxysmal atrial fibrillation (CMS/HCC)    • Peripheral vascular disease (CMS/HCC)    • Right upper lobe pneumonia (CMS/HCC)    • Seizure disorder (CMS/HCC)    • Skin cancer     WITH REMOVAL   • TIA (transient ischemic attack) 02/2013    Carotid duplex 50% right ICA.       Past Surgical History:   Procedure Laterality Date   • BACK SURGERY     • CATARACT EXTRACTION WITH INTRAOCULAR LENS IMPLANT     • CERVICAL LAMINECTOMY     • CORONARY ARTERY BYPASS GRAFT  04/15/2002    x4   • ENDOSCOPY N/A 11/24/2017    Procedure: ESOPHAGOGASTRODUODENOSCOPY;  Surgeon: Wil Sandoval MD;  Location: [facility] ENDOSCOPY;  Service:    • ESOPHAGEAL DILATATION     • HERNIA REPAIR     • INGUINAL HERNIA REPAIR Left 6/15/2017    Procedure: LEFT INGUINAL HERNIA REPAIR;  Surgeon: Riaz Grey MD;   Location: [facility] OR;  Service:    • LUMBAR LAMINECTOMY  02/2012   • SKIN CANCER EXCISION         Social History     Social History   • Marital status:      Spouse name: N/A   • Number of children: N/A   • Years of education: N/A     Occupational History   • Not on file.     Social History Main Topics   • Smoking status: Former Smoker   • Smokeless tobacco: Not on file      Comment: QUIT MANY YEARS AGO   • Alcohol use No   • Drug use: No   • Sexual activity: Defer     Other Topics Concern   • Not on file     Social History Narrative   • No narrative on file       Family History   Problem Relation Age of Onset   • Coronary artery disease Other    • Stroke Other         Review of Systems   Constitutional: Negative for chills, fatigue, fever and unexpected weight change.   HENT: Negative for ear pain, hearing loss, nosebleeds, rhinorrhea and sore throat.    Eyes: Positive for visual disturbance. Negative for photophobia, pain, discharge and itching.   Respiratory: Negative for cough, chest tightness, shortness of breath and wheezing.    Cardiovascular: Negative for chest pain, palpitations and leg swelling.   Gastrointestinal: Negative for abdominal pain, blood in stool, constipation, diarrhea, nausea and vomiting.   Genitourinary: Negative for dysuria, frequency, hematuria and urgency.   Musculoskeletal: Positive for gait problem. Negative for arthralgias, back pain, joint swelling, myalgias, neck pain and neck stiffness.   Skin: Negative for rash and wound.   Allergic/Immunologic: Negative for environmental allergies and food allergies.   Neurological: Positive for weakness. Negative for dizziness, tremors, seizures, syncope, speech difficulty, light-headedness, numbness and headaches.   Hematological: Negative for adenopathy. Bruises/bleeds easily.   Psychiatric/Behavioral: Positive for confusion. Negative for agitation, decreased concentration, hallucinations, sleep disturbance and suicidal ideas. The patient  is not nervous/anxious.    All other systems reviewed and are negative.       Objective:    Neurologic Exam    Physical Exam   Constitutional:   Looks frail, underweight.   Cardiovascular: Normal rate and regular rhythm.    Pulmonary/Chest: Effort normal.   Neurological: He is alert. He has normal reflexes.   Psychiatric: He has a normal mood and affect. His behavior is normal. Thought content normal.       Assessment/Plan:       Paul was seen today for alzheimer's.    Diagnoses and all orders for this visit:    Severe malnutrition (CMS/HCC)        -     Encouraged nutrition, hydration, protein shakes.    Late onset Alzheimer's disease without behavioral disturbance  -     memantine (NAMENDA) 10 MG tablet; Take 1 tablet by mouth 2 (Two) Times a Day.    Seizure disorder (CMS/HCC)  -     levETIRAcetam (KEPPRA) 500 MG tablet; Take 1 tablet by mouth 3 (Three) Times a Day.             Ender Garcia MD  9/20/2018

## 2019-05-01 RX ORDER — APIXABAN 2.5 MG/1
TABLET, FILM COATED ORAL
Qty: 60 TABLET | Refills: 2 | Status: SHIPPED | OUTPATIENT
Start: 2019-05-01 | End: 2019-07-25 | Stop reason: SDUPTHER

## 2019-07-25 ENCOUNTER — TELEPHONE (OUTPATIENT)
Dept: NEUROLOGY | Facility: CLINIC | Age: 84
End: 2019-07-25

## 2019-07-25 NOTE — TELEPHONE ENCOUNTER
Radha Pt:   Pt's wife called and stated that the patient in the last couple weeks would start talking like he is having a conversation and then all of a sudden you cannot understand what he is saying. She stated that he never goes in to the full blown seizures but they are small episodes that he hasnt had in some time.     Two fridays ago he did got unresponsive. She said that he was out cold. She thought he was dead. She called 911 and told them that she thought he was dead. She didn't have them come over however because they never find a cause.     She wants to know if the medication needs increased? Or something else. She is concerned for the patient.

## 2019-07-26 DIAGNOSIS — G40.909 SEIZURE DISORDER (HCC): ICD-10-CM

## 2019-07-26 RX ORDER — LEVETIRACETAM 250 MG/1
750 TABLET ORAL 3 TIMES DAILY
Qty: 270 TABLET | Refills: 3 | Status: SHIPPED | OUTPATIENT
Start: 2019-07-26

## 2019-09-27 DIAGNOSIS — F02.80 LATE ONSET ALZHEIMER'S DISEASE WITHOUT BEHAVIORAL DISTURBANCE (HCC): ICD-10-CM

## 2019-09-27 DIAGNOSIS — G30.1 LATE ONSET ALZHEIMER'S DISEASE WITHOUT BEHAVIORAL DISTURBANCE (HCC): ICD-10-CM

## 2019-09-27 RX ORDER — MEMANTINE HYDROCHLORIDE 10 MG/1
TABLET ORAL
Qty: 180 TABLET | Refills: 3 | Status: SHIPPED | OUTPATIENT
Start: 2019-09-27

## 2023-04-15 NOTE — OP NOTE
DATE OF PROCEDURE:  06/15/2017    PREOPERATIVE DIAGNOSIS: Left inguinal hernia.     POSTOPERATIVE DIAGNOSIS: Left femoral hernia.    PROCEDURE PERFORMED: Repair of a left femoral hernia.     SURGEON: Riaz Grey MD    ASSISTANT: None.     ANESTHESIA: General with TAP block.     FINDINGS: A small femoral hernia medial to the femoral vessels, intact left inguinal hernia repair with mesh.     ESTIMATED BLOOD LOSS: 20 mL     SPECIMENS: None.     DRAINS: None.     INDICATIONS: The patient is an 89-year-old male who presented to [facility] 5 days ago with small bowel obstruction and a left inguinal hernia. The hernia was reduced in the emergency department and the patient declined surgical repair. He was discharged home subsequently and developed recurrent hernia with small bowel obstruction. The hernia was reduced at a local hospital and he was transferred to [facility] for management. As he was on Eliquis, the surgery was delayed for 2 days. During that time, the hernia did not recur and he was tolerating a clear liquid diet. The procedure was discussed with the patient in detail including benefits and risks.     DESCRIPTION OF PROCEDURE: The patient was identified, brought to the operating room and placed on the operating room table in supine position. After the placement of sequential compression stockings, he was induced with general anesthesia. He received antibiotics prior to incision. A timeout procedure was performed. Anesthesia placed TAP blocks prior to surgery. The abdomen was prepped and draped in a standard fashion. An oblique incision was created between the anterosuperior iliac spine and the pubic tubercle. The subcutaneous tissue was divided with electrocautery. The superficial epigastric was divided between clamps and tied with 3-0 silk suture. The subcutaneous tissue was freed from the external oblique aponeurosis. There was some evidence of scar tissue at this point  from his prior repair. The external oblique aponeurosis was opened and divided medially towards the pubic tubercle. There was significant scar tissue within the inguinal canal. The cord structures were identified and  from the surrounding structures at the level of the pubic tubercle. The cord structures were encircled with a Penrose drain and lifted anteriorly. The mesh then came into view and was very well secured to the pubic tubercle medially, inferior edge of the inguinal ligament inferiorly and the iliopubic tract and lateral structures superiorly. There was no evidence of direct or indirect inguinal hernia. I then suspected that a femoral hernia may be present. The cord structures were  pulled slightly through the internal ring and leaf of peritoneum was identified. This was opened and a finger was inserted into the peritoneal cavity for manual palpation. After completing this, a femoral hernia was identified. The neck of the hernia was relatively small. In order to repair the femoral hernia, the previously placed mesh was divided with scissors overlying the direction that the hernia defect was traveling. The hernia was traveling beneath the inguinal ligament somewhat medial to the femoral vessels. The inguinal ligament was also divided overlying the hernia sac. The hernia sac was then freed from the surrounding structures and was then connected with the previous incision that was placed in the peritoneum. The hernia sac was then freed from surrounding structures and divided. The defect in the peritoneum was then closed with a 3-0 Vicryl suture. The repair of the hernia was performed next. I initially thought that the shelving edge of the inguinal canal could be stretched to reach to cover the defect, but there was decreased laxity secondary to the patient's prior inguinal hernia repair. Therefore, a mesh was placed in the space to cover the femoral canal. This Prolene mesh was anchored to the soft  tissue overlying the pubic bone posteriorly, the pubic tubercle medially and the shelving edge of the inguinal ligament anteriorly. The mesh was also attached to the lateral structures surrounding the femoral vessels. The mesh was then fully secured. The wound was then irrigated. As the mesh was quite secure the decision was made to close the cut that was placed in the mesh as this provided a very adequate repair for his prior inguinal hernia. This was performed using 2-0 Vicryl sutures in interrupted and continuous fashion. Once the mesh was closed, a 1 cm opening was provided at the internal inguinal ring for the cord structures. The cavity was then irrigated a 2nd time. The external oblique aponeurosis was closed using 2-0 Vicryl suture in continuous fashion. Arnie's fascia was closed with a running 3-0 Vicryl suture. The skin was closed using subcuticular Monocryl. The skin was then cleaned and dried and the incision was dressed with Mastisol, Steri-Strips, Telfa and OpSite. The patient was awakened from anesthesia and transferred to the recovery room in stable condition. All needle, instrument, and sponge counts were correct at the end of the case.       Riaz Grey M.D.  Lukas  DD: 06/15/2017 14:35:14  DT: 06/15/2017 16:14:30  Voice Rec. ID #[id number]  Voice Original ID #[id number]  Doc ID #[id number]  Rev. #0  cc:       Patient tolerated procedure well.

## (undated) DEVICE — Device

## (undated) DEVICE — PK MINOR SPLT 10

## (undated) DEVICE — ST NERV BLCK CONT CONTIPLEX ECHO CLSD 18G 4IN

## (undated) DEVICE — THE BITE BLOCK MAXI, LATEX FREE STRAP IS USED TO PROTECT THE ENDOSCOPE INSERTION TUBE FROM BEING BITTEN BY THE PATIENT.

## (undated) DEVICE — SUT MNCRYL PLS ANTIB UD 4/0 PS2 18IN

## (undated) DEVICE — DRSNG SURESITE WNDW 4X4.5

## (undated) DEVICE — KITTNER SPONGE: Brand: DEROYAL

## (undated) DEVICE — MEDI-VAC YANKAUER SUCTION HANDLE W/BULBOUS TIP: Brand: CARDINAL HEALTH

## (undated) DEVICE — SINGLE-USE BIOPSY FORCEPS: Brand: RADIAL JAW 4

## (undated) DEVICE — CANNULA,ADULT,SOFT-TOUCH,7TUBE,SC: Brand: MEDLINE

## (undated) DEVICE — SUT VIC 2/0 SH 27IN

## (undated) DEVICE — DRN PENRS 1/2X18IN LTX

## (undated) DEVICE — SUT NUROLON 0 MO7 CR8 18IN C541D

## (undated) DEVICE — GLV SURG DERMASSURE GRN LF PF 7.0

## (undated) DEVICE — SUT SILK 2/0 TIES 18IN A185H

## (undated) DEVICE — CANNULA,OXY,ADULT,SUPERSOFT,W/7'TUB,UC: Brand: MEDLINE

## (undated) DEVICE — MEDI-VAC NON-CONDUCTIVE SUCTION TUBING: Brand: CARDINAL HEALTH

## (undated) DEVICE — 3M™ STERI-STRIP™ REINFORCED ADHESIVE SKIN CLOSURES, R1547, 1/2 IN X 4 IN (12 MM X 100 MM), 6 STRIPS/ENVELOPE: Brand: 3M™ STERI-STRIP™

## (undated) DEVICE — ANTIBACTERIAL UNDYED BRAIDED (POLYGLACTIN 910), SYNTHETIC ABSORBABLE SUTURE: Brand: COATED VICRYL

## (undated) DEVICE — GLV SURG SENSICARE MICRO PF LF 7 STRL

## (undated) DEVICE — DRSNG TELFA PAD NONADH STR 1S 3X8IN

## (undated) DEVICE — 3M™ IOBAN™ 2 ANTIMICROBIAL INCISE DRAPE 6650EZ: Brand: IOBAN™ 2

## (undated) DEVICE — SUT SILK 3/0 TIES 18IN A184H